# Patient Record
Sex: FEMALE | Race: WHITE | Employment: FULL TIME | URBAN - METROPOLITAN AREA
[De-identification: names, ages, dates, MRNs, and addresses within clinical notes are randomized per-mention and may not be internally consistent; named-entity substitution may affect disease eponyms.]

---

## 2018-01-26 ENCOUNTER — HOSPITAL ENCOUNTER (EMERGENCY)
Facility: HOSPITAL | Age: 48
Discharge: HOME | End: 2018-01-26
Payer: COMMERCIAL

## 2018-01-26 ENCOUNTER — APPOINTMENT (OUTPATIENT)
Dept: GENERAL RADIOLOGY | Facility: HOSPITAL | Age: 48
End: 2018-01-26
Payer: COMMERCIAL

## 2018-01-26 VITALS
SYSTOLIC BLOOD PRESSURE: 130 MMHG | HEIGHT: 65 IN | HEART RATE: 67 BPM | RESPIRATION RATE: 18 BRPM | DIASTOLIC BLOOD PRESSURE: 85 MMHG | BODY MASS INDEX: 34.99 KG/M2 | WEIGHT: 210 LBS | OXYGEN SATURATION: 97 % | TEMPERATURE: 98 F

## 2018-01-26 DIAGNOSIS — J06.9 UPPER RESPIRATORY TRACT INFECTION, UNSPECIFIED TYPE: Primary | ICD-10-CM

## 2018-01-26 DIAGNOSIS — R07.89 CHEST PAIN, ATYPICAL: ICD-10-CM

## 2018-01-26 LAB
ANION GAP SERPL CALC-SCNC: 10 MMOL/L (ref 0–18)
BASOPHILS # BLD: 0.1 K/UL (ref 0–0.2)
BASOPHILS NFR BLD: 1 %
BUN SERPL-MCNC: 11 MG/DL (ref 8–20)
BUN/CREAT SERPL: 13.4 (ref 10–20)
CALCIUM SERPL-MCNC: 9.1 MG/DL (ref 8.5–10.5)
CHLORIDE SERPL-SCNC: 102 MMOL/L (ref 95–110)
CO2 SERPL-SCNC: 23 MMOL/L (ref 22–32)
CREAT SERPL-MCNC: 0.82 MG/DL (ref 0.5–1.5)
EOSINOPHIL # BLD: 0.2 K/UL (ref 0–0.7)
EOSINOPHIL NFR BLD: 2 %
ERYTHROCYTE [DISTWIDTH] IN BLOOD BY AUTOMATED COUNT: 13 % (ref 11–15)
FLUAV + FLUBV RNA SPEC NAA+PROBE: NEGATIVE
GLUCOSE SERPL-MCNC: 102 MG/DL (ref 70–99)
HCT VFR BLD AUTO: 41.3 % (ref 35–48)
HGB BLD-MCNC: 13.8 G/DL (ref 12–16)
LYMPHOCYTES # BLD: 3.1 K/UL (ref 1–4)
LYMPHOCYTES NFR BLD: 33 %
MCH RBC QN AUTO: 28.7 PG (ref 27–32)
MCHC RBC AUTO-ENTMCNC: 33.3 G/DL (ref 32–37)
MCV RBC AUTO: 86.1 FL (ref 80–100)
MONOCYTES # BLD: 1.5 K/UL (ref 0–1)
MONOCYTES NFR BLD: 16 %
NEUTROPHILS # BLD AUTO: 4.5 K/UL (ref 1.8–7.7)
NEUTROPHILS NFR BLD: 48 %
OSMOLALITY UR CALC.SUM OF ELEC: 280 MOSM/KG (ref 275–295)
PLATELET # BLD AUTO: 297 K/UL (ref 140–400)
PMV BLD AUTO: 8 FL (ref 7.4–10.3)
POTASSIUM SERPL-SCNC: 3.8 MMOL/L (ref 3.3–5.1)
RBC # BLD AUTO: 4.79 M/UL (ref 3.7–5.4)
SODIUM SERPL-SCNC: 135 MMOL/L (ref 136–144)
TROPONIN I SERPL-MCNC: 0 NG/ML (ref ?–0.03)
WBC # BLD AUTO: 9.4 K/UL (ref 4–11)

## 2018-01-26 PROCEDURE — 85025 COMPLETE CBC W/AUTO DIFF WBC: CPT

## 2018-01-26 PROCEDURE — 99285 EMERGENCY DEPT VISIT HI MDM: CPT

## 2018-01-26 PROCEDURE — 71046 X-RAY EXAM CHEST 2 VIEWS: CPT

## 2018-01-26 PROCEDURE — 84484 ASSAY OF TROPONIN QUANT: CPT

## 2018-01-26 PROCEDURE — 87631 RESP VIRUS 3-5 TARGETS: CPT

## 2018-01-26 PROCEDURE — 93010 ELECTROCARDIOGRAM REPORT: CPT | Performed by: INTERNAL MEDICINE

## 2018-01-26 PROCEDURE — 80048 BASIC METABOLIC PNL TOTAL CA: CPT

## 2018-01-26 PROCEDURE — 36415 COLL VENOUS BLD VENIPUNCTURE: CPT

## 2018-01-26 PROCEDURE — 93005 ELECTROCARDIOGRAM TRACING: CPT

## 2018-01-26 PROCEDURE — 94640 AIRWAY INHALATION TREATMENT: CPT

## 2018-01-26 RX ORDER — ALBUTEROL SULFATE 90 UG/1
2 AEROSOL, METERED RESPIRATORY (INHALATION) EVERY 4 HOURS PRN
Qty: 1 INHALER | Refills: 0 | Status: SHIPPED | OUTPATIENT
Start: 2018-01-26 | End: 2018-02-25

## 2018-01-26 RX ORDER — AMOXICILLIN 500 MG/1
500 TABLET, FILM COATED ORAL 2 TIMES DAILY
COMMUNITY

## 2018-01-26 RX ORDER — IPRATROPIUM BROMIDE AND ALBUTEROL SULFATE 2.5; .5 MG/3ML; MG/3ML
3 SOLUTION RESPIRATORY (INHALATION) ONCE
Status: COMPLETED | OUTPATIENT
Start: 2018-01-26 | End: 2018-01-26

## 2018-01-27 NOTE — ED NOTES
Received pt from triage. Pt here with c/o SOB since yesterday and chills x1 week. Pt denies any fevers or pain. Lung sounds clear. Awaiting duoneb per resp.

## 2018-01-27 NOTE — ED PROVIDER NOTES
Patient Seen in: [facility] AND [facility] Emergency Department    History   Patient presents with:  Dyspnea TERRA SOB (respiratory)    Stated Complaint: Chest pressure and SOB    HPI    Patient presents with complaint of for the past week has not been feeling wel noted in HPI. Constitutional and vital signs reviewed. All other systems reviewed and negative except as noted above. PSFH elements reviewed from today and agreed except as otherwise stated in HPI.     Physical Exam   ED Triage Vitals  BP: 107/73 [ METABOLIC PANEL (8) - Abnormal; Notable for the following:        Result Value    Glucose 102 (*)     Sodium 135 (*)     All other components within normal limits   CBC W/ DIFFERENTIAL - Abnormal; Notable for the following:     Monocyte Absolute 1.5 (*) for Wheezing., Normal, Disp-1 Inhaler, R-0

## 2020-07-30 ENCOUNTER — HOSPITAL ENCOUNTER (OUTPATIENT)
Dept: MAMMOGRAPHY | Facility: HOSPITAL | Age: 50
Discharge: HOME | End: 2020-07-30
Attending: FAMILY MEDICINE
Payer: COMMERCIAL

## 2020-07-30 DIAGNOSIS — Z12.31 ENCOUNTER FOR SCREENING MAMMOGRAM FOR MALIGNANT NEOPLASM OF BREAST: ICD-10-CM

## 2020-08-22 ENCOUNTER — HOSPITAL ENCOUNTER (OUTPATIENT)
Dept: MAMMOGRAPHY | Facility: HOSPITAL | Age: 50
Discharge: HOME | End: 2020-08-22
Attending: FAMILY MEDICINE
Payer: COMMERCIAL

## 2020-08-22 PROCEDURE — 77063 BREAST TOMOSYNTHESIS BI: CPT | Performed by: FAMILY MEDICINE

## 2020-08-22 PROCEDURE — 77067 SCR MAMMO BI INCL CAD: CPT | Performed by: FAMILY MEDICINE

## 2022-05-07 ENCOUNTER — HOSPITAL ENCOUNTER (EMERGENCY)
Facility: HOSPITAL | Age: 52
Discharge: HOME | End: 2022-05-07
Attending: EMERGENCY MEDICINE
Payer: COMMERCIAL

## 2022-05-07 ENCOUNTER — APPOINTMENT (OUTPATIENT)
Dept: GENERAL RADIOLOGY | Facility: HOSPITAL | Age: 52
End: 2022-05-07
Attending: EMERGENCY MEDICINE
Payer: COMMERCIAL

## 2022-05-07 VITALS
HEART RATE: 93 BPM | TEMPERATURE: 97 F | SYSTOLIC BLOOD PRESSURE: 122 MMHG | WEIGHT: 175 LBS | DIASTOLIC BLOOD PRESSURE: 80 MMHG | OXYGEN SATURATION: 99 % | BODY MASS INDEX: 29.16 KG/M2 | RESPIRATION RATE: 18 BRPM | HEIGHT: 65 IN

## 2022-05-07 DIAGNOSIS — J02.0 STREP PHARYNGITIS: Primary | ICD-10-CM

## 2022-05-07 LAB
S PYO AG THROAT QL: POSITIVE
SARS-COV-2 RNA RESP QL NAA+PROBE: NOT DETECTED

## 2022-05-07 PROCEDURE — 99284 EMERGENCY DEPT VISIT MOD MDM: CPT

## 2022-05-07 PROCEDURE — 71045 X-RAY EXAM CHEST 1 VIEW: CPT | Performed by: EMERGENCY MEDICINE

## 2022-05-07 PROCEDURE — 87880 STREP A ASSAY W/OPTIC: CPT

## 2022-05-07 RX ORDER — PENICILLIN V POTASSIUM 500 MG/1
500 TABLET ORAL 3 TIMES DAILY
Qty: 30 TABLET | Refills: 0 | Status: SHIPPED | OUTPATIENT
Start: 2022-05-07 | End: 2022-05-17

## 2022-05-07 RX ORDER — IBUPROFEN 600 MG/1
600 TABLET ORAL ONCE
Status: COMPLETED | OUTPATIENT
Start: 2022-05-07 | End: 2022-05-07

## 2022-05-07 NOTE — ED INITIAL ASSESSMENT (HPI)
Pt to the ed for sore throat, cough, body aches, and fatigue since yesterday.   Also reports dizziness when standing

## 2022-05-31 ENCOUNTER — HOSPITAL ENCOUNTER (OUTPATIENT)
Dept: ULTRASOUND IMAGING | Age: 52
Discharge: HOME | End: 2022-05-31
Attending: FAMILY MEDICINE
Payer: COMMERCIAL

## 2022-05-31 ENCOUNTER — HOSPITAL ENCOUNTER (OUTPATIENT)
Dept: MAMMOGRAPHY | Facility: HOSPITAL | Age: 52
Discharge: HOME | End: 2022-05-31
Attending: FAMILY MEDICINE
Payer: COMMERCIAL

## 2022-05-31 DIAGNOSIS — Z12.31 ENCOUNTER FOR SCREENING MAMMOGRAM FOR MALIGNANT NEOPLASM OF BREAST: ICD-10-CM

## 2022-05-31 DIAGNOSIS — R10.2 PELVIC PAIN: ICD-10-CM

## 2022-05-31 DIAGNOSIS — R31.9 HEMATURIA: ICD-10-CM

## 2022-05-31 PROCEDURE — 76830 TRANSVAGINAL US NON-OB: CPT | Performed by: FAMILY MEDICINE

## 2022-05-31 PROCEDURE — 76856 US EXAM PELVIC COMPLETE: CPT | Performed by: FAMILY MEDICINE

## 2022-05-31 PROCEDURE — 76775 US EXAM ABDO BACK WALL LIM: CPT | Performed by: FAMILY MEDICINE

## 2022-06-13 PROBLEM — M76.822 POSTERIOR TIBIAL TENDON DYSFUNCTION, LEFT: Status: ACTIVE | Noted: 2022-06-13

## 2022-06-13 PROBLEM — M79.671 HEEL PAIN, BILATERAL: Status: ACTIVE | Noted: 2022-06-13

## 2022-06-13 PROBLEM — M79.672 HEEL PAIN, BILATERAL: Status: ACTIVE | Noted: 2022-06-13

## 2022-06-15 ENCOUNTER — HOSPITAL ENCOUNTER (OUTPATIENT)
Dept: ULTRASOUND IMAGING | Facility: HOSPITAL | Age: 52
Discharge: HOME | End: 2022-06-15
Attending: FAMILY MEDICINE
Payer: COMMERCIAL

## 2022-06-15 ENCOUNTER — HOSPITAL ENCOUNTER (OUTPATIENT)
Dept: MAMMOGRAPHY | Facility: HOSPITAL | Age: 52
Discharge: HOME | End: 2022-06-15
Attending: FAMILY MEDICINE
Payer: COMMERCIAL

## 2022-06-15 DIAGNOSIS — N64.4 MASTODYNIA: ICD-10-CM

## 2022-06-15 PROCEDURE — 76642 ULTRASOUND BREAST LIMITED: CPT | Performed by: FAMILY MEDICINE

## 2022-06-15 PROCEDURE — 77066 DX MAMMO INCL CAD BI: CPT | Performed by: FAMILY MEDICINE

## 2022-06-15 PROCEDURE — 77062 BREAST TOMOSYNTHESIS BI: CPT | Performed by: FAMILY MEDICINE

## 2023-02-23 ENCOUNTER — HOSPITAL ENCOUNTER (EMERGENCY)
Facility: HOSPITAL | Age: 53
Discharge: HOME | End: 2023-02-23
Attending: EMERGENCY MEDICINE

## 2023-02-23 ENCOUNTER — APPOINTMENT (OUTPATIENT)
Dept: CT IMAGING | Facility: HOSPITAL | Age: 53
End: 2023-02-23
Attending: EMERGENCY MEDICINE

## 2023-02-23 VITALS
BODY MASS INDEX: 32 KG/M2 | HEART RATE: 83 BPM | TEMPERATURE: 98 F | SYSTOLIC BLOOD PRESSURE: 108 MMHG | OXYGEN SATURATION: 94 % | RESPIRATION RATE: 16 BRPM | HEIGHT: 65 IN | DIASTOLIC BLOOD PRESSURE: 58 MMHG

## 2023-02-23 DIAGNOSIS — J02.0 STREP PHARYNGITIS: Primary | ICD-10-CM

## 2023-02-23 DIAGNOSIS — R10.9 ABDOMINAL PAIN, RIGHT LATERAL: ICD-10-CM

## 2023-02-23 LAB
ALBUMIN SERPL-MCNC: 3.5 G/DL (ref 3.4–5)
ALBUMIN/GLOB SERPL: 0.8 {RATIO} (ref 1–2)
ALP LIVER SERPL-CCNC: 69 U/L
ALT SERPL-CCNC: 62 U/L
ANION GAP SERPL CALC-SCNC: 6 MMOL/L (ref 0–18)
AST SERPL-CCNC: 38 U/L (ref 15–37)
BASOPHILS # BLD AUTO: 0.07 X10(3) UL (ref 0–0.2)
BASOPHILS NFR BLD AUTO: 0.5 %
BILIRUB SERPL-MCNC: 0.6 MG/DL (ref 0.1–2)
BILIRUB UR QL: NEGATIVE
BUN BLD-MCNC: 8 MG/DL (ref 7–18)
BUN/CREAT SERPL: 9.5 (ref 10–20)
CALCIUM BLD-MCNC: 9.1 MG/DL (ref 8.5–10.1)
CHLORIDE SERPL-SCNC: 107 MMOL/L (ref 98–112)
CLARITY UR: CLEAR
CO2 SERPL-SCNC: 23 MMOL/L (ref 21–32)
CREAT BLD-MCNC: 0.84 MG/DL
DEPRECATED RDW RBC AUTO: 39.8 FL (ref 35.1–46.3)
EOSINOPHIL # BLD AUTO: 0.06 X10(3) UL (ref 0–0.7)
EOSINOPHIL NFR BLD AUTO: 0.4 %
ERYTHROCYTE [DISTWIDTH] IN BLOOD BY AUTOMATED COUNT: 12.8 % (ref 11–15)
GFR SERPLBLD BASED ON 1.73 SQ M-ARVRAT: 84 ML/MIN/1.73M2 (ref 60–?)
GLOBULIN PLAS-MCNC: 4.5 G/DL (ref 2.8–4.4)
GLUCOSE BLD-MCNC: 133 MG/DL (ref 70–99)
GLUCOSE UR-MCNC: NORMAL MG/DL
HCT VFR BLD AUTO: 42.6 %
HGB BLD-MCNC: 14.1 G/DL
IMM GRANULOCYTES # BLD AUTO: 0.08 X10(3) UL (ref 0–1)
IMM GRANULOCYTES NFR BLD: 0.5 %
KETONES UR-MCNC: NEGATIVE MG/DL
LEUKOCYTE ESTERASE UR QL STRIP.AUTO: NEGATIVE
LIPASE SERPL-CCNC: 116 U/L (ref 73–393)
LIPASE SERPL-CCNC: 28 U/L (ref 13–75)
LYMPHOCYTES # BLD AUTO: 1.78 X10(3) UL (ref 1–4)
LYMPHOCYTES NFR BLD AUTO: 11.7 %
MCH RBC QN AUTO: 28.4 PG (ref 26–34)
MCHC RBC AUTO-ENTMCNC: 33.1 G/DL (ref 31–37)
MCV RBC AUTO: 85.7 FL
MONOCYTES # BLD AUTO: 1.47 X10(3) UL (ref 0.1–1)
MONOCYTES NFR BLD AUTO: 9.6 %
NEUTROPHILS # BLD AUTO: 11.81 X10 (3) UL (ref 1.5–7.7)
NEUTROPHILS # BLD AUTO: 11.81 X10(3) UL (ref 1.5–7.7)
NEUTROPHILS NFR BLD AUTO: 77.3 %
NITRITE UR QL STRIP.AUTO: NEGATIVE
OSMOLALITY SERPL CALC.SUM OF ELEC: 282 MOSM/KG (ref 275–295)
PH UR: 7.5 [PH] (ref 5–8)
PLATELET # BLD AUTO: 315 10(3)UL (ref 150–450)
POTASSIUM SERPL-SCNC: 3.8 MMOL/L (ref 3.5–5.1)
PROT SERPL-MCNC: 8 G/DL (ref 6.4–8.2)
RBC # BLD AUTO: 4.97 X10(6)UL
RBC #/AREA URNS AUTO: >10 /HPF
S PYO AG THROAT QL: POSITIVE
SODIUM SERPL-SCNC: 136 MMOL/L (ref 136–145)
SP GR UR STRIP: 1.02 (ref 1–1.03)
UROBILINOGEN UR STRIP-ACNC: NORMAL
WBC # BLD AUTO: 15.3 X10(3) UL (ref 4–11)

## 2023-02-23 PROCEDURE — 96361 HYDRATE IV INFUSION ADD-ON: CPT

## 2023-02-23 PROCEDURE — 96374 THER/PROPH/DIAG INJ IV PUSH: CPT

## 2023-02-23 PROCEDURE — 74176 CT ABD & PELVIS W/O CONTRAST: CPT | Performed by: EMERGENCY MEDICINE

## 2023-02-23 PROCEDURE — 81001 URINALYSIS AUTO W/SCOPE: CPT | Performed by: EMERGENCY MEDICINE

## 2023-02-23 PROCEDURE — 80053 COMPREHEN METABOLIC PANEL: CPT | Performed by: EMERGENCY MEDICINE

## 2023-02-23 PROCEDURE — 87880 STREP A ASSAY W/OPTIC: CPT

## 2023-02-23 PROCEDURE — 96375 TX/PRO/DX INJ NEW DRUG ADDON: CPT

## 2023-02-23 PROCEDURE — 99285 EMERGENCY DEPT VISIT HI MDM: CPT

## 2023-02-23 PROCEDURE — 85025 COMPLETE CBC W/AUTO DIFF WBC: CPT | Performed by: EMERGENCY MEDICINE

## 2023-02-23 PROCEDURE — 99284 EMERGENCY DEPT VISIT MOD MDM: CPT

## 2023-02-23 PROCEDURE — 83690 ASSAY OF LIPASE: CPT | Performed by: EMERGENCY MEDICINE

## 2023-02-23 RX ORDER — AMOXICILLIN 500 MG/1
1000 TABLET, FILM COATED ORAL DAILY
Qty: 20 TABLET | Refills: 0 | Status: SHIPPED | OUTPATIENT
Start: 2023-02-23 | End: 2023-03-05

## 2023-02-23 RX ORDER — ONDANSETRON 2 MG/ML
4 INJECTION INTRAMUSCULAR; INTRAVENOUS ONCE
Status: COMPLETED | OUTPATIENT
Start: 2023-02-23 | End: 2023-02-23

## 2023-02-23 RX ORDER — KETOROLAC TROMETHAMINE 15 MG/ML
15 INJECTION, SOLUTION INTRAMUSCULAR; INTRAVENOUS ONCE
Status: COMPLETED | OUTPATIENT
Start: 2023-02-23 | End: 2023-02-23

## 2023-02-23 RX ORDER — IBUPROFEN 600 MG/1
600 TABLET ORAL EVERY 8 HOURS PRN
Qty: 30 TABLET | Refills: 0 | Status: SHIPPED | OUTPATIENT
Start: 2023-02-23 | End: 2023-03-02

## 2023-02-23 NOTE — ED INITIAL ASSESSMENT (HPI)
Patient reports nausea/dizziness, right sided abdominal pain/RUQ pain and sore throat since last night. Patient reports feeling like she felt like she had a fever overnight.  Gallbladder removed 33 yrs ago

## 2023-07-21 ENCOUNTER — OFFICE VISIT (OUTPATIENT)
Dept: FAMILY MEDICINE CLINIC | Facility: CLINIC | Age: 53
End: 2023-07-21

## 2023-07-21 VITALS
HEART RATE: 73 BPM | HEIGHT: 65 IN | SYSTOLIC BLOOD PRESSURE: 124 MMHG | BODY MASS INDEX: 32.15 KG/M2 | DIASTOLIC BLOOD PRESSURE: 83 MMHG | WEIGHT: 193 LBS

## 2023-07-21 DIAGNOSIS — Z12.11 SCREENING FOR MALIGNANT NEOPLASM OF COLON: ICD-10-CM

## 2023-07-21 DIAGNOSIS — R35.0 URINARY FREQUENCY: ICD-10-CM

## 2023-07-21 DIAGNOSIS — R10.9 FLANK PAIN: ICD-10-CM

## 2023-07-21 DIAGNOSIS — N28.89 RIGHT KIDNEY MASS: Primary | ICD-10-CM

## 2023-07-21 LAB
APPEARANCE: CLEAR
BILIRUBIN: NEGATIVE
GLUCOSE (URINE DIPSTICK): NEGATIVE MG/DL
KETONES (URINE DIPSTICK): NEGATIVE MG/DL
LEUKOCYTES: NEGATIVE
MULTISTIX LOT#: ABNORMAL NUMERIC
NITRITE, URINE: NEGATIVE
PH, URINE: 6 (ref 4.5–8)
PROTEIN (URINE DIPSTICK): NEGATIVE MG/DL
SPECIFIC GRAVITY: 1.02 (ref 1–1.03)
URINE-COLOR: YELLOW
UROBILINOGEN,SEMI-QN: 0.2 MG/DL (ref 0–1.9)

## 2023-07-21 PROCEDURE — 81003 URINALYSIS AUTO W/O SCOPE: CPT | Performed by: NURSE PRACTITIONER

## 2023-07-21 PROCEDURE — 3079F DIAST BP 80-89 MM HG: CPT | Performed by: NURSE PRACTITIONER

## 2023-07-21 PROCEDURE — 3074F SYST BP LT 130 MM HG: CPT | Performed by: NURSE PRACTITIONER

## 2023-07-21 PROCEDURE — 3008F BODY MASS INDEX DOCD: CPT | Performed by: NURSE PRACTITIONER

## 2023-07-21 PROCEDURE — 99203 OFFICE O/P NEW LOW 30 MIN: CPT | Performed by: NURSE PRACTITIONER

## 2023-08-02 ENCOUNTER — OFFICE VISIT (OUTPATIENT)
Dept: FAMILY MEDICINE CLINIC | Facility: CLINIC | Age: 53
End: 2023-08-02

## 2023-08-02 VITALS
HEIGHT: 65 IN | SYSTOLIC BLOOD PRESSURE: 112 MMHG | HEART RATE: 73 BPM | DIASTOLIC BLOOD PRESSURE: 76 MMHG | BODY MASS INDEX: 32.65 KG/M2 | WEIGHT: 196 LBS

## 2023-08-02 DIAGNOSIS — R73.03 PREDIABETES: ICD-10-CM

## 2023-08-02 DIAGNOSIS — Z12.31 ENCOUNTER FOR SCREENING MAMMOGRAM FOR MALIGNANT NEOPLASM OF BREAST: ICD-10-CM

## 2023-08-02 DIAGNOSIS — N28.89 RIGHT KIDNEY MASS: Primary | ICD-10-CM

## 2023-08-02 DIAGNOSIS — E55.9 VITAMIN D DEFICIENCY: ICD-10-CM

## 2023-08-02 PROCEDURE — 3078F DIAST BP <80 MM HG: CPT | Performed by: NURSE PRACTITIONER

## 2023-08-02 PROCEDURE — 99214 OFFICE O/P EST MOD 30 MIN: CPT | Performed by: NURSE PRACTITIONER

## 2023-08-02 PROCEDURE — 3074F SYST BP LT 130 MM HG: CPT | Performed by: NURSE PRACTITIONER

## 2023-08-02 PROCEDURE — 3008F BODY MASS INDEX DOCD: CPT | Performed by: NURSE PRACTITIONER

## 2023-08-02 RX ORDER — TOPIRAMATE 25 MG/1
TABLET ORAL
Qty: 77 TABLET | Refills: 0 | Status: SHIPPED | OUTPATIENT
Start: 2023-08-02

## 2023-08-12 ENCOUNTER — APPOINTMENT (OUTPATIENT)
Dept: GENERAL RADIOLOGY | Age: 53
End: 2023-08-12
Attending: EMERGENCY MEDICINE

## 2023-08-12 ENCOUNTER — APPOINTMENT (OUTPATIENT)
Dept: CT IMAGING | Age: 53
End: 2023-08-12
Attending: EMERGENCY MEDICINE

## 2023-08-12 ENCOUNTER — HOSPITAL ENCOUNTER (OUTPATIENT)
Age: 53
Setting detail: OBSERVATION
Discharge: HOME | End: 2023-08-13
Attending: EMERGENCY MEDICINE | Admitting: INTERNAL MEDICINE

## 2023-08-12 DIAGNOSIS — G45.9 TIA (TRANSIENT ISCHEMIC ATTACK): Primary | ICD-10-CM

## 2023-08-12 LAB
ALBUMIN SERPL-MCNC: 3.6 G/DL (ref 3.6–5.1)
ALBUMIN/GLOB SERPL: 0.8 {RATIO} (ref 1–2.4)
ALP SERPL-CCNC: 74 UNITS/L (ref 45–117)
ALT SERPL-CCNC: 58 UNITS/L
ANION GAP SERPL CALC-SCNC: 11 MMOL/L (ref 7–19)
AST SERPL-CCNC: 36 UNITS/L
BASOPHILS # BLD: 0.1 K/MCL (ref 0–0.3)
BASOPHILS NFR BLD: 1 %
BILIRUB SERPL-MCNC: 0.4 MG/DL (ref 0.2–1)
BUN SERPL-MCNC: 14 MG/DL (ref 6–20)
BUN/CREAT SERPL: 17 (ref 7–25)
CALCIUM SERPL-MCNC: 8.9 MG/DL (ref 8.4–10.2)
CHLORIDE SERPL-SCNC: 109 MMOL/L (ref 97–110)
CO2 SERPL-SCNC: 24 MMOL/L (ref 21–32)
CREAT SERPL-MCNC: 0.82 MG/DL (ref 0.51–0.95)
DEPRECATED RDW RBC: 40.7 FL (ref 39–50)
EOSINOPHIL # BLD: 0.2 K/MCL (ref 0–0.5)
EOSINOPHIL NFR BLD: 2 %
ERYTHROCYTE [DISTWIDTH] IN BLOOD: 12.9 % (ref 11–15)
FASTING DURATION TIME PATIENT: ABNORMAL H
GFR SERPLBLD BASED ON 1.73 SQ M-ARVRAT: 86 ML/MIN
GLOBULIN SER-MCNC: 4.6 G/DL (ref 2–4)
GLUCOSE SERPL-MCNC: 130 MG/DL (ref 70–99)
HCT VFR BLD CALC: 43.5 % (ref 36–46.5)
HGB BLD-MCNC: 14.2 G/DL (ref 12–15.5)
IMM GRANULOCYTES # BLD AUTO: 0 K/MCL (ref 0–0.2)
IMM GRANULOCYTES # BLD: 0 %
LYMPHOCYTES # BLD: 3.6 K/MCL (ref 1–4)
LYMPHOCYTES NFR BLD: 35 %
MAGNESIUM SERPL-MCNC: 2.2 MG/DL (ref 1.7–2.4)
MCH RBC QN AUTO: 28.4 PG (ref 26–34)
MCHC RBC AUTO-ENTMCNC: 32.6 G/DL (ref 32–36.5)
MCV RBC AUTO: 87 FL (ref 78–100)
MONOCYTES # BLD: 1.5 K/MCL (ref 0.3–0.9)
MONOCYTES NFR BLD: 15 %
NEUTROPHILS # BLD: 4.8 K/MCL (ref 1.8–7.7)
NEUTROPHILS NFR BLD: 47 %
NRBC BLD MANUAL-RTO: 0 /100 WBC
PLATELET # BLD AUTO: 335 K/MCL (ref 140–450)
POTASSIUM SERPL-SCNC: 3.8 MMOL/L (ref 3.4–5.1)
PROT SERPL-MCNC: 8.2 G/DL (ref 6.4–8.2)
RBC # BLD: 5 MIL/MCL (ref 4–5.2)
SODIUM SERPL-SCNC: 140 MMOL/L (ref 135–145)
TROPONIN I SERPL DL<=0.01 NG/ML-MCNC: <4 NG/L
WBC # BLD: 10.1 K/MCL (ref 4.2–11)

## 2023-08-12 PROCEDURE — 80053 COMPREHEN METABOLIC PANEL: CPT | Performed by: EMERGENCY MEDICINE

## 2023-08-12 PROCEDURE — 71045 X-RAY EXAM CHEST 1 VIEW: CPT

## 2023-08-12 PROCEDURE — 84484 ASSAY OF TROPONIN QUANT: CPT | Performed by: EMERGENCY MEDICINE

## 2023-08-12 PROCEDURE — 83735 ASSAY OF MAGNESIUM: CPT | Performed by: EMERGENCY MEDICINE

## 2023-08-12 PROCEDURE — 70450 CT HEAD/BRAIN W/O DYE: CPT

## 2023-08-12 PROCEDURE — 85025 COMPLETE CBC W/AUTO DIFF WBC: CPT | Performed by: EMERGENCY MEDICINE

## 2023-08-12 PROCEDURE — 36415 COLL VENOUS BLD VENIPUNCTURE: CPT

## 2023-08-12 PROCEDURE — 99285 EMERGENCY DEPT VISIT HI MDM: CPT

## 2023-08-12 PROCEDURE — G0378 HOSPITAL OBSERVATION PER HR: HCPCS

## 2023-08-12 PROCEDURE — 93005 ELECTROCARDIOGRAM TRACING: CPT | Performed by: EMERGENCY MEDICINE

## 2023-08-12 PROCEDURE — 10004651 HB RX, NO CHARGE ITEM: Performed by: INTERNAL MEDICINE

## 2023-08-12 PROCEDURE — 10002803 HB RX 637: Performed by: INTERNAL MEDICINE

## 2023-08-12 PROCEDURE — 10004651 HB RX, NO CHARGE ITEM: Performed by: EMERGENCY MEDICINE

## 2023-08-12 PROCEDURE — G1004 CDSM NDSC: HCPCS

## 2023-08-12 RX ORDER — ACETAMINOPHEN 325 MG/1
650 TABLET ORAL EVERY 4 HOURS PRN
Status: DISCONTINUED | OUTPATIENT
Start: 2023-08-12 | End: 2023-08-13 | Stop reason: HOSPADM

## 2023-08-12 RX ORDER — 0.9 % SODIUM CHLORIDE 0.9 %
2 VIAL (ML) INJECTION EVERY 12 HOURS SCHEDULED
Status: DISCONTINUED | OUTPATIENT
Start: 2023-08-12 | End: 2023-08-13 | Stop reason: HOSPADM

## 2023-08-12 RX ORDER — TOPIRAMATE 25 MG/1
25 TABLET ORAL 2 TIMES DAILY
COMMUNITY
Start: 2023-08-02

## 2023-08-12 RX ORDER — TOPIRAMATE 25 MG/1
25 TABLET ORAL 2 TIMES DAILY
Status: DISCONTINUED | OUTPATIENT
Start: 2023-08-12 | End: 2023-08-13 | Stop reason: HOSPADM

## 2023-08-12 RX ORDER — ENOXAPARIN SODIUM 100 MG/ML
40 INJECTION SUBCUTANEOUS DAILY
Status: DISCONTINUED | OUTPATIENT
Start: 2023-08-13 | End: 2023-08-13 | Stop reason: HOSPADM

## 2023-08-12 RX ORDER — ASPIRIN 81 MG/1
324 TABLET, CHEWABLE ORAL ONCE
Status: COMPLETED | OUTPATIENT
Start: 2023-08-12 | End: 2023-08-12

## 2023-08-12 RX ORDER — POLYETHYLENE GLYCOL 3350 17 G/17G
17 POWDER, FOR SOLUTION ORAL DAILY PRN
Status: DISCONTINUED | OUTPATIENT
Start: 2023-08-12 | End: 2023-08-13 | Stop reason: HOSPADM

## 2023-08-12 RX ORDER — ONDANSETRON 2 MG/ML
4 INJECTION INTRAMUSCULAR; INTRAVENOUS 2 TIMES DAILY PRN
Status: DISCONTINUED | OUTPATIENT
Start: 2023-08-12 | End: 2023-08-13 | Stop reason: HOSPADM

## 2023-08-12 RX ORDER — MAGNESIUM HYDROXIDE/ALUMINUM HYDROXICE/SIMETHICONE 120; 1200; 1200 MG/30ML; MG/30ML; MG/30ML
30 SUSPENSION ORAL EVERY 4 HOURS PRN
Status: DISCONTINUED | OUTPATIENT
Start: 2023-08-12 | End: 2023-08-13 | Stop reason: HOSPADM

## 2023-08-12 RX ADMIN — SODIUM CHLORIDE, PRESERVATIVE FREE 2 ML: 5 INJECTION INTRAVENOUS at 22:22

## 2023-08-12 RX ADMIN — TOPIRAMATE 25 MG: 25 TABLET, FILM COATED ORAL at 22:22

## 2023-08-12 RX ADMIN — ASPIRIN 81 MG 324 MG: 81 TABLET ORAL at 21:11

## 2023-08-12 SDOH — SOCIAL STABILITY: SOCIAL NETWORK: SUPPORT SYSTEMS: SPOUSE

## 2023-08-12 SDOH — ECONOMIC STABILITY: GENERAL

## 2023-08-12 SDOH — ECONOMIC STABILITY: TRANSPORTATION INSECURITY
IN THE PAST 12 MONTHS, HAS THE LACK OF TRANSPORTATION KEPT YOU FROM MEDICAL APPOINTMENTS OR FROM GETTING MEDICATIONS?: NO

## 2023-08-12 SDOH — ECONOMIC STABILITY: HOUSING INSECURITY: WHAT IS YOUR LIVING SITUATION TODAY?: HOUSE

## 2023-08-12 SDOH — ECONOMIC STABILITY: TRANSPORTATION INSECURITY
IN THE PAST 12 MONTHS, HAS LACK OF TRANSPORTATION KEPT YOU FROM MEETINGS, WORK, OR FROM GETTING THINGS NEEDED FOR DAILY LIVING?: NO

## 2023-08-12 SDOH — ECONOMIC STABILITY: FOOD INSECURITY: HOW OFTEN IN THE PAST 12 MONTHS WERE YOU WORRIED OR STRESSED ABOUT HAVING ENOUGH MONEY TO BUY NUTRITIOUS MEALS?: NEVER

## 2023-08-12 SDOH — ECONOMIC STABILITY: HOUSING INSECURITY: ARE YOU WORRIED ABOUT LOSING YOUR HOUSING?: NO

## 2023-08-12 SDOH — ECONOMIC STABILITY: HOUSING INSECURITY: WHAT IS YOUR LIVING SITUATION TODAY?: SPOUSE

## 2023-08-12 SDOH — SOCIAL STABILITY: SOCIAL NETWORK
HOW OFTEN DO YOU SEE OR TALK TO PEOPLE THAT YOU CARE ABOUT AND FEEL CLOSE TO? (FOR EXAMPLE: TALKING TO FRIENDS ON THE PHONE, VISITING FRIENDS OR FAMILY, GOING TO CHURCH OR CLUB MEETINGS): 5 OR MORE TIMES A WEEK

## 2023-08-12 SDOH — HEALTH STABILITY: PHYSICAL HEALTH: DO YOU HAVE SERIOUS DIFFICULTY WALKING OR CLIMBING STAIRS?: NO

## 2023-08-12 SDOH — HEALTH STABILITY: GENERAL
BECAUSE OF A PHYSICAL, MENTAL, OR EMOTIONAL CONDITION, DO YOU HAVE SERIOUS DIFFICULTY CONCENTRATING, REMEMBERING OR MAKING DECISIONS?: NO

## 2023-08-12 SDOH — HEALTH STABILITY: GENERAL: BECAUSE OF A PHYSICAL, MENTAL, OR EMOTIONAL CONDITION, DO YOU HAVE DIFFICULTY DOING ERRANDS ALONE?: NO

## 2023-08-12 SDOH — HEALTH STABILITY: PHYSICAL HEALTH: DO YOU HAVE DIFFICULTY DRESSING OR BATHING?: NO

## 2023-08-12 ASSESSMENT — ACTIVITIES OF DAILY LIVING (ADL)
ADL_BEFORE_ADMISSION: INDEPENDENT
RECENT_DECLINE_ADL: NO
FEEDING: INDEPENDENT
ADL_SHORT_OF_BREATH: NO
ADL_SCORE: 24
DRESSING: INDEPENDENT
TOILETING: INDEPENDENT
BATHING: INDEPENDENT

## 2023-08-12 ASSESSMENT — PATIENT HEALTH QUESTIONNAIRE - PHQ9
2. FEELING DOWN, DEPRESSED OR HOPELESS: NOT AT ALL
SUM OF ALL RESPONSES TO PHQ9 QUESTIONS 1 AND 2: 0
SUM OF ALL RESPONSES TO PHQ9 QUESTIONS 1 AND 2: 0
1. LITTLE INTEREST OR PLEASURE IN DOING THINGS: NOT AT ALL
CLINICAL INTERPRETATION OF PHQ2 SCORE: NO FURTHER SCREENING NEEDED
IS PATIENT ABLE TO COMPLETE PHQ2 OR PHQ9: YES

## 2023-08-12 ASSESSMENT — PAIN SCALES - GENERAL
PAINLEVEL_OUTOF10: 0

## 2023-08-12 ASSESSMENT — COLUMBIA-SUICIDE SEVERITY RATING SCALE - C-SSRS
6. HAVE YOU EVER DONE ANYTHING, STARTED TO DO ANYTHING, OR PREPARED TO DO ANYTHING TO END YOUR LIFE?: NO
2. HAVE YOU ACTUALLY HAD ANY THOUGHTS OF KILLING YOURSELF?: NO
1. WITHIN THE PAST MONTH, HAVE YOU WISHED YOU WERE DEAD OR WISHED YOU COULD GO TO SLEEP AND NOT WAKE UP?: NO
IS THE PATIENT ABLE TO COMPLETE C-SSRS: YES

## 2023-08-12 ASSESSMENT — LIFESTYLE VARIABLES
ALCOHOL_USE_STATUS: NO OR LOW RISK WITH VALIDATED TOOL
HOW MANY STANDARD DRINKS CONTAINING ALCOHOL DO YOU HAVE ON A TYPICAL DAY: 0,1 OR 2
AUDIT-C TOTAL SCORE: 0
HOW OFTEN DO YOU HAVE A DRINK CONTAINING ALCOHOL: NEVER
HOW OFTEN DO YOU HAVE 6 OR MORE DRINKS ON ONE OCCASION: NEVER

## 2023-08-12 ASSESSMENT — MOVEMENT AND STRENGTH ASSESSMENTS
FACE_JAW: FACE SYMMETRICAL;FULL RANGE OF MOTION;TONGUE MIDLINE
ALL_EXTREMITIES: EQUAL STRENGTH/TONE/MOVEMENT
HEAD_NECK: FULL RANGE OF MOTION

## 2023-08-13 ENCOUNTER — APPOINTMENT (OUTPATIENT)
Dept: MRI IMAGING | Age: 53
End: 2023-08-13
Attending: PSYCHIATRY & NEUROLOGY

## 2023-08-13 VITALS
WEIGHT: 194.67 LBS | HEIGHT: 65 IN | DIASTOLIC BLOOD PRESSURE: 65 MMHG | RESPIRATION RATE: 14 BRPM | HEART RATE: 71 BPM | TEMPERATURE: 97.9 F | SYSTOLIC BLOOD PRESSURE: 100 MMHG | OXYGEN SATURATION: 95 % | BODY MASS INDEX: 32.43 KG/M2

## 2023-08-13 LAB
ALBUMIN SERPL-MCNC: 3.2 G/DL (ref 3.6–5.1)
ALBUMIN/GLOB SERPL: 0.7 {RATIO} (ref 1–2.4)
ALP SERPL-CCNC: 56 UNITS/L (ref 45–117)
ALT SERPL-CCNC: 52 UNITS/L
ANION GAP SERPL CALC-SCNC: 10 MMOL/L (ref 7–19)
AST SERPL-CCNC: 31 UNITS/L
ATRIAL RATE (BPM): 75
BASOPHILS # BLD: 0.1 K/MCL (ref 0–0.3)
BASOPHILS NFR BLD: 1 %
BILIRUB SERPL-MCNC: 0.8 MG/DL (ref 0.2–1)
BUN SERPL-MCNC: 13 MG/DL (ref 6–20)
BUN/CREAT SERPL: 18 (ref 7–25)
CALCIUM SERPL-MCNC: 8.8 MG/DL (ref 8.4–10.2)
CHLORIDE SERPL-SCNC: 111 MMOL/L (ref 97–110)
CO2 SERPL-SCNC: 22 MMOL/L (ref 21–32)
CREAT SERPL-MCNC: 0.74 MG/DL (ref 0.51–0.95)
DEPRECATED RDW RBC: 40.5 FL (ref 39–50)
EOSINOPHIL # BLD: 0.2 K/MCL (ref 0–0.5)
EOSINOPHIL NFR BLD: 3 %
ERYTHROCYTE [DISTWIDTH] IN BLOOD: 12.9 % (ref 11–15)
FASTING DURATION TIME PATIENT: ABNORMAL H
GFR SERPLBLD BASED ON 1.73 SQ M-ARVRAT: >90 ML/MIN
GLOBULIN SER-MCNC: 4.4 G/DL (ref 2–4)
GLUCOSE SERPL-MCNC: 127 MG/DL (ref 70–99)
HCT VFR BLD CALC: 41.6 % (ref 36–46.5)
HGB BLD-MCNC: 13.7 G/DL (ref 12–15.5)
IMM GRANULOCYTES # BLD AUTO: 0 K/MCL (ref 0–0.2)
IMM GRANULOCYTES # BLD: 1 %
LYMPHOCYTES # BLD: 3.1 K/MCL (ref 1–4)
LYMPHOCYTES NFR BLD: 38 %
MCH RBC QN AUTO: 28.5 PG (ref 26–34)
MCHC RBC AUTO-ENTMCNC: 32.9 G/DL (ref 32–36.5)
MCV RBC AUTO: 86.7 FL (ref 78–100)
MONOCYTES # BLD: 1.1 K/MCL (ref 0.3–0.9)
MONOCYTES NFR BLD: 14 %
NEUTROPHILS # BLD: 3.6 K/MCL (ref 1.8–7.7)
NEUTROPHILS NFR BLD: 43 %
NRBC BLD MANUAL-RTO: 0 /100 WBC
P AXIS (DEGREES): 53
PLATELET # BLD AUTO: 296 K/MCL (ref 140–450)
POTASSIUM SERPL-SCNC: 3.8 MMOL/L (ref 3.4–5.1)
PR-INTERVAL (MSEC): 208
PROT SERPL-MCNC: 7.6 G/DL (ref 6.4–8.2)
QRS-INTERVAL (MSEC): 84
QT-INTERVAL (MSEC): 404
QTC: 451
R AXIS (DEGREES): 46
RAINBOW EXTRA TUBES HOLD SPECIMEN: NORMAL
RBC # BLD: 4.8 MIL/MCL (ref 4–5.2)
REPORT TEXT: NORMAL
SODIUM SERPL-SCNC: 139 MMOL/L (ref 135–145)
T AXIS (DEGREES): 53
VENTRICULAR RATE EKG/MIN (BPM): 75
WBC # BLD: 8.1 K/MCL (ref 4.2–11)

## 2023-08-13 PROCEDURE — G0378 HOSPITAL OBSERVATION PER HR: HCPCS

## 2023-08-13 PROCEDURE — 10004651 HB RX, NO CHARGE ITEM: Performed by: INTERNAL MEDICINE

## 2023-08-13 PROCEDURE — A9585 GADOBUTROL INJECTION: HCPCS | Performed by: PSYCHIATRY & NEUROLOGY

## 2023-08-13 PROCEDURE — 10002805 HB CONTRAST AGENT: Performed by: PSYCHIATRY & NEUROLOGY

## 2023-08-13 PROCEDURE — 10002803 HB RX 637: Performed by: INTERNAL MEDICINE

## 2023-08-13 PROCEDURE — 96372 THER/PROPH/DIAG INJ SC/IM: CPT | Performed by: INTERNAL MEDICINE

## 2023-08-13 PROCEDURE — 10002803 HB RX 637: Performed by: PSYCHIATRY & NEUROLOGY

## 2023-08-13 PROCEDURE — 99223 1ST HOSP IP/OBS HIGH 75: CPT | Performed by: INTERNAL MEDICINE

## 2023-08-13 PROCEDURE — 70553 MRI BRAIN STEM W/O & W/DYE: CPT

## 2023-08-13 PROCEDURE — 80053 COMPREHEN METABOLIC PANEL: CPT | Performed by: INTERNAL MEDICINE

## 2023-08-13 PROCEDURE — 10002800 HB RX 250 W HCPCS: Performed by: INTERNAL MEDICINE

## 2023-08-13 PROCEDURE — G1004 CDSM NDSC: HCPCS

## 2023-08-13 PROCEDURE — 85025 COMPLETE CBC W/AUTO DIFF WBC: CPT | Performed by: INTERNAL MEDICINE

## 2023-08-13 PROCEDURE — 36415 COLL VENOUS BLD VENIPUNCTURE: CPT | Performed by: INTERNAL MEDICINE

## 2023-08-13 RX ORDER — GADOBUTROL 604.72 MG/ML
10 INJECTION INTRAVENOUS ONCE
Status: COMPLETED | OUTPATIENT
Start: 2023-08-13 | End: 2023-08-13

## 2023-08-13 RX ORDER — ALPRAZOLAM 0.25 MG/1
0.5 TABLET ORAL
Status: COMPLETED | OUTPATIENT
Start: 2023-08-13 | End: 2023-08-13

## 2023-08-13 RX ADMIN — SODIUM CHLORIDE, PRESERVATIVE FREE 2 ML: 5 INJECTION INTRAVENOUS at 08:44

## 2023-08-13 RX ADMIN — ACETAMINOPHEN 650 MG: 325 TABLET ORAL at 08:44

## 2023-08-13 RX ADMIN — ENOXAPARIN SODIUM 40 MG: 40 INJECTION SUBCUTANEOUS at 08:44

## 2023-08-13 RX ADMIN — GADOBUTROL 10 ML: 604.72 INJECTION INTRAVENOUS at 14:20

## 2023-08-13 RX ADMIN — ALPRAZOLAM 0.5 MG: 0.25 TABLET ORAL at 12:55

## 2023-08-13 RX ADMIN — TOPIRAMATE 25 MG: 25 TABLET, FILM COATED ORAL at 08:44

## 2023-08-13 ASSESSMENT — PAIN SCALES - GENERAL
PAINLEVEL_OUTOF10: 0
PAINLEVEL_OUTOF10: 3

## 2023-08-25 ENCOUNTER — OFFICE VISIT (OUTPATIENT)
Dept: FAMILY MEDICINE CLINIC | Facility: CLINIC | Age: 53
End: 2023-08-25

## 2023-08-25 VITALS
DIASTOLIC BLOOD PRESSURE: 78 MMHG | WEIGHT: 193 LBS | HEART RATE: 74 BPM | SYSTOLIC BLOOD PRESSURE: 110 MMHG | BODY MASS INDEX: 32.15 KG/M2 | HEIGHT: 65 IN

## 2023-08-25 DIAGNOSIS — G45.9 TIA (TRANSIENT ISCHEMIC ATTACK): ICD-10-CM

## 2023-08-25 DIAGNOSIS — Z09 HOSPITAL DISCHARGE FOLLOW-UP: Primary | ICD-10-CM

## 2023-08-25 PROCEDURE — 3074F SYST BP LT 130 MM HG: CPT | Performed by: NURSE PRACTITIONER

## 2023-08-25 PROCEDURE — 3008F BODY MASS INDEX DOCD: CPT | Performed by: NURSE PRACTITIONER

## 2023-08-25 PROCEDURE — 99213 OFFICE O/P EST LOW 20 MIN: CPT | Performed by: NURSE PRACTITIONER

## 2023-08-25 PROCEDURE — 3078F DIAST BP <80 MM HG: CPT | Performed by: NURSE PRACTITIONER

## 2023-09-13 ENCOUNTER — OFFICE VISIT (OUTPATIENT)
Dept: NEUROLOGY | Facility: CLINIC | Age: 53
End: 2023-09-13
Payer: COMMERCIAL

## 2023-09-13 VITALS
BODY MASS INDEX: 32.65 KG/M2 | DIASTOLIC BLOOD PRESSURE: 80 MMHG | HEIGHT: 65 IN | SYSTOLIC BLOOD PRESSURE: 110 MMHG | WEIGHT: 196 LBS

## 2023-09-13 DIAGNOSIS — G45.9 TIA (TRANSIENT ISCHEMIC ATTACK): Primary | ICD-10-CM

## 2023-09-13 DIAGNOSIS — R29.818 FUNCTIONAL NEUROLOGIC COMPLAINT: ICD-10-CM

## 2023-09-13 DIAGNOSIS — R41.89 BRAIN FOG: ICD-10-CM

## 2023-09-13 PROCEDURE — 99204 OFFICE O/P NEW MOD 45 MIN: CPT | Performed by: OTHER

## 2023-09-13 PROCEDURE — 3074F SYST BP LT 130 MM HG: CPT | Performed by: OTHER

## 2023-09-13 PROCEDURE — 3079F DIAST BP 80-89 MM HG: CPT | Performed by: OTHER

## 2023-09-13 PROCEDURE — 3008F BODY MASS INDEX DOCD: CPT | Performed by: OTHER

## 2023-09-13 RX ORDER — ASPIRIN 81 MG/1
81 TABLET ORAL DAILY
COMMUNITY

## 2023-09-20 ENCOUNTER — HOSPITAL ENCOUNTER (OUTPATIENT)
Dept: ULTRASOUND IMAGING | Age: 53
Discharge: HOME | End: 2023-09-20
Attending: Other
Payer: COMMERCIAL

## 2023-09-20 DIAGNOSIS — G45.9 TIA (TRANSIENT ISCHEMIC ATTACK): ICD-10-CM

## 2023-09-20 PROCEDURE — 93880 EXTRACRANIAL BILAT STUDY: CPT | Performed by: OTHER

## 2023-09-21 ENCOUNTER — TELEPHONE (OUTPATIENT)
Dept: NEUROLOGY | Facility: CLINIC | Age: 53
End: 2023-09-21

## 2023-09-21 NOTE — TELEPHONE ENCOUNTER
----- Message from Tanner Zazueta MD sent at 9/21/2023 10:32 AM CDT -----  Please let the patient know that doppler of carotids didn't show significant stenosis (narrowing).     Thank you

## 2023-09-29 ENCOUNTER — HOSPITAL ENCOUNTER (OUTPATIENT)
Dept: CV DIAGNOSTICS | Age: 53
Discharge: HOME | End: 2023-09-29
Attending: Other
Payer: COMMERCIAL

## 2023-09-29 DIAGNOSIS — G45.9 TIA (TRANSIENT ISCHEMIC ATTACK): ICD-10-CM

## 2023-09-29 PROCEDURE — 93225 XTRNL ECG REC<48 HRS REC: CPT | Performed by: OTHER

## 2023-09-29 PROCEDURE — 93227 XTRNL ECG REC<48 HR R&I: CPT | Performed by: OTHER

## 2023-09-29 PROCEDURE — 93226 XTRNL ECG REC<48 HR SCAN A/R: CPT | Performed by: OTHER

## 2023-10-09 ENCOUNTER — TELEPHONE (OUTPATIENT)
Dept: NEUROLOGY | Facility: CLINIC | Age: 53
End: 2023-10-09

## 2023-10-09 NOTE — TELEPHONE ENCOUNTER
----- Message from Devante Ruiz MD sent at 10/9/2023  8:09 AM CDT -----  Please let the patient know that cardiac monitor didn't show signs of the a. Fib that could have contributed to the TIA/stroke symptoms.
PAST SURGICAL HISTORY:  History of   +

## 2023-10-13 ENCOUNTER — OFFICE VISIT (OUTPATIENT)
Dept: FAMILY MEDICINE CLINIC | Facility: CLINIC | Age: 53
End: 2023-10-13

## 2023-10-13 ENCOUNTER — PATIENT MESSAGE (OUTPATIENT)
Dept: FAMILY MEDICINE CLINIC | Facility: CLINIC | Age: 53
End: 2023-10-13

## 2023-10-13 VITALS
SYSTOLIC BLOOD PRESSURE: 118 MMHG | HEART RATE: 76 BPM | WEIGHT: 201.38 LBS | DIASTOLIC BLOOD PRESSURE: 80 MMHG | BODY MASS INDEX: 34 KG/M2 | TEMPERATURE: 98 F

## 2023-10-13 DIAGNOSIS — E66.9 CLASS 1 OBESITY WITH BODY MASS INDEX (BMI) OF 33.0 TO 33.9 IN ADULT, UNSPECIFIED OBESITY TYPE, UNSPECIFIED WHETHER SERIOUS COMORBIDITY PRESENT: Primary | ICD-10-CM

## 2023-10-13 PROCEDURE — 3079F DIAST BP 80-89 MM HG: CPT | Performed by: NURSE PRACTITIONER

## 2023-10-13 PROCEDURE — 3074F SYST BP LT 130 MM HG: CPT | Performed by: NURSE PRACTITIONER

## 2023-10-13 PROCEDURE — 99213 OFFICE O/P EST LOW 20 MIN: CPT | Performed by: NURSE PRACTITIONER

## 2023-10-16 NOTE — TELEPHONE ENCOUNTER
semaglutide-weight management 0.25 MG/0.5ML Subcutaneous Solution Auto-injector 2 mL 0 10/13/2023 11/4/2023    Sig - Route: Inject 0.5 mL (0.25 mg total) into the skin once a week for 4 doses. - Subcutaneous    Sent to pharmacy as: Semaglutide-Weight Management 0.25 MG/0.5ML Subcutaneous Solution Auto-injector (UTCUVR)    E-Prescribing Status: Receipt confirmed by pharmacy (10/13/2023 11:57 AM CDT)    [id number] [place] [facility] [address] AT [address], [phone], [phone]     I called [facility] above, spoke with Sadiq/sanjeev, he states that they did receive the Rx, however the dosage is on back-order -- they have 1.7 and 2.4 ML in stock now. [employer] message sent to patient in response to [employer] message received--->see message.     PHILLIP Abraham to see [employer] message and advise/order

## 2023-10-17 ENCOUNTER — TELEPHONE (OUTPATIENT)
Dept: NEUROLOGY | Facility: CLINIC | Age: 53
End: 2023-10-17

## 2023-10-17 ENCOUNTER — HOSPITAL ENCOUNTER (OUTPATIENT)
Dept: CV DIAGNOSTICS | Facility: HOSPITAL | Age: 53
Discharge: HOME | End: 2023-10-17
Attending: Other
Payer: COMMERCIAL

## 2023-10-17 ENCOUNTER — HOSPITAL ENCOUNTER (OUTPATIENT)
Dept: MAMMOGRAPHY | Age: 53
Discharge: HOME | End: 2023-10-17
Attending: NURSE PRACTITIONER
Payer: COMMERCIAL

## 2023-10-17 DIAGNOSIS — Z12.31 ENCOUNTER FOR SCREENING MAMMOGRAM FOR MALIGNANT NEOPLASM OF BREAST: ICD-10-CM

## 2023-10-17 DIAGNOSIS — G45.9 TIA (TRANSIENT ISCHEMIC ATTACK): ICD-10-CM

## 2023-10-17 PROCEDURE — 77063 BREAST TOMOSYNTHESIS BI: CPT | Performed by: NURSE PRACTITIONER

## 2023-10-17 PROCEDURE — 93306 TTE W/DOPPLER COMPLETE: CPT | Performed by: OTHER

## 2023-10-17 PROCEDURE — 77067 SCR MAMMO BI INCL CAD: CPT | Performed by: NURSE PRACTITIONER

## 2023-10-17 RX ORDER — SEMAGLUTIDE 0.68 MG/ML
0.25 INJECTION, SOLUTION SUBCUTANEOUS WEEKLY
Qty: 1 EACH | Refills: 0 | Status: SHIPPED | OUTPATIENT
Start: 2023-10-17 | End: 2023-10-18

## 2023-10-17 NOTE — TELEPHONE ENCOUNTER
----- Message from Celina Robison MD sent at 10/17/2023  3:00 PM CDT -----  Please let the patient know that echocardiogram didn't show signs of the a. Fib or any holes that could have contributed to the TIA/stroke symptoms.

## 2023-10-18 ENCOUNTER — HOSPITAL ENCOUNTER (OUTPATIENT)
Dept: MAMMOGRAPHY | Facility: HOSPITAL | Age: 53
Discharge: HOME | End: 2023-10-18
Attending: NURSE PRACTITIONER
Payer: COMMERCIAL

## 2023-10-18 ENCOUNTER — HOSPITAL ENCOUNTER (OUTPATIENT)
Dept: ULTRASOUND IMAGING | Facility: HOSPITAL | Age: 53
Discharge: HOME | End: 2023-10-18
Attending: NURSE PRACTITIONER
Payer: COMMERCIAL

## 2023-10-18 ENCOUNTER — TELEPHONE (OUTPATIENT)
Dept: FAMILY MEDICINE CLINIC | Facility: CLINIC | Age: 53
End: 2023-10-18

## 2023-10-18 DIAGNOSIS — R92.8 ABNORMAL MAMMOGRAM: ICD-10-CM

## 2023-10-18 DIAGNOSIS — E66.9 CLASS 1 OBESITY WITH BODY MASS INDEX (BMI) OF 33.0 TO 33.9 IN ADULT, UNSPECIFIED OBESITY TYPE, UNSPECIFIED WHETHER SERIOUS COMORBIDITY PRESENT: Primary | ICD-10-CM

## 2023-10-18 DIAGNOSIS — R73.03 PREDIABETES: ICD-10-CM

## 2023-10-18 PROCEDURE — 77065 DX MAMMO INCL CAD UNI: CPT | Performed by: NURSE PRACTITIONER

## 2023-10-18 PROCEDURE — 77061 BREAST TOMOSYNTHESIS UNI: CPT | Performed by: NURSE PRACTITIONER

## 2023-10-18 PROCEDURE — 76642 ULTRASOUND BREAST LIMITED: CPT | Performed by: NURSE PRACTITIONER

## 2023-10-18 RX ORDER — SEMAGLUTIDE 0.68 MG/ML
0.25 INJECTION, SOLUTION SUBCUTANEOUS WEEKLY
Qty: 1 EACH | Refills: 0 | Status: SHIPPED | OUTPATIENT
Start: 2023-10-18 | End: 2023-10-19

## 2023-10-18 NOTE — TELEPHONE ENCOUNTER
Patient requesting Ozempic script be sent to [address] [place] [address].    Medication pended for review

## 2023-10-18 NOTE — TELEPHONE ENCOUNTER
On call note: Was called on 10/18/23 by Shanti Ayoub about ozempic prescription that dosage needs to be increased as past starting dose time period. Pharmacist state should be higher dose now. Will forward message to [facility] who prescribed this.

## 2023-10-19 ENCOUNTER — TELEPHONE (OUTPATIENT)
Dept: FAMILY MEDICINE CLINIC | Facility: CLINIC | Age: 53
End: 2023-10-19

## 2023-10-19 RX ORDER — SEMAGLUTIDE 0.68 MG/ML
INJECTION, SOLUTION SUBCUTANEOUS
Qty: 1 EACH | Refills: 0 | Status: SHIPPED | OUTPATIENT
Start: 2023-10-19

## 2023-10-19 RX ORDER — SEMAGLUTIDE 0.68 MG/ML
0.25 INJECTION, SOLUTION SUBCUTANEOUS WEEKLY
Qty: 1 EACH | Refills: 0 | Status: SHIPPED | OUTPATIENT
Start: 2023-10-19 | End: 2023-10-19

## 2023-10-19 NOTE — TELEPHONE ENCOUNTER
I have dicussed this with the patient and told her that she does not meet the criteria for it to be covered by insurance. She was adamant about trying to push it though regardless. Thanks so much for changing instructions for me. I will resend. Patient can then decide if she would like to pay out of pocket.

## 2023-10-19 NOTE — TELEPHONE ENCOUNTER
Pattie Howe received a call from [address] [place] [address] that they received the script you sent in today but the directions are still not correct. I have pended the script with the directions that it should have per pharmacist. Per pharmacist it should say \"Inject 0.25 mg for 4 weeks and then increase to 0.5 mg ongoing\" The pharmacist also want to let you know that pt informed her it was for weight lose use. Per pharmacist with pt insurance this medication will be costing $900. Pharmacist feels that pt thinks by changing the direction on the medication it will change the price but it will not. She just wanted to make you aware. semaglutide (OZEMPIC, 0.25 OR 0.5 MG/DOSE,) 2 MG/3ML Subcutaneous Solution Pen-injector 1 each 0 10/19/2023    Sig:   Inject 0.25 mg into the skin once a week. Route:   Subcutaneous     Note to Pharmacy: This is a new rx for this patient. She has never taken this medication before.      Order #:   [id number]

## 2023-11-13 RX ORDER — SEMAGLUTIDE 0.68 MG/ML
INJECTION, SOLUTION SUBCUTANEOUS
Qty: 3 ML | Refills: 3 | Status: SHIPPED | OUTPATIENT
Start: 2023-11-13

## 2023-11-13 NOTE — TELEPHONE ENCOUNTER
Pharmacy calling, confirmed patient's name and . Patient received a defective ozempic syringe therefore pharmacy needs a new script.

## 2023-11-21 ENCOUNTER — TELEPHONE (OUTPATIENT)
Dept: FAMILY MEDICINE CLINIC | Facility: CLINIC | Age: 53
End: 2023-11-21

## 2023-11-21 NOTE — TELEPHONE ENCOUNTER
Patient calling to request a refill of:    semaglutide (OZEMPIC, 0.25 OR 0.5 MG/DOSE,) 2 MG/3ML Subcutaneous Solution     Please send to:  [id number] - [address] [place]

## 2023-11-22 ENCOUNTER — PATIENT MESSAGE (OUTPATIENT)
Dept: FAMILY MEDICINE CLINIC | Facility: CLINIC | Age: 53
End: 2023-11-22

## 2023-11-22 NOTE — TELEPHONE ENCOUNTER
Please review; protocol failed. Please see patients [address]  P Em Triage SupportYesterday (2:33 PM)     RA  Refills have been requested for the following medications:         semaglutide (OZEMPIC, 0.25 OR 0.5 MG/DOSE,) 2 MG/3ML Subcutaneous Solution Pen-injector Nelly Buenrostro      Patient Comment: Hi Ivonne Kathy want to apologize for todays appointment, i had it in my calendar for tomorrow. I am wondering if i could have a refill foe the ozempic, [place] will be [place] of [place], as soon as i am back i would reschedule my appointment. thanks      Requested Prescriptions   Pending Prescriptions Disp Refills    semaglutide (OZEMPIC, 0.25 OR 0.5 MG/DOSE,) 2 MG/3ML Subcutaneous Solution Pen-injector 3 mL 3     Sig: Inject 0.25mg once weekly for 4 weeks and then increase to 0.5 mg once weekly ongoing.        Diabetes Medication Protocol Failed - 11/21/2023  2:33 PM        Failed - Last A1C < 7.5 and within past 6 months     No results found for: \"A1C\"          Passed - In person appointment or virtual visit in the past 6 mos or appointment in next 3 mos     Recent Outpatient Visits              1 month ago Class 1 obesity with body mass index (BMI) of 33.0 to 33.9 in adult, unspecified obesity type, unspecified whether serious comorbidity present    [address], [address], [address], APRN    Office Visit    2 months ago TIA (transient ischemic attack)    [address], [address], Bruce Sunshine MD    Office Visit    2 months ago Hospital discharge follow-up    [address], [address], [address], APRN    Office Visit    3 months ago Right kidney mass    [address], [address], [address], APRN    Office Visit    4 months ago Right kidney mass    [address], [address], [address] [facility] Francisca Gusman, [address] [place] [address] EGFRCR or GFRNAA > 50     GFR Evaluation  EGFRCR: 84 , resulted on 2/23/2023          Passed - GFR in the past 12 months           Recent Outpatient Visits              1 month ago Class 1 obesity with body mass index (BMI) of 33.0 to 33.9 in adult, unspecified obesity type, unspecified whether serious comorbidity present    [address], [address], [address], APRN    Office Visit    2 months ago TIA (transient ischemic attack)    [address], [address], Dalila Adame MD    Office Visit    2 months ago Hospital discharge follow-up    [address], [address], [address], APRN    Office Visit    3 months ago Right kidney mass    [address], [address], [address], APRN    Office Visit    4 months ago Right kidney mass    [address] [facility], [address] [place] [address] Francisca Gusman, APRN    Office Visit

## 2023-11-26 RX ORDER — SEMAGLUTIDE 0.68 MG/ML
INJECTION, SOLUTION SUBCUTANEOUS
Qty: 3 ML | Refills: 0 | Status: SHIPPED | OUTPATIENT
Start: 2023-11-26

## 2024-03-05 ENCOUNTER — OFFICE VISIT (OUTPATIENT)
Dept: FAMILY MEDICINE CLINIC | Facility: CLINIC | Age: 54
End: 2024-03-05
Payer: COMMERCIAL

## 2024-03-05 VITALS
HEIGHT: 65 IN | BODY MASS INDEX: 32.49 KG/M2 | HEART RATE: 75 BPM | DIASTOLIC BLOOD PRESSURE: 82 MMHG | WEIGHT: 195 LBS | SYSTOLIC BLOOD PRESSURE: 123 MMHG

## 2024-03-05 DIAGNOSIS — Z90.49 STATUS POST APPENDECTOMY: Primary | ICD-10-CM

## 2024-03-05 DIAGNOSIS — F41.9 ANXIETY: ICD-10-CM

## 2024-03-05 DIAGNOSIS — K59.00 CONSTIPATION, UNSPECIFIED CONSTIPATION TYPE: ICD-10-CM

## 2024-03-05 DIAGNOSIS — Z90.721 STATUS POST RIGHT OOPHORECTOMY: ICD-10-CM

## 2024-03-05 DIAGNOSIS — R30.0 DYSURIA: ICD-10-CM

## 2024-03-05 LAB
APPEARANCE: CLEAR
BILIRUBIN: NEGATIVE
GLUCOSE (URINE DIPSTICK): NEGATIVE MG/DL
KETONES (URINE DIPSTICK): NEGATIVE MG/DL
LEUKOCYTES: NEGATIVE
MULTISTIX LOT#: ABNORMAL NUMERIC
NITRITE, URINE: NEGATIVE
PH, URINE: 5.5 (ref 4.5–8)
PROTEIN (URINE DIPSTICK): NEGATIVE MG/DL
SPECIFIC GRAVITY: 1.01 (ref 1–1.03)
URINE-COLOR: YELLOW
UROBILINOGEN,SEMI-QN: 0.2 MG/DL (ref 0–1.9)

## 2024-03-05 PROCEDURE — 81002 URINALYSIS NONAUTO W/O SCOPE: CPT | Performed by: NURSE PRACTITIONER

## 2024-03-05 PROCEDURE — 99214 OFFICE O/P EST MOD 30 MIN: CPT | Performed by: NURSE PRACTITIONER

## 2024-03-05 RX ORDER — DOCUSATE SODIUM 100 MG/1
100 CAPSULE, LIQUID FILLED ORAL DAILY
Qty: 30 CAPSULE | Refills: 1 | Status: SHIPPED | OUTPATIENT
Start: 2024-03-05

## 2024-03-05 RX ORDER — HYDROXYZINE HYDROCHLORIDE 10 MG/1
TABLET, FILM COATED ORAL
Qty: 60 TABLET | Refills: 0 | Status: SHIPPED | OUTPATIENT
Start: 2024-03-05

## 2024-03-05 NOTE — PROGRESS NOTES
HPI    Patient presents for post op follow up.  Had appendicitis and right oophorectomy on 2/22.  Was not laparoscopic, was open.  Had completed in [place] emergently.  Still in some pain but manageable.  With some constipation.  Has been having dysuria since 2/24 and wakingup multiple times a night to urinate.  Also with bad anxiety since surgery.  Has been thinking about surgery a lot since she was awake for surgery.  Did not have general anesthesia; only had a spinal epidural.  Feels traumatized.  Thinks that one suture was left behind and not removed during suture removal prior to returning home.      Review of Systems   Gastrointestinal:  Positive for abdominal pain and constipation.   Genitourinary:  Positive for dysuria.   Psychiatric/Behavioral:  The patient is nervous/anxious.    All other systems reviewed and are negative.       Vitals:    03/05/24 1140   BP: 123/82   Pulse: 75   Weight: 195 lb (88.5 kg)   Height: 5' 5\" (1.651 m)     Body mass index is 32.45 kg/m².    Health Maintenance   Topic Date Due    Annual Physical  Never done    Colorectal Cancer Screening  Never done    Zoster Vaccines (1 of 2) Never done    Influenza Vaccine (1) 10/01/2023    Annual Depression Screening  Never done    DTaP,Tdap,and Td Vaccines (2 - Td or Tdap) 08/02/2024 (Originally 9/30/2021)    Mammogram  10/18/2024    Pneumococcal Vaccine: Birth to 64yrs  Aged Out       No LMP recorded. Patient has had a hysterectomy.    Past Medical History:   Diagnosis Date    Depression     Esophageal reflux        .  Past Surgical History:   Procedure Laterality Date    Appendectomy  02/22/2024    Cholecystectomy  01/01/1991    Hysterectomy  01/01/2013    partial hysterectomy, has ovaries    Removal of ovary(s) Right 02/22/2024    RT ovary    Tubal ligation  18 years ago       Family History   Problem Relation Age of Onset    Hypertension Mother     Diabetes Father     Prostate Cancer Father 65    Cancer Paternal Grandmother        Social  History     Socioeconomic History    Marital status:      Spouse name: Not on file    Number of children: Not on file    Years of education: Not on file    Highest education level: Not on file   Occupational History    Not on file   Tobacco Use    Smoking status: Former     Years: 4     Types: Cigarettes    Smokeless tobacco: Former     Quit date: 12/29/2013   Substance and Sexual Activity    Alcohol use: Yes     Alcohol/week: 0.0 - 1.0 standard drinks of alcohol    Drug use: No    Sexual activity: Not on file   Other Topics Concern    Not on file   Social History Narrative    Not on file     Social Determinants of Health     Financial Resource Strain: Not on file   Food Insecurity: Not on file   Transportation Needs: Not on file   Physical Activity: Not on file   Stress: Not on file   Social Connections: Not on file   Housing Stability: Not on file       Current Outpatient Medications   Medication Sig Dispense Refill    docusate sodium 100 MG Oral Cap Take 1 capsule (100 mg total) by mouth daily. 30 capsule 1    hydrOXYzine 10 MG Oral Tab Take one to two tablets by mouth every 6 hours as needed for anxiety 60 tablet 0    aspirin 81 MG Oral Tab EC Take 1 tablet (81 mg total) by mouth daily.         Allergies:  Allergies   Allergen Reactions    Lexapro [Escitalopram]        Physical Exam  Vitals and nursing note reviewed.   Constitutional:       General: She is not in acute distress.     Appearance: Normal appearance. She is not ill-appearing.   HENT:      Head: Normocephalic and atraumatic.   Cardiovascular:      Rate and Rhythm: Normal rate and regular rhythm.      Heart sounds: Normal heart sounds. No murmur heard.  Pulmonary:      Effort: Pulmonary effort is normal. No respiratory distress.      Breath sounds: Normal breath sounds. No stridor. No wheezing, rhonchi or rales.   Chest:      Chest wall: No tenderness.   Abdominal:      General: There is no distension.      Palpations: There is no mass.       Tenderness: There is abdominal tenderness. There is no guarding or rebound.      Hernia: No hernia is present.   Skin:     General: Skin is warm and dry.   Neurological:      Mental Status: She is alert and oriented to person, place, and time.   Psychiatric:         Mood and Affect: Mood normal.         Behavior: Behavior normal.         Thought Content: Thought content normal.         Judgment: Judgment normal.          Assessment and Plan:  Problem List Items Addressed This Visit          Genitourinary and Reproductive    Dysuria    Relevant Orders    POC Urinalysis, Manual Dip without microscopy [48207] (Completed)    Urine Culture, Routine     Other Visit Diagnoses       Status post appendectomy    -  Primary    Relevant Medications    docusate sodium 100 MG Oral Cap    Constipation, unspecified constipation type        Relevant Medications    docusate sodium 100 MG Oral Cap    Status post right oophorectomy        Relevant Medications    docusate sodium 100 MG Oral Cap    Anxiety        Relevant Medications    hydrOXYzine 10 MG Oral Tab           UA with mod blood, will send for culture.  Colace daily, miralax as needed.  One suture removed without difficulty.  May take hydroxyzine as needed for anxiety.  To consider therapy in the future.       Discussed plan of care with patient and patient is in agreement.  All questions answered. Patient to call with questions or concerns.    Encouraged to sign up for My Chart if not already registered.

## 2024-03-07 ENCOUNTER — PATIENT MESSAGE (OUTPATIENT)
Dept: FAMILY MEDICINE CLINIC | Facility: CLINIC | Age: 54
End: 2024-03-07

## 2024-03-07 NOTE — TELEPHONE ENCOUNTER
From: Lissette Block  To: Vee Dowell  Sent: 3/7/2024 7:28 AM CST  Subject: Rash     Good morning Vee,    I am concern because I am breaking out in a rash near the incision area? I forgot to tell you the day of my visit that I had a rash under my right breast like right after my surgery, now I see it in my stomach area, I am not sure if this is normal? Please let me know if I should schedule an appointment to see you.  Thank you

## 2024-04-08 ENCOUNTER — LAB ENCOUNTER (OUTPATIENT)
Dept: LAB | Facility: HOSPITAL | Age: 54
End: 2024-04-08
Attending: INTERNAL MEDICINE
Payer: COMMERCIAL

## 2024-04-08 ENCOUNTER — OFFICE VISIT (OUTPATIENT)
Dept: ENDOCRINOLOGY CLINIC | Facility: CLINIC | Age: 54
End: 2024-04-08
Payer: COMMERCIAL

## 2024-04-08 VITALS
WEIGHT: 205 LBS | BODY MASS INDEX: 34.16 KG/M2 | SYSTOLIC BLOOD PRESSURE: 115 MMHG | HEIGHT: 65 IN | HEART RATE: 92 BPM | DIASTOLIC BLOOD PRESSURE: 69 MMHG

## 2024-04-08 DIAGNOSIS — R23.2 HOT FLASHES: ICD-10-CM

## 2024-04-08 DIAGNOSIS — N95.1 MENOPAUSAL HOT FLUSHES: Primary | ICD-10-CM

## 2024-04-08 DIAGNOSIS — R63.5 WEIGHT GAIN: ICD-10-CM

## 2024-04-08 DIAGNOSIS — R45.86 MOOD SWING: ICD-10-CM

## 2024-04-08 LAB
ESTRADIOL SERPL-MCNC: 75.6 PG/ML
FOLATE SERPL-MCNC: 24 NG/ML (ref 5.4–?)
FSH SERPL-ACNC: 25.1 MIU/ML
LH SERPL-ACNC: 19.1 MIU/ML
T PALLIDUM AB SER QL IA: NONREACTIVE
TSI SER-ACNC: 2.76 MIU/ML (ref 0.55–4.78)
VIT B12 SERPL-MCNC: 816 PG/ML (ref 211–911)

## 2024-04-08 PROCEDURE — 82607 VITAMIN B-12: CPT | Performed by: OTHER

## 2024-04-08 PROCEDURE — 82746 ASSAY OF FOLIC ACID SERUM: CPT | Performed by: OTHER

## 2024-04-08 PROCEDURE — 83001 ASSAY OF GONADOTROPIN (FSH): CPT | Performed by: OTHER

## 2024-04-08 PROCEDURE — 36415 COLL VENOUS BLD VENIPUNCTURE: CPT | Performed by: INTERNAL MEDICINE

## 2024-04-08 PROCEDURE — 82670 ASSAY OF TOTAL ESTRADIOL: CPT | Performed by: OTHER

## 2024-04-08 PROCEDURE — 83002 ASSAY OF GONADOTROPIN (LH): CPT | Performed by: OTHER

## 2024-04-08 PROCEDURE — 99205 OFFICE O/P NEW HI 60 MIN: CPT | Performed by: INTERNAL MEDICINE

## 2024-04-08 PROCEDURE — 86780 TREPONEMA PALLIDUM: CPT | Performed by: OTHER

## 2024-04-08 PROCEDURE — 84443 ASSAY THYROID STIM HORMONE: CPT | Performed by: INTERNAL MEDICINE

## 2024-04-08 RX ORDER — GABAPENTIN 100 MG/1
100 CAPSULE ORAL NIGHTLY
Qty: 90 CAPSULE | Refills: 0 | Status: SHIPPED | OUTPATIENT
Start: 2024-04-08 | End: 2024-07-07

## 2024-04-08 NOTE — PATIENT INSTRUCTIONS
Labs today   Start gabapentin 100 mg at bedtime   If well tolerated, can increase slowly to 300 mg /night   It can cause sleepiness     F/u with neurology   Given recent TIA, we discussed risk/benefit from hormonal treatment /estrogen replacement in her case     RTC in 2 mo

## 2024-04-08 NOTE — PROGRESS NOTES
New Patient Evaluation - History and Physical    CONSULT - Reason for Visit:  hormone levels .    Requesting Physician: Vee Dowell   ..PHYSICIAN NONSTAFF    CHIEF COMPLAINT:    Chief Complaint   Patient presents with    Consult     Hormones         HISTORY OF PRESENT ILLNESS:   Lissette Block is a 53 year old female who presents with symptoms concerning for MP. Has hx of TIA 9/2023, HYS ~ 15 yrs  ago and rt oophorectomy 3/2024  In 9/2023 TIA c/o numbness and twitching  for ~ 5 days  Seeing Geovanny Coy MD . I reviewed notes and w/u.     Has Hot flashes , mood swings, gaining wt,  fatigue, and lack of motivation.  Dryness in vagina and skin   No hair changes     She had Rt ovary removed in 3/2024 in [place]. She reports pathology is benign. She cannot recall periMP sx prior. She had HYS ~ 15 yrs ago for excessive bleeding.    Cannot tell the age of MP in family/ Sisters young   UTD on mammo   No smoking  No results found for: \"A1C\"   ASSESSMENT AND PLAN:    Lissette Block is a 53 year old female who presents with symptoms concerning for MP. Has hx of TIA 9/2023, HYS ~ 15 yrs  ago and rt oophorectomy 3/2024  I d/w pt in length she had a TIA at younger age and recently so will wait till the w/u is done. She understands risk benefit and indication for more w/u.   She will be a candidate for non hormonal tx.   She is allergic to Escitalopram so will avoid other SSRI - will use gabapentin. We discussed  complications and risk/benefit    - update labs showed E2 75  with FSH 25  Plan  Labs today   Start gabapentin 100 mg at bedtime   If well tolerated, can increase slowly to 300 mg /night   It can cause sleepiness     F/u with neurology   Given recent TIA, we discussed risk/benefit from hormonal treatment /estrogen replacement in her case     RTC in 2 mo         PAST MEDICAL HISTORY:   Past Medical History:   Diagnosis Date    Depression     Esophageal reflux    TIA     PAST SURGICAL HISTORY:    Past Surgical History:   Procedure Laterality Date    APPENDECTOMY  02/22/2024    CHOLECYSTECTOMY  01/01/1991    HYSTERECTOMY  01/01/2013    partial hysterectomy, has ovaries    REMOVAL OF OVARY(S) Right 02/22/2024    RT ovary    TUBAL LIGATION  18 years ago       CURRENT MEDICATIONS:     gabapentin 100 MG Oral Cap Take 1 capsule (100 mg total) by mouth nightly. 90 capsule 0    docusate sodium 100 MG Oral Cap Take 1 capsule (100 mg total) by mouth daily. 30 capsule 1    hydrOXYzine 10 MG Oral Tab Take one to two tablets by mouth every 6 hours as needed for anxiety 60 tablet 0    aspirin 81 MG Oral Tab EC Take 1 tablet (81 mg total) by mouth daily.         ALLERGIES:  Allergies   Allergen Reactions    Lexapro [Escitalopram]        SOCIAL HISTORY:    Social History     Socioeconomic History    Marital status:    Tobacco Use    Smoking status: Former     Years: 4     Types: Cigarettes    Smokeless tobacco: Former     Quit date: 12/29/2013   Substance and Sexual Activity    Alcohol use: Yes     Alcohol/week: 0.0 - 1.0 standard drinks of alcohol    Drug use: No   Does not smoke   No drugs       FAMILY HISTORY:   Family History   Problem Relation Age of Onset    Hypertension Mother     Diabetes Father     Prostate Cancer Father 65    Cancer Paternal Grandmother      F prostate ca  Lung cancer and brain tumor in grandparents   Skin cancer   GF CAD and death when was 52  REVIEW OF SYSTEMS:  All negative other than HPI    PHYSICAL EXAM:   Height: 5' 5\" (165.1 cm) (04/08 1008)  Weight: 205 lb (93 kg) (04/08 1008)  BSA (Calculated - sq m): 2 sq meters (04/08 1008)  Pulse: 92 (04/08 1008)  BP: 115/69 (04/08 1008)  Temp: --  Do Not Use - Resp Rate: --  SpO2: --    Body mass index is 34.11 kg/m².  Obese   No goiter   No tremors   No acne   No acanthosis   CONSTITUTIONAL:  Awake and alert. Age appropriate, good hygiene not in acute distress. Well-nourished and well developed. no acute distress   PSYCH:   Orientated to  time, place, person & situation, Normal mood and affect, memory intact, normal insight and judgment, cooperative  Neuro: speech is clear. Awake, alert, no aphasia, no facial asymmetry, no nuchal rigidity  EYES:  No proptosis, no ptosis, conjunctiva normal  ENT:  Normocephalic, atraumatic  Eye: EOMI, normal lids, no discharge, no conjunctival erythema. No exophthalmos/proptosis, Ptosis negative   No rhinorrhea, moist oral mucosa  Neck: full range of motion  Neck/Thyroid: neck inspection: normal, No scar, No goiter   LUNGS:  No acute respiratory distress, non-labored respiration. Speaking full sentences  CARDIOVASCULAR:  regular rate   ABDOMEN:  No abdominal pain.   SKIN:  no bruising or bleeding, no rashes and no lesions, Skin is dry, no obvious rashes or lesions  EXTREMITIES: no gross abnormality   MSK: Moves extremities spontaneously. full range of motion in all major joints      DATA:     Pertinent data reviewed  US carotid 9/2023  1. Normal bilateral carotid duplex sonogram.  No significant plaque or stenosis.   2. Antegrade flow within both vertebral arteries.    Latest Reference Range & Units 02/23/23 09:33   EGFR >=60 mL/min/1.73m2 84   ANION GAP 0 - 18 mmol/L 6   CALCULATED OSMOLALITY 275 - 295 mOsm/kg 282   ALKALINE PHOSPHATASE 41 - 108 U/L 69   AST (SGOT) 15 - 37 U/L 38 (H)   ALT (SGPT) 13 - 56 U/L 62 (H)       CT 2015  ADRENALS:           No mass or enlargement   CT 2023 LIVER: The liver is again mildly enlarged and demonstrates stable moderate steatosis.  A few coarse calcifications are again seen along the inferior tip of the right hepatic lobe chronic sequela of a healed granulomatous process.  Assessment of the liver    is otherwise limited without IV contrast.    PANCREAS: Negative unenhanced appearance for fluid collection, ductal dilatation, or atrophy.     ADRENALS:   No defined mass or abnormal enlargement      Recent Labs     04/08/24  1116   TSH 2.759     No results found.  FSH        Component  Value Flag Ref Range Units Status    FSH 25.1      No established range for female sex mIU/mL Final                  LH (Luteinizing Hormone)        Component Value Flag Ref Range Units Status    LH 19.1      No established range for female sex mIU/mL Final    Comment:    Follicular phase:  1.9 - 12.5 mIU/ml  Midcycle:          8.7 - 76.3 mIU/ml  Luteal Phase:      0.5 - 16.9 mIU/ml  Postmenopausal:    5.0 - 55.2 mIU/ml  Pregnant:          < 0.1 - 1.5 mIU/ml                    Estradiol        Component Value Flag Ref Range Units Status    Estradiol 75.6      No established range for female sex pg/mL Final    Comment:    Follicular phase (-12 to -4 days):   19.5 - 144.2 pg/ml  Midcycle (-3 to +2 days):            63.9 - 356.7 pg/ml  Luteal Phase (+4 to +12 days):       55.8 - 214.2 pg/ml  Postmenopausal (untreated):          <= 32.2 pg/ml                    TSH W Reflex To Free T4        Component Value Flag Ref Range Units Status    TSH 2.759      0.550 - 4.780 mIU/mL Final                  Orders Placed This Encounter   Procedures    FSH    LH (Luteinizing Hormone)    Estradiol    TSH W Reflex To Free T4     Orders Placed This Encounter    FSH     Order Specific Question:   Release to patient     Answer:   Immediate    LH (Luteinizing Hormone)     Order Specific Question:   Release to patient     Answer:   Immediate    Estradiol     Order Specific Question:   Release to patient     Answer:   Immediate    TSH W Reflex To Free T4     Order Specific Question:   Release to patient     Answer:   Immediate    gabapentin 100 MG Oral Cap     Sig: Take 1 capsule (100 mg total) by mouth nightly.     Dispense:  90 capsule     Refill:  0          This is a specialized patient consultation in endocrinology and required comprehensive review of prior records, as well as current evaluation, with time required for consideration of complex endocrine issues and consultation. For this visit, I personally interviewed the patient, and  family member if accompanied, performed the pertinent parts of the history and physical examination. ROS included screening for appropriate endocrine conditions.   Today's diagnosis and plan were reviewed in detail with the patient who states understanding and agrees with plan. I discussed with the patient possible diagnosis, differential diagnosis, need for work up, treatment options, alternatives and side effects.     Please see note for details about time spent which includes:   · pre-visit preparation  · reviewing records  · face to face time with the patient   · timely documentation of the encounter  · ordering medications/tests  · communication with care team  · care coordination    I appreciate the opportunity to be part of your patient's medical care and will keep you, as the referring and primary physicians, informed about the care of your patient. Please feel free to contact me should you have any questions.      Jennifer David MD

## 2024-06-10 ENCOUNTER — HOSPITAL ENCOUNTER (EMERGENCY)
Facility: HOSPITAL | Age: 54
Discharge: HOME | End: 2024-06-10
Attending: EMERGENCY MEDICINE

## 2024-06-10 ENCOUNTER — APPOINTMENT (OUTPATIENT)
Dept: GENERAL RADIOLOGY | Facility: HOSPITAL | Age: 54
End: 2024-06-10

## 2024-06-10 ENCOUNTER — APPOINTMENT (OUTPATIENT)
Dept: CT IMAGING | Facility: HOSPITAL | Age: 54
End: 2024-06-10
Attending: EMERGENCY MEDICINE

## 2024-06-10 VITALS
SYSTOLIC BLOOD PRESSURE: 138 MMHG | RESPIRATION RATE: 15 BRPM | DIASTOLIC BLOOD PRESSURE: 85 MMHG | OXYGEN SATURATION: 99 % | HEART RATE: 73 BPM | TEMPERATURE: 98 F

## 2024-06-10 DIAGNOSIS — R79.89 ELEVATED LFTS: ICD-10-CM

## 2024-06-10 DIAGNOSIS — R07.9 CHEST PAIN OF UNCERTAIN ETIOLOGY: Primary | ICD-10-CM

## 2024-06-10 LAB
ALBUMIN SERPL-MCNC: 4.3 G/DL (ref 3.2–4.8)
ALBUMIN/GLOB SERPL: 1.2 {RATIO} (ref 1–2)
ALP LIVER SERPL-CCNC: 83 U/L
ALT SERPL-CCNC: 82 U/L
ANION GAP SERPL CALC-SCNC: 8 MMOL/L (ref 0–18)
AST SERPL-CCNC: 63 U/L (ref ?–34)
ATRIAL RATE: 79 BPM
BASOPHILS # BLD AUTO: 0.08 X10(3) UL (ref 0–0.2)
BASOPHILS NFR BLD AUTO: 0.8 %
BILIRUB SERPL-MCNC: 0.4 MG/DL (ref 0.3–1.2)
BILIRUB UR QL: NEGATIVE
BUN BLD-MCNC: 9 MG/DL (ref 9–23)
BUN/CREAT SERPL: 11.1 (ref 10–20)
CALCIUM BLD-MCNC: 9.2 MG/DL (ref 8.7–10.4)
CHLORIDE SERPL-SCNC: 108 MMOL/L (ref 98–112)
CLARITY UR: CLEAR
CO2 SERPL-SCNC: 23 MMOL/L (ref 21–32)
CREAT BLD-MCNC: 0.81 MG/DL
D DIMER PPP FEU-MCNC: 0.61 UG/ML FEU (ref ?–0.52)
DEPRECATED RDW RBC AUTO: 40.3 FL (ref 35.1–46.3)
EGFRCR SERPLBLD CKD-EPI 2021: 87 ML/MIN/1.73M2 (ref 60–?)
EOSINOPHIL # BLD AUTO: 0.33 X10(3) UL (ref 0–0.7)
EOSINOPHIL NFR BLD AUTO: 3.2 %
ERYTHROCYTE [DISTWIDTH] IN BLOOD BY AUTOMATED COUNT: 12.8 % (ref 11–15)
GLOBULIN PLAS-MCNC: 3.5 G/DL (ref 2–3.5)
GLUCOSE BLD-MCNC: 146 MG/DL (ref 70–99)
GLUCOSE UR-MCNC: NORMAL MG/DL
HCT VFR BLD AUTO: 43.6 %
HGB BLD-MCNC: 14.4 G/DL
IMM GRANULOCYTES # BLD AUTO: 0.05 X10(3) UL (ref 0–1)
IMM GRANULOCYTES NFR BLD: 0.5 %
KETONES UR-MCNC: NEGATIVE MG/DL
LEUKOCYTE ESTERASE UR QL STRIP.AUTO: 25
LIPASE SERPL-CCNC: 54 U/L (ref 13–75)
LYMPHOCYTES # BLD AUTO: 4.02 X10(3) UL (ref 1–4)
LYMPHOCYTES NFR BLD AUTO: 38.8 %
MCH RBC QN AUTO: 28.3 PG (ref 26–34)
MCHC RBC AUTO-ENTMCNC: 33 G/DL (ref 31–37)
MCV RBC AUTO: 85.8 FL
MONOCYTES # BLD AUTO: 1.4 X10(3) UL (ref 0.1–1)
MONOCYTES NFR BLD AUTO: 13.5 %
NEUTROPHILS # BLD AUTO: 4.47 X10 (3) UL (ref 1.5–7.7)
NEUTROPHILS # BLD AUTO: 4.47 X10(3) UL (ref 1.5–7.7)
NEUTROPHILS NFR BLD AUTO: 43.2 %
NITRITE UR QL STRIP.AUTO: NEGATIVE
OSMOLALITY SERPL CALC.SUM OF ELEC: 289 MOSM/KG (ref 275–295)
P AXIS: 44 DEGREES
P-R INTERVAL: 202 MS
PH UR: 6.5 [PH] (ref 5–8)
PLATELET # BLD AUTO: 344 10(3)UL (ref 150–450)
POTASSIUM SERPL-SCNC: 4.2 MMOL/L (ref 3.5–5.1)
PROT SERPL-MCNC: 7.8 G/DL (ref 5.7–8.2)
PROT UR-MCNC: NEGATIVE MG/DL
Q-T INTERVAL: 402 MS
QRS DURATION: 84 MS
QTC CALCULATION (BEZET): 460 MS
R AXIS: 34 DEGREES
RBC # BLD AUTO: 5.08 X10(6)UL
SODIUM SERPL-SCNC: 139 MMOL/L (ref 136–145)
SP GR UR STRIP: 1.02 (ref 1–1.03)
T AXIS: 44 DEGREES
TROPONIN I SERPL HS-MCNC: <3 NG/L
UROBILINOGEN UR STRIP-ACNC: NORMAL
VENTRICULAR RATE: 79 BPM
WBC # BLD AUTO: 10.4 X10(3) UL (ref 4–11)

## 2024-06-10 PROCEDURE — 85025 COMPLETE CBC W/AUTO DIFF WBC: CPT | Performed by: EMERGENCY MEDICINE

## 2024-06-10 PROCEDURE — 83690 ASSAY OF LIPASE: CPT | Performed by: EMERGENCY MEDICINE

## 2024-06-10 PROCEDURE — 71260 CT THORAX DX C+: CPT | Performed by: EMERGENCY MEDICINE

## 2024-06-10 PROCEDURE — 84484 ASSAY OF TROPONIN QUANT: CPT | Performed by: EMERGENCY MEDICINE

## 2024-06-10 PROCEDURE — 93010 ELECTROCARDIOGRAM REPORT: CPT

## 2024-06-10 PROCEDURE — 80053 COMPREHEN METABOLIC PANEL: CPT | Performed by: EMERGENCY MEDICINE

## 2024-06-10 PROCEDURE — 71045 X-RAY EXAM CHEST 1 VIEW: CPT | Performed by: EMERGENCY MEDICINE

## 2024-06-10 PROCEDURE — 85379 FIBRIN DEGRADATION QUANT: CPT | Performed by: EMERGENCY MEDICINE

## 2024-06-10 PROCEDURE — 96374 THER/PROPH/DIAG INJ IV PUSH: CPT

## 2024-06-10 PROCEDURE — 99285 EMERGENCY DEPT VISIT HI MDM: CPT

## 2024-06-10 PROCEDURE — 87086 URINE CULTURE/COLONY COUNT: CPT | Performed by: EMERGENCY MEDICINE

## 2024-06-10 PROCEDURE — 81001 URINALYSIS AUTO W/SCOPE: CPT | Performed by: EMERGENCY MEDICINE

## 2024-06-10 PROCEDURE — 99284 EMERGENCY DEPT VISIT MOD MDM: CPT

## 2024-06-10 PROCEDURE — 93005 ELECTROCARDIOGRAM TRACING: CPT

## 2024-06-10 RX ORDER — KETOROLAC TROMETHAMINE 15 MG/ML
15 INJECTION, SOLUTION INTRAMUSCULAR; INTRAVENOUS ONCE
Status: COMPLETED | OUTPATIENT
Start: 2024-06-10 | End: 2024-06-10

## 2024-06-10 NOTE — ED PROVIDER NOTES
Patient Seen in: [facility] Emergency Department      History     Chief Complaint   Patient presents with    Chest Pain     Stated Complaint: chest pain/sob    Subjective:   The history is provided by the patient and medical records.       53 year old female with h/o depression, gerd, TIA who presents with chest pressure x 2 days pointing to her mid-chest just above epigastric area. She took antacid with no improvement, did not feel that it was heartburn. Pt feeling sob, dizzy, HA. No n/v/d. Denies pain with deep breathing.  Pt also reports that she had recent emergency surgery in April of this year while she was in [place], she had appendectomy and R oophorectomy. She recovered, but still feels like she cannot hold her urine after that surgery, has urinary frequency. She recently traveled to [place] last week. No h/o blood clot.     Objective:   Past Medical History:    Depression    Esophageal reflux              Past Surgical History:   Procedure Laterality Date    Appendectomy  02/22/2024    Cholecystectomy  01/01/1991    Hysterectomy  01/01/2013    partial hysterectomy, has ovaries    Removal of ovary(s) Right 02/22/2024    RT ovary    Tubal ligation  18 years ago                Social History     Socioeconomic History    Marital status:    Tobacco Use    Smoking status: Former     Types: Cigarettes    Smokeless tobacco: Former     Quit date: 12/29/2013   Substance and Sexual Activity    Alcohol use: Yes     Alcohol/week: 0.0 - 1.0 standard drinks of alcohol    Drug use: No     Social Determinants of Health     Financial Resource Strain: Low Risk  (8/12/2023)    Received from [facility], [facility]    Financial Resource Strain     In the past year, have you or any family members you live with been unable to get any of the following when it was really needed? Check all that apply.: None   Food Insecurity: Not At Risk (8/12/2023)    Received from [facility],  [facility]    Food Insecurity     RETIRE How often in the past 12 months would you say you are worried or stressed about having enough money to buy nutritious meals? : Never   Transportation Needs: No Transportation Needs (8/12/2023)    [facility]    PRAPARE - Transportation     In the past 12 months, has lack of transportation kept you from medical appointments or from getting medications?: No     In the past 12 months, has lack of transportation kept you from meetings, work, or from getting things needed for daily living?: No   Social Connections: Low Risk  (8/12/2023)    [facility], [facility]    Social Connections     How often do you see or talk to people that you care about and feel close to? (For example: talking to friends on the phone, visiting friends or family, going to Spiritism or club meetings): 5 or more times a week              Review of Systems    Positive for stated complaint: chest pain/sob  Other systems are as noted in HPI.  Constitutional and vital signs reviewed.      All other systems reviewed and negative except as noted above.    Physical Exam     ED Triage Vitals [06/10/24 0044]   /81   Pulse 77   Resp 20   Temp 97.9 °F (36.6 °C)   Temp src    SpO2 97 %   O2 Device None (Room air)       Current Vitals:   Vital Signs  BP: 138/85  Pulse: 73  Resp: 15  Temp: 97.9 °F (36.6 °C)  MAP (mmHg): (!) 101    Oxygen Therapy  SpO2: 99 %  O2 Device: None (Room air)            Physical Exam  Vitals and nursing note reviewed.   Constitutional:       General: She is not in acute distress.     Appearance: Normal appearance. She is well-developed. She is not ill-appearing, toxic-appearing or diaphoretic.   HENT:      Head: Normocephalic and atraumatic.   Eyes:      Conjunctiva/sclera: Conjunctivae normal.      Pupils: Pupils are equal, round, and reactive to  light.   Cardiovascular:      Rate and Rhythm: Normal rate and regular rhythm.      Pulses: Normal pulses.      Heart sounds: Normal heart sounds. No murmur heard.  Pulmonary:      Effort: Pulmonary effort is normal. No respiratory distress.      Breath sounds: Normal breath sounds. No wheezing.   Chest:      Chest wall: Tenderness (R bro-sternal ttp) present.       Abdominal:      General: There is no distension.      Palpations: Abdomen is soft.      Tenderness: There is no abdominal tenderness. There is no guarding.   Musculoskeletal:         General: No tenderness. Normal range of motion.      Cervical back: Full passive range of motion without pain, normal range of motion and neck supple. No rigidity. Normal range of motion.      Right lower leg: No edema.      Left lower leg: No edema.   Skin:     General: Skin is warm and dry.      Findings: No rash.   Neurological:      Mental Status: She is alert and oriented to person, place, and time.      GCS: GCS eye subscore is 4. GCS verbal subscore is 5. GCS motor subscore is 6.      Sensory: Sensation is intact. No sensory deficit.      Motor: Motor function is intact. No weakness.   Psychiatric:         Attention and Perception: Attention normal.         Mood and Affect: Mood normal.         Behavior: Behavior normal. Behavior is cooperative.         ED Course     Labs Reviewed   COMP METABOLIC PANEL (14) - Abnormal; Notable for the following components:       Result Value    Glucose 146 (*)     ALT 82 (*)     AST 63 (*)     All other components within normal limits   URINALYSIS WITH CULTURE REFLEX - Abnormal; Notable for the following components:    Blood Urine 2+ (*)     Leukocyte Esterase Urine 25 (*)     RBC Urine 6-10 (*)     Squamous Epi. Cells Few (*)     All other components within normal limits   D-DIMER - Abnormal; Notable for the following components:    D-Dimer 0.61 (*)     All other components within normal limits   CBC W/ DIFFERENTIAL - Abnormal;  Notable for the following components:    Lymphocyte Absolute 4.02 (*)     Monocyte Absolute 1.40 (*)     All other components within normal limits   TROPONIN I HIGH SENSITIVITY - Normal   LIPASE - Normal   CBC WITH DIFFERENTIAL WITH PLATELET    Narrative:     The following orders were created for panel order CBC With Differential With Platelet.  Procedure                               Abnormality         Status                     ---------                               -----------         ------                     CBC W/ DIFFERENTIAL[[id number]]          Abnormal            Final result                 Please view results for these tests on the individual orders.   URINE CULTURE, ROUTINE     EKG    Rate, intervals and axes as noted on EKG Report.  Rate: 79  Rhythm: Sinus Rhythm  Reading: NSR                  MDM      Pulse Ox: 95%, Normal, RA    Cardiac Monitor:   Pulse Readings from Last 1 Encounters:   06/10/24 73   , sinus, normal for rate and rhythm         Radiology findings:     ED Course as of 06/10/24 0716  ------------------------------------------------------------  Time: 06/10 0508  Value: CT CHEST PE AORTA (IV ONLY) (CPT=71260)  Comment: CTA CHEST (with IV contrast)    IMPRESSION:   Diagnostic evaluation of pulmonary arteries.    No identified pulmonary embolism.    No thoracic aortic aneurysm or dissection.     No pulmonary airspace consolidation.  Minimal dependent groundglass densities are likely atelectatic.  No pleural effusion or pneumothorax.    ------------------------------------------------------------  Time: 06/10 0524  Value: EKG 12 Lead  Comment: EKG    Rate, intervals and axes as noted on EKG Report.  Rate: 79  Rhythm: Sinus Rhythm  Reading: NSR                 Medications   ketorolac (Toradol) 15 MG/ML injection 15 mg (15 mg Intravenous Given 6/10/24 0200)   iopamidol 76% (ISOVUE-370) injection for power injector (66 mL Intravenous Given 6/10/24 0422)     Pt with R side CP, reproducible  on exam, recent travel. D-dimer elevated, subsequent CT Chest with no PE or other acute concerning findings. Normal trop, low risk HEART score. Pt feeling better after toradol, will f/u with PCP, return precautions also discussed.       Disposition and Plan     Clinical Impression:  1. Chest pain of uncertain etiology    2. Elevated LFTs         Disposition:  Discharge  6/10/2024  5:18 am    Follow-up:  Liv Traore MD  [address] [place] [id number]  [phone]    Follow up            Medications Prescribed:  Current Discharge Medication List

## 2024-06-10 NOTE — ED INITIAL ASSESSMENT (HPI)
S: chest pain, sob since yesterday with chills, +dizziness. Chest pain is more epigastric.    patient

## 2024-07-19 ENCOUNTER — OFFICE VISIT (OUTPATIENT)
Dept: FAMILY MEDICINE CLINIC | Facility: CLINIC | Age: 54
End: 2024-07-19
Payer: MEDICAID

## 2024-07-19 VITALS
DIASTOLIC BLOOD PRESSURE: 74 MMHG | HEART RATE: 71 BPM | WEIGHT: 206 LBS | BODY MASS INDEX: 34.32 KG/M2 | HEIGHT: 65 IN | SYSTOLIC BLOOD PRESSURE: 113 MMHG

## 2024-07-19 DIAGNOSIS — G43.919 INTRACTABLE MIGRAINE WITHOUT STATUS MIGRAINOSUS, UNSPECIFIED MIGRAINE TYPE: ICD-10-CM

## 2024-07-19 DIAGNOSIS — G45.9 TIA (TRANSIENT ISCHEMIC ATTACK): ICD-10-CM

## 2024-07-19 DIAGNOSIS — M79.631 RIGHT FOREARM PAIN: ICD-10-CM

## 2024-07-19 DIAGNOSIS — M25.531 RIGHT WRIST PAIN: ICD-10-CM

## 2024-07-19 DIAGNOSIS — M79.641 RIGHT HAND PAIN: Primary | ICD-10-CM

## 2024-07-19 PROCEDURE — 99214 OFFICE O/P EST MOD 30 MIN: CPT | Performed by: NURSE PRACTITIONER

## 2024-07-19 RX ORDER — SUMATRIPTAN 50 MG/1
50 TABLET, FILM COATED ORAL EVERY 2 HOUR PRN
Qty: 9 TABLET | Refills: 1 | Status: SHIPPED | OUTPATIENT
Start: 2024-07-19

## 2024-07-19 RX ORDER — NAPROXEN 500 MG/1
500 TABLET ORAL 2 TIMES DAILY PRN
Qty: 60 TABLET | Refills: 0 | Status: SHIPPED | OUTPATIENT
Start: 2024-07-19

## 2024-07-19 NOTE — PROGRESS NOTES
HPI    Patient presents for right hand pain that has been present for the past 2 years, worsening for the past month.  Radiates and ends in the elbow.  With loss of strength.  Painful all the time.  No alleviating factors.  Takes tylenol for pain which does not help.  Dropping things frequently.  Has worn otc braces without relief.  Also with concerns of headaches.  Unilateral on the left side.  With some left side eyelid and facial twitching for the past few days.  With associated light sensitivity with headaches.  States family has told her over the past week she has been not seeming to be making sense of things in conversations or comprehending things .  No known history of migraines.  Was seeing neuro for TIAs previously but has not followed up in some time.      Review of Systems   Musculoskeletal:         Right hand pain.     Neurological:  Positive for headaches.        Vitals:    07/19/24 0736   BP: 113/74   Pulse: 71   Weight: 206 lb (93.4 kg)   Height: 5' 5\" (1.651 m)     Body mass index is 34.28 kg/m².    Health Maintenance   Topic Date Due    Annual Physical  Never done    Colorectal Cancer Screening  Never done    Zoster Vaccines (1 of 2) Never done    Mammogram  10/18/2024    DTaP,Tdap,and Td Vaccines (2 - Td or Tdap) 08/02/2024 (Originally 9/30/2021)    Influenza Vaccine (1) 10/01/2024    Annual Depression Screening  Completed    Pneumococcal Vaccine: Birth to 64yrs  Aged Out       No LMP recorded. Patient has had a hysterectomy.    Past Medical History:    Depression    Esophageal reflux       .  Past Surgical History:   Procedure Laterality Date    Appendectomy  02/22/2024    Cholecystectomy  01/01/1991    Hysterectomy  01/01/2013    partial hysterectomy, has ovaries    Removal of ovary(s) Right 02/22/2024    RT ovary    Tubal ligation  18 years ago       Family History   Problem Relation Age of Onset    Hypertension Mother     Diabetes Father     Prostate Cancer Father 65    Cancer Paternal  Grandmother        Social History     Socioeconomic History    Marital status:      Spouse name: Not on file    Number of children: Not on file    Years of education: Not on file    Highest education level: Not on file   Occupational History    Not on file   Tobacco Use    Smoking status: Former     Types: Cigarettes    Smokeless tobacco: Former     Quit date: 12/29/2013   Substance and Sexual Activity    Alcohol use: Yes     Alcohol/week: 0.0 - 1.0 standard drinks of alcohol    Drug use: No    Sexual activity: Not on file   Other Topics Concern    Not on file   Social History Narrative    Not on file     Social Determinants of Health     Financial Resource Strain: Low Risk  (8/12/2023)    Received from [facility], [facility]    Financial Resource Strain     In the past year, have you or any family members you live with been unable to get any of the following when it was really needed? Check all that apply.: None   Food Insecurity: Not At Risk (8/12/2023)    Received from [facility], [facility]    Food Insecurity     RETIRE How often in the past 12 months would you say you are worried or stressed about having enough money to buy nutritious meals? : Never   Transportation Needs: No Transportation Needs (8/12/2023)    Received from [facility], [facility], [facility], [facility]    PRAPARE - Transportation     In the past 12 months, has lack of transportation kept you from medical appointments or from getting medications?: No     In the past 12 months, has lack of transportation kept you from meetings, work, or from getting things needed for daily living?: No   Physical Activity: Not on file   Stress: Not on file   Social Connections: Low Risk  (8/12/2023)    Received from [facility], [facility]    Social Connections     How often do you see or talk to people that you care about and feel close  to? (For example: talking to friends on the phone, visiting friends or family, going to [employer] or club meetings): 5 or more times a week   Housing Stability: Not on file       Current Outpatient Medications   Medication Sig Dispense Refill    naproxen 500 MG Oral Tab Take 1 tablet (500 mg total) by mouth 2 (two) times daily as needed (take with food). 60 tablet 0    SUMAtriptan (IMITREX) 50 MG Oral Tab Take 1 tablet (50 mg total) by mouth every 2 (two) hours as needed for Migraine. Use at onset; repeat once after 2 HRS-ONLY 2 IN 24 HR MAX 9 tablet 1    aspirin 81 MG Oral Tab EC Take 1 tablet (81 mg total) by mouth daily.         Allergies:  Allergies   Allergen Reactions    Lexapro [Escitalopram]        Physical Exam  Vitals and nursing note reviewed.   Constitutional:       General: She is not in acute distress.     Appearance: Normal appearance.   Cardiovascular:      Rate and Rhythm: Normal rate and regular rhythm.      Heart sounds: Normal heart sounds. No murmur heard.  Pulmonary:      Effort: Pulmonary effort is normal. No respiratory distress.      Breath sounds: Normal breath sounds. No stridor. No wheezing, rhonchi or rales.   Chest:      Chest wall: No tenderness.   Musculoskeletal:      Right forearm: Tenderness present.      Right wrist: Tenderness present. Decreased range of motion.      Right hand: Swelling and tenderness present. Decreased range of motion.   Neurological:      Mental Status: She is alert and oriented to person, place, and time.          Assessment and Plan:  Problem List Items Addressed This Visit    None  Visit Diagnoses       Right hand pain    -  Primary    Relevant Medications    naproxen 500 MG Oral Tab    Other Relevant Orders    EMG (At [facility])    ORTHOPEDIC - INTERNAL    Right forearm pain        Relevant Medications    naproxen 500 MG Oral Tab    Other Relevant Orders    EMG (At [facility])    ORTHOPEDIC - INTERNAL    Right wrist  pain        Relevant Medications    naproxen 500 MG Oral Tab    Other Relevant Orders    EMG (At [facility])    ORTHOPEDIC - INTERNAL    TIA (transient ischemic attack)        Relevant Orders    Neuro Referral - In Network    Intractable migraine without status migrainosus, unspecified migraine type        Relevant Medications    naproxen 500 MG Oral Tab    SUMAtriptan (IMITREX) 50 MG Oral Tab    Other Relevant Orders    Neuro Referral - In Network           Patient to follow up for EMG.  Referral to ortho for assessment after completion of EMG.  Naproxen bid prn for migraines.  Imitrex as needed if no improvement with naproxen.  Referral to follow up with neuro for assessment of migraines and follow up of TIAs.       Discussed plan of care with patient and patient is in agreement.  All questions answered. Patient to call with questions or concerns.    Encouraged to sign up for My Chart if not already registered.

## 2024-08-26 ENCOUNTER — PROCEDURE VISIT (OUTPATIENT)
Dept: PHYSICAL MEDICINE AND REHAB | Facility: CLINIC | Age: 54
End: 2024-08-26
Payer: MEDICAID

## 2024-08-26 DIAGNOSIS — M79.641 RIGHT HAND PAIN: ICD-10-CM

## 2024-08-26 DIAGNOSIS — M25.531 RIGHT WRIST PAIN: ICD-10-CM

## 2024-08-26 DIAGNOSIS — M79.631 RIGHT FOREARM PAIN: ICD-10-CM

## 2024-08-26 NOTE — PROCEDURES
[address]  [address] [place] [id number]  Phone: [phone]  Fax: [phone]    ELECTRODIAGNOSTIC REPORT          Patient: Lissette Swanson Hand Dominance:  Right    Patient ID: [id number] Referring Dr:  PHILLIP Plummer   Sex: Female Test Dr:  Edwards D.O   YOB: 1970           Visit Date: 8/26/2024 Examining MD:     Age: 54 Years Referred by:     Height: 5 feet 5 inch     Referred for:      Hand paresthesias           Summary    The motor conduction test was normal in all 2 of the tested nerves: R Median - APB, R Ulnar - ADM.    The sensory conduction test was normal in all 3 of the tested nerves: R Median - Digit II (Antidromic), R Ulnar - Digit V (Antidromic), R Radial - Anatomical snuff box (Forearm).          Conclusion:       This is a normal study.    1.  There is no evidence to support a right-sided median ulnar mononeuropathy.  2.  There is no evidence to support a right-sided cervical (C5-T1) radiculopathy.        Desmond Sotelo DO  Interventional Spine and Sports Medicine Specialist   Physical Medicine and Rehabilitation  [address] [address]    [address] [place] [address]      ________________________________               Motor NCS      Nerve / Sites Muscle Latency Amplitude Amp % Duration Segments Distance Lat Diff Velocity     ms mV % ms  cm ms m/s   R Median - APB      Wrist APB 2.63 16.2 100 4.60 Wrist - APB 8        Ref.  ?4.40 ?4.0 ?100  Ref.         Elbow APB 6.40 15.6 96.4 4.73 Elbow - Wrist 21 3.77 56      Ref.    ?80  Ref.   ?49   R Ulnar - ADM      Wrist ADM 3.25 8.6 100 6.02 Wrist - ADM 8        Ref.  ?3.90 ?5.0 ?100  Ref.         B.Elbow ADM 5.65 8.7 101 5.92 B.Elbow - Wrist 13 2.40 54      Ref.    ?80  Ref.   ?50      A.Elbow ADM 7.52 8.7 101 6.15 A.Elbow - B.Elbow 10.5 1.88 56      Ref.      Ref.   ?50        Sensory NCS      Nerve / Sites Rec. Site Onset Lat Peak Lat NP Amp PP Amp Segments Distance Velocity     ms ms µV µV  cm m/s   R Median - Digit II (Antidromic)      Wrist Dig II 2.21 2.79 33.4 52.4 Wrist - Dig II 13 59      Ref.   ?3.40 ?15.0 ?20.0 Ref.     R Ulnar - Digit V (Antidromic)      Wrist Dig V 2.33 2.92 20.8 37.7 Wrist - Dig V 14 60      Ref.   ?3.70 ?15.0 ?15.0 Ref.     R Radial - Anatomical snuff box (Forearm)      Forearm Wrist 1.73 2.23 18.3 20.2 Forearm - Wrist 10 58      Ref.   ?2.70 ?5.0 ?5.0 Ref.

## 2024-09-04 ENCOUNTER — OFFICE VISIT (OUTPATIENT)
Dept: ORTHOPEDICS CLINIC | Facility: CLINIC | Age: 54
End: 2024-09-04
Payer: MEDICAID

## 2024-09-04 VITALS — HEIGHT: 65 IN | WEIGHT: 206 LBS | BODY MASS INDEX: 34.32 KG/M2

## 2024-09-04 DIAGNOSIS — G56.01 CARPAL TUNNEL SYNDROME OF RIGHT WRIST: Primary | ICD-10-CM

## 2024-09-04 PROCEDURE — 99204 OFFICE O/P NEW MOD 45 MIN: CPT | Performed by: ORTHOPAEDIC SURGERY

## 2024-09-04 PROCEDURE — 20526 THER INJECTION CARP TUNNEL: CPT | Performed by: ORTHOPAEDIC SURGERY

## 2024-09-04 RX ORDER — BETAMETHASONE SODIUM PHOSPHATE AND BETAMETHASONE ACETATE 3; 3 MG/ML; MG/ML
6 INJECTION, SUSPENSION INTRA-ARTICULAR; INTRALESIONAL; INTRAMUSCULAR; SOFT TISSUE ONCE
Status: COMPLETED | OUTPATIENT
Start: 2024-09-04 | End: 2024-09-04

## 2024-09-04 RX ADMIN — BETAMETHASONE SODIUM PHOSPHATE AND BETAMETHASONE ACETATE 6 MG: 3; 3 INJECTION, SUSPENSION INTRA-ARTICULAR; INTRALESIONAL; INTRAMUSCULAR; SOFT TISSUE at 11:38:00

## 2024-09-04 NOTE — H&P
Clinic Note      Assessment/Plan:  54 year old female    Right carpal tunnel syndrome -EMG/NCV negative-based on symptomatology and clinical exam findings patient may have EMG/NCV negative carpal tunnel syndrome or median neuritis.  We will proceed with a therapeutic/diagnostic corticosteroid injection to the carpal tunnel today.  If her symptoms recur a carpal tunnel release may be beneficial.  Right thumb CMC arthritis-discussed nonsurgical treatment options which include turmeric/curcumin, CBD oil, Voltaren gel 1%, bracing, NSAIDs, and CMC bracing.    Follow Up: As needed    Injection:    Written consent was obtained.  The skin was prepped with alcohol.  Ethyl chloride spray was used anesthetize the superficial skin.  A 25-gauge needle was used to inject 1.5 mL mixture of 1 mL of 6 mg of betamethasone and 1 mL of 1% lidocaine into right carpal tunnel.  Hemostasis achieved.  Band-Aid was applied.  Patient tolerated procedure without complication.    Diagnostic Studies:     EMG/NCV: Negative for peripheral neuropathy     Physical Exam:    Ht 5' 5\" (1.651 m)   Wt 206 lb (93.4 kg)   BMI 34.28 kg/m²     Constitutional: NAD. AOx3. Well-developed and Well-nourished.   Psychiatric: Normal mood/ affect/ behavior. Judgment and thought content normal.     Right Upper Extremity:   Inspection: Skin Intact. No skin lesions. No gross deformity.   Palpation: Tenderness palpation of the CMC joint, and volar distal forearm   Motion: Elbow: normal bilateral symmetric ext/flex  Wrist: normal bilateral symmetric ext/flex/sup/pro  Finger: full composite fist   Vascular 2+ radial pulse       Median Nerve Exam Right    Phdurkan +   Sensation normal   PCBr Sensation normal   APB Weakness No   Thenar Atrophy No     Ulnar Nerve Exam Right    Sensation normal   1st ELI Weakness No   Hypothenar Atrophy No     Radial Nerve Exam  Right    Radial Sensory normal       CC: Right hand pain    HPI: This 54 year old RHD female presents with  pain in the right distal forearm.  Patient reports moderate to severe pain.  She characterizes the pain as throbbing aching sharp shooting and burning in quality.  Does go into the median nerve distribution.  When she lifts heavy things she feels like she loses strength.  She also reports pain at the base of the thumb joint.  She has tried bracing.  Her symptoms do wake her up at nighttime.      Past Medical History:    Depression    Esophageal reflux     Past Surgical History:   Procedure Laterality Date    Appendectomy  02/22/2024    Cholecystectomy  01/01/1991    Hysterectomy  01/01/2013    partial hysterectomy, has ovaries    Removal of ovary(s) Right 02/22/2024    RT ovary    Tubal ligation  18 years ago     Current Outpatient Medications   Medication Sig Dispense Refill    naproxen 500 MG Oral Tab Take 1 tablet (500 mg total) by mouth 2 (two) times daily as needed (take with food). 60 tablet 0    SUMAtriptan (IMITREX) 50 MG Oral Tab Take 1 tablet (50 mg total) by mouth every 2 (two) hours as needed for Migraine. Use at onset; repeat once after 2 HRS-ONLY 2 IN 24 HR MAX 9 tablet 1    aspirin 81 MG Oral Tab EC Take 1 tablet (81 mg total) by mouth daily.       Allergies   Allergen Reactions    Lexapro [Escitalopram]      Family History   Problem Relation Age of Onset    Hypertension Mother     Diabetes Father     Prostate Cancer Father 65    Cancer Paternal Grandmother      Social History     Occupational History    Not on file   Tobacco Use    Smoking status: Former     Types: Cigarettes    Smokeless tobacco: Former     Quit date: 12/29/2013   Substance and Sexual Activity    Alcohol use: Yes     Alcohol/week: 0.0 - 1.0 standard drinks of alcohol    Drug use: No    Sexual activity: Not on file        Review of Systems (negative unless bolded):  General: fevers, chills, fatigue  CV:  chest pain, palpitations, leg swelling  Msk: bodyaches, neck pain, neck stiffness  Skin: rashes, open wounds, nonhealing  ulcers  Hem: bleeds easily, bruise easily, immunocompromised  Neuro: dizziness, light headedness, headaches  Psych: anxious, depressed, anger issues    Jose Campo MD   Hand, Wrist, & Elbow Surgery  [email or web address]  t: [phone]  f: [phone]

## 2024-09-05 ENCOUNTER — OFFICE VISIT (OUTPATIENT)
Dept: NEUROLOGY | Facility: CLINIC | Age: 54
End: 2024-09-05
Payer: MEDICAID

## 2024-09-05 DIAGNOSIS — R41.89 BRAIN FOG: ICD-10-CM

## 2024-09-05 DIAGNOSIS — R29.818 FUNCTIONAL NEUROLOGIC COMPLAINT: ICD-10-CM

## 2024-09-05 DIAGNOSIS — G43.009 MIGRAINE WITHOUT AURA AND WITHOUT STATUS MIGRAINOSUS, NOT INTRACTABLE: ICD-10-CM

## 2024-09-05 DIAGNOSIS — G45.9 TIA (TRANSIENT ISCHEMIC ATTACK): Primary | ICD-10-CM

## 2024-09-05 DIAGNOSIS — G31.84 MCI (MILD COGNITIVE IMPAIRMENT) WITH MEMORY LOSS: ICD-10-CM

## 2024-09-05 DIAGNOSIS — G47.10 HYPERSOMNIA: ICD-10-CM

## 2024-09-05 PROCEDURE — 99214 OFFICE O/P EST MOD 30 MIN: CPT | Performed by: OTHER

## 2024-09-05 RX ORDER — DIAZEPAM 10 MG
TABLET ORAL
Qty: 2 TABLET | Refills: 0 | Status: SHIPPED | OUTPATIENT
Start: 2024-09-05

## 2024-09-05 NOTE — PROGRESS NOTES
Neurology follow-up visit     Referred By: Dr. Bundy ref. provider found    Chief Complaint:   Chief Complaint   Patient presents with    Neurologic Problem     LOV 9/2023 For TIA. The patient presents here today follow up for TIA. Patient c/o migraines/headaches 6-8 per month with dizziness, nausea. Current medications ASA 81MG, SUMAtriptan (IMITREX) 50 MG .       HPI:     Lissette Block is a 54 year old female, who presents for multiple symptoms.  Apparently in August 2023 patient developed a sensation of numbness in the left side of the face, fatigue, brain fog, difficulty concentrating, balance problems, light sensitivity.  Patient was seen before that started on topiramate and she took it for about 1 or 2 weeks before the symptoms started occurring at the higher dose.  She stopped topiramate, she went to the emergency room, she did have MRI of the brain and CT of the head done and those were not revealing.  Patient was placed on aspirin.  It seems that she was told it was a possible TIA but no other work-up was done at that time.  Patient came to see me first time in September 2023 with continued symptoms of fogginess, balance problems, constriction difficulties, light sensitivity.  Patient did have history of migraines some years ago but not recently.    Dopplers of carotid arteries was normal.  Echocardiogram and Holter monitor were not revealing either.  Patient was continued with aspirin.  Lipid panel was not done.  B12, TSH and RPR were also checked to look for any other causes of cognitive changes.  Those were not revealing.  Cognitive/speech therapy was ordered as well as physical therapy was ordered.    Patient came back for follow-up in September 2024.  Continued with headaches, twice to 3 times a week, usually next left-sided, severe, pounding, associated with noise sensitivity, nausea, dizziness.  It would last for the whole day, she is not able to function when she has such severe  headaches.  Also waking up frequently at night, snoring a lot.  Feeling tired and not able to concentrate and not able to work.      Past Medical History:    Depression    Esophageal reflux       Past Surgical History:   Procedure Laterality Date    Appendectomy  02/22/2024    Cholecystectomy  01/01/1991    Hysterectomy  01/01/2013    partial hysterectomy, has ovaries    Removal of ovary(s) Right 02/22/2024    RT ovary    Tubal ligation  18 years ago       Social history:  History   Smoking Status    Former    Types: Cigarettes   Smokeless Tobacco    Former    Quit date: 12/29/2013     History   Alcohol Use    0.0 - 1.0 standard drinks of alcohol/week     History   Drug Use No       Family History   Problem Relation Age of Onset    Hypertension Mother     Diabetes Father     Prostate Cancer Father 65    Cancer Paternal Grandmother          Current Outpatient Medications:     aspirin 81 MG Oral Tab EC, Take 1 tablet (81 mg total) by mouth daily., Disp: , Rfl:     naproxen 500 MG Oral Tab, Take 1 tablet (500 mg total) by mouth 2 (two) times daily as needed (take with food). (Patient not taking: Reported on 9/5/2024), Disp: 60 tablet, Rfl: 0    SUMAtriptan (IMITREX) 50 MG Oral Tab, Take 1 tablet (50 mg total) by mouth every 2 (two) hours as needed for Migraine. Use at onset; repeat once after 2 HRS-ONLY 2 IN 24 HR MAX (Patient not taking: Reported on 9/5/2024), Disp: 9 tablet, Rfl: 1    Allergies   Allergen Reactions    Lexapro [Escitalopram]        ROS:   As in HPI, the rest of the 14 system review was done and was negative      Physical Exam:  There were no vitals filed for this visit.      General: No apparent distress, well nourished, well groomed.  Head- Normocephalic, atraumatic  Eyes- No redness or swelling  ENT- Hearing intake, normal glutition  Neck- No masses or adenopathy  Cv: pulses were palpable and normal, no cyanosis or edema     Neurological:     Mental Status- Alert and oriented x3.  Normal attention  span and concentration  Thought process intact  Memory intact- recent and remote  Mood intact  Fund of knowledge appropriate for education and age    Language intact including: comprehension, naming, repetition, vocabulary    Cranial Nerves:  II.- Visual fields full to confrontation    III, IV, VI- EOM intact, STEPHEN  V. Facial sensation intact  VII. Face symmetric, no facial weakness  VIII. Hearing intact to whisper.  IX. Pallet elevates symmetrically.  XI. Shoulder shrug is intact  XII. Tongue is midline    Motor Exam:  Muscle tone normal  No atrophy or fasciculations  Strength- upper extremities 5/5 proximally and distally                  - lower  extremities 5/5 proximally and distally    Sensory Exam:  Light touch sensation- intact in all 4 extremities    Deep Tendon Reflexes:  Biceps 2+ bilateral symmetric  Triceps 2+ bilateral symmetric  Brachioradialis 2 + bilateral symmetric  Patellar 2+ bilateral symmetric  Ankle jerk 2+ bilateral symmetric    No clonus  No Babinski sign    Coordination:  Finger to nose intact  Rapid alternating movements intact    Gait:  Normal posture  Normal physiologic, even tandem gait was normal.    Labs:    Lab Results   Component Value Date    TSH 2.759 04/08/2024     No results found for: \"CHOL\", \"HDL\", \"LDL\", \"TRIG\"  Lab Results   Component Value Date    HGB 14.4 06/10/2024    HCT 43.6 06/10/2024    MCV 85.8 06/10/2024    WBC 10.4 06/10/2024    .0 06/10/2024      Lab Results   Component Value Date    BUN 9 06/10/2024    CA 9.2 06/10/2024    ALT 82 (H) 06/10/2024    AST 63 (H) 06/10/2024    ALB 4.3 06/10/2024     06/10/2024    K 4.2 06/10/2024     06/10/2024    CO2 23.0 06/10/2024      I have reviewed labs.  I have reviewed the report of the MRI and CT as above it was essentially normal.    Assessment   1. TIA (transient ischemic attack)  Differential diagnosis includes today but not necessarily the first consideration.  Nonetheless we will continue with  aspirin, the rest of the TIA work-up  Was done and that was not revealing    2. Functional neurologic complaint  Possibility of some of the symptoms being result of the side effects of topiramate, it is known to cause some paresthesias and brain fog.  Other possibility includes sleep apnea or chronic migraines playing some role as well.  Sleep study will be done.  Meantime amitriptyline will be started, slowly tapering up the dose to try to prevent headaches and migraines and possibly improve some quality sleep.  MRI will be repeated to rule out any other etiology    3. Brain fog    Neuropsychological testing will be done.         Education and counseling provided to patient. Instructed patient to call my office or seek medical attention immediately if symptoms worsen.  Patient verbalized understanding of information given. All questions were answered. All side effects of drugs were discussed.       Return to clinic in: No follow-ups on file.    Geovanny Cuevas MD

## 2024-09-10 ENCOUNTER — TELEPHONE (OUTPATIENT)
Age: 54
End: 2024-09-10

## 2024-09-10 NOTE — TELEPHONE ENCOUNTER
Hello - I am reaching out from the [facility] Navigation department, following up on an order from your provider's office to assist in connecting you with resources for care. If you would like to discuss this further, please give us a call at [phone], or for more immediate assistance you can contact our 24-hour help line at [phone]. We look forward to hearing from you soon.

## 2024-09-19 ENCOUNTER — OFFICE VISIT (OUTPATIENT)
Dept: SLEEP CENTER | Age: 54
End: 2024-09-19
Attending: Other
Payer: MEDICAID

## 2024-09-19 DIAGNOSIS — G47.10 HYPERSOMNIA: ICD-10-CM

## 2024-09-19 PROCEDURE — 95810 POLYSOM 6/> YRS 4/> PARAM: CPT

## 2024-09-24 ENCOUNTER — TELEPHONE (OUTPATIENT)
Age: 54
End: 2024-09-24

## 2024-09-24 NOTE — TELEPHONE ENCOUNTER
The [facility] Navigation team has attempted to reach you in order to follow up on an order that was placed by Dr. Cuevas's office. Please give us a call back at [phone] to discuss care coordination and resources.

## 2024-10-28 ENCOUNTER — HOSPITAL ENCOUNTER (EMERGENCY)
Facility: HOSPITAL | Age: 54
Discharge: HOME | End: 2024-10-28
Attending: EMERGENCY MEDICINE
Payer: MEDICAID

## 2024-10-28 ENCOUNTER — APPOINTMENT (OUTPATIENT)
Dept: GENERAL RADIOLOGY | Facility: HOSPITAL | Age: 54
End: 2024-10-28
Attending: EMERGENCY MEDICINE
Payer: MEDICAID

## 2024-10-28 VITALS
RESPIRATION RATE: 18 BRPM | TEMPERATURE: 100 F | OXYGEN SATURATION: 95 % | HEART RATE: 84 BPM | DIASTOLIC BLOOD PRESSURE: 67 MMHG | SYSTOLIC BLOOD PRESSURE: 129 MMHG

## 2024-10-28 DIAGNOSIS — R05.2 SUBACUTE COUGH: Primary | ICD-10-CM

## 2024-10-28 DIAGNOSIS — R74.01 TRANSAMINITIS: ICD-10-CM

## 2024-10-28 DIAGNOSIS — M79.10 MYALGIA: ICD-10-CM

## 2024-10-28 LAB
ALBUMIN SERPL-MCNC: 4.1 G/DL (ref 3.2–4.8)
ALP LIVER SERPL-CCNC: 83 U/L
ALT SERPL-CCNC: 71 U/L
ANION GAP SERPL CALC-SCNC: 9 MMOL/L (ref 0–18)
AST SERPL-CCNC: 62 U/L (ref ?–34)
BASOPHILS # BLD AUTO: 0.09 X10(3) UL (ref 0–0.2)
BASOPHILS NFR BLD AUTO: 0.9 %
BILIRUB DIRECT SERPL-MCNC: 0.1 MG/DL (ref ?–0.3)
BILIRUB SERPL-MCNC: 0.4 MG/DL (ref 0.3–1.2)
BUN BLD-MCNC: 13 MG/DL (ref 9–23)
BUN/CREAT SERPL: 14.8 (ref 10–20)
CALCIUM BLD-MCNC: 9.3 MG/DL (ref 8.7–10.4)
CHLORIDE SERPL-SCNC: 108 MMOL/L (ref 98–112)
CO2 SERPL-SCNC: 24 MMOL/L (ref 21–32)
CREAT BLD-MCNC: 0.88 MG/DL
DEPRECATED RDW RBC AUTO: 36.6 FL (ref 35.1–46.3)
EGFRCR SERPLBLD CKD-EPI 2021: 79 ML/MIN/1.73M2 (ref 60–?)
EOSINOPHIL # BLD AUTO: 0.47 X10(3) UL (ref 0–0.7)
EOSINOPHIL NFR BLD AUTO: 4.8 %
ERYTHROCYTE [DISTWIDTH] IN BLOOD BY AUTOMATED COUNT: 12 % (ref 11–15)
GLUCOSE BLD-MCNC: 211 MG/DL (ref 70–99)
HCT VFR BLD AUTO: 39.9 %
HGB BLD-MCNC: 14 G/DL
IMM GRANULOCYTES # BLD AUTO: 0.07 X10(3) UL (ref 0–1)
IMM GRANULOCYTES NFR BLD: 0.7 %
LYMPHOCYTES # BLD AUTO: 3.26 X10(3) UL (ref 1–4)
LYMPHOCYTES NFR BLD AUTO: 33 %
MCH RBC QN AUTO: 29.6 PG (ref 26–34)
MCHC RBC AUTO-ENTMCNC: 35.1 G/DL (ref 31–37)
MCV RBC AUTO: 84.4 FL
MONOCYTES # BLD AUTO: 1.45 X10(3) UL (ref 0.1–1)
MONOCYTES NFR BLD AUTO: 14.7 %
NEUTROPHILS # BLD AUTO: 4.55 X10 (3) UL (ref 1.5–7.7)
NEUTROPHILS # BLD AUTO: 4.55 X10(3) UL (ref 1.5–7.7)
NEUTROPHILS NFR BLD AUTO: 45.9 %
OSMOLALITY SERPL CALC.SUM OF ELEC: 298 MOSM/KG (ref 275–295)
PLATELET # BLD AUTO: 277 10(3)UL (ref 150–450)
POTASSIUM SERPL-SCNC: 3.9 MMOL/L (ref 3.5–5.1)
PROT SERPL-MCNC: 7.8 G/DL (ref 5.7–8.2)
RBC # BLD AUTO: 4.73 X10(6)UL
SODIUM SERPL-SCNC: 141 MMOL/L (ref 136–145)
TROPONIN I SERPL HS-MCNC: <3 NG/L
WBC # BLD AUTO: 9.9 X10(3) UL (ref 4–11)

## 2024-10-28 PROCEDURE — 99285 EMERGENCY DEPT VISIT HI MDM: CPT

## 2024-10-28 PROCEDURE — 84484 ASSAY OF TROPONIN QUANT: CPT | Performed by: EMERGENCY MEDICINE

## 2024-10-28 PROCEDURE — 85025 COMPLETE CBC W/AUTO DIFF WBC: CPT | Performed by: EMERGENCY MEDICINE

## 2024-10-28 PROCEDURE — 71045 X-RAY EXAM CHEST 1 VIEW: CPT | Performed by: EMERGENCY MEDICINE

## 2024-10-28 PROCEDURE — 93010 ELECTROCARDIOGRAM REPORT: CPT

## 2024-10-28 PROCEDURE — 96374 THER/PROPH/DIAG INJ IV PUSH: CPT

## 2024-10-28 PROCEDURE — 80048 BASIC METABOLIC PNL TOTAL CA: CPT | Performed by: EMERGENCY MEDICINE

## 2024-10-28 PROCEDURE — 94640 AIRWAY INHALATION TREATMENT: CPT

## 2024-10-28 PROCEDURE — 80076 HEPATIC FUNCTION PANEL: CPT | Performed by: EMERGENCY MEDICINE

## 2024-10-28 PROCEDURE — 93005 ELECTROCARDIOGRAM TRACING: CPT

## 2024-10-28 RX ORDER — CODEINE PHOSPHATE AND GUAIFENESIN 10; 100 MG/5ML; MG/5ML
5 SOLUTION ORAL EVERY 6 HOURS PRN
Qty: 80 ML | Refills: 0 | Status: SHIPPED | OUTPATIENT
Start: 2024-10-28 | End: 2024-11-01

## 2024-10-28 RX ORDER — KETOROLAC TROMETHAMINE 15 MG/ML
15 INJECTION, SOLUTION INTRAMUSCULAR; INTRAVENOUS ONCE
Status: COMPLETED | OUTPATIENT
Start: 2024-10-28 | End: 2024-10-28

## 2024-10-28 RX ORDER — BENZONATATE 100 MG/1
100 CAPSULE ORAL 3 TIMES DAILY PRN
Qty: 30 CAPSULE | Refills: 0 | Status: SHIPPED | OUTPATIENT
Start: 2024-10-28 | End: 2024-11-27

## 2024-10-28 RX ORDER — ALBUTEROL SULFATE 90 UG/1
2 INHALANT RESPIRATORY (INHALATION) EVERY 4 HOURS PRN
Qty: 1 EACH | Refills: 0 | Status: SHIPPED | OUTPATIENT
Start: 2024-10-28 | End: 2024-11-27

## 2024-10-28 RX ORDER — AZITHROMYCIN 250 MG/1
TABLET, FILM COATED ORAL
Qty: 6 TABLET | Refills: 0 | Status: SHIPPED | OUTPATIENT
Start: 2024-10-28 | End: 2024-11-02

## 2024-10-28 RX ORDER — IPRATROPIUM BROMIDE AND ALBUTEROL SULFATE 2.5; .5 MG/3ML; MG/3ML
3 SOLUTION RESPIRATORY (INHALATION) ONCE
Status: COMPLETED | OUTPATIENT
Start: 2024-10-28 | End: 2024-10-28

## 2024-10-28 NOTE — ED INITIAL ASSESSMENT (HPI)
Patient presents to ED with cough and congestion x2 weeks. Patient reports her symptoms have worsened and she now has a sore throat and L ear pain. Patient states she is also having CP

## 2024-10-28 NOTE — ED PROVIDER NOTES
Patient Seen in: [facility] Emergency Department      History     Chief Complaint   Patient presents with    Cough/URI     Stated Complaint: cough, congesrtion x2wks    Subjective:   Patient who states she has no real medical problems he takes an aspirin a day he has been sick for 2 weeks with cough, congestion, headaches, myalgias and sore throats.  She has not had a fever for over a week.  Denies loss of taste or smell.  Few days back she did have an emesis episode but no recent emesis or diarrhea.  No urinary symptoms.  No shortness of breath but she does feel like there is a pressure on her chest for 6 days straight and when she coughs it causes the chest to hurt more.  No leg swelling.  No focal weakness or numbness.  No rash.  No travel or trauma or exposure.  Has not really taken a COVID test.  Also has some left ear pressure              Objective:     Past Medical History:    Depression    Esophageal reflux              Past Surgical History:   Procedure Laterality Date    Appendectomy  02/22/2024    Cholecystectomy  01/01/1991    Hysterectomy  01/01/2013    partial hysterectomy, has ovaries    Removal of ovary(s) Right 02/22/2024    RT ovary    Tubal ligation  18 years ago                Social History     Socioeconomic History    Marital status:    Tobacco Use    Smoking status: Former     Types: Cigarettes    Smokeless tobacco: Former     Quit date: 12/29/2013   Substance and Sexual Activity    Alcohol use: Yes     Alcohol/week: 0.0 - 1.0 standard drinks of alcohol    Drug use: No     Social Drivers of Health     Financial Resource Strain: Low Risk  (8/12/2023)    Received from [facility], [facility]    Financial Resource Strain     In the past year, have you or any family members you live with been unable to get any of the following when it was really needed? Check all that apply.: None   Food Insecurity: Not At Risk (8/12/2023)    Received from [facility], [facility]    Food Insecurity     RETIRE How often in the past 12 months would you say you are worried or stressed about having enough money to buy nutritious meals? : Never   Transportation Needs: No Transportation Needs (8/12/2023)    Received from [facility], [facility], [facility], [facility]    PRAPARE - Transportation     In the past 12 months, has lack of transportation kept you from medical appointments or from getting medications?: No     In the past 12 months, has lack of transportation kept you from meetings, work, or from getting things needed for daily living?: No   Social Connections: Low Risk  (8/12/2023)    Received from [facility], [facility]    Social Connections     How often do you see or talk to people that you care about and feel close to? (For example: talking to friends on the phone, visiting friends or family, going to [employer] or club meetings): 5 or more times a week                  Physical Exam     ED Triage Vitals [10/28/24 1713]   BP (!) 143/91   Pulse 76   Resp 18   Temp 98.3 °F (36.8 °C)   Temp src Temporal   SpO2 96 %   O2 Device None (Room air)       Current Vitals:   Vital Signs  BP: 129/67  Pulse: 84  Resp: 18  Temp: 99.6 °F (37.6 °C)  Temp src: Temporal    Oxygen Therapy  SpO2: 95 %  O2 Device: None (Room air)        Physical Exam  HENT:      Head: Normocephalic.      Nose: Rhinorrhea present.      Mouth/Throat:      Mouth: Mucous membranes are moist.      Pharynx: Oropharynx is clear.   Eyes:      Extraocular Movements: Extraocular movements intact.      Pupils: Pupils are equal, round, and reactive to light.   Cardiovascular:      Rate and Rhythm: Normal rate and regular rhythm.      Heart sounds: Normal heart sounds.   Pulmonary:      Effort: Pulmonary effort is normal.      Comments: A few rhonchi are heard  Abdominal:      Palpations: Abdomen is soft.      Tenderness: There is no  abdominal tenderness.   Musculoskeletal:         General: No swelling or tenderness. Normal range of motion.      Cervical back: Normal range of motion.   Lymphadenopathy:      Cervical: No cervical adenopathy.   Skin:     General: Skin is warm.      Capillary Refill: Capillary refill takes less than 2 seconds.   Neurological:      General: No focal deficit present.      Mental Status: She is alert.             ED Course     Labs Reviewed   CBC WITH DIFFERENTIAL WITH PLATELET - Abnormal; Notable for the following components:       Result Value    Monocyte Absolute 1.45 (*)     All other components within normal limits   HEPATIC FUNCTION PANEL (7) - Abnormal; Notable for the following components:    AST 62 (*)     ALT 71 (*)     All other components within normal limits   BASIC METABOLIC PANEL (8) - Abnormal; Notable for the following components:    Glucose 211 (*)     Calculated Osmolality 298 (*)     All other components within normal limits   TROPONIN I HIGH SENSITIVITY - Normal     EKG    Rate, intervals and axes as noted on EKG Report.  Rate: 80  Rhythm: Sinus Rhythm  Reading: Normal sinus rhythm and rate.  Normal axis and intervals.  Normal ST segments.  This EKG was interpreted by me.  Normal           ED Course as of 10/28/24 2215  ------------------------------------------------------------  Time: 10/28 1956  Comment: Labs independently interpreted by me.  CBC normal, BMP showed a mild hyperglycemia, troponin normal, hepatic profile showed a mild transaminitis.  Chest x-ray was clear this was independently interpreted by me.              MDM      XR CHEST AP PORTABLE  (CPT=71045)    Result Date: 10/28/2024  CONCLUSION:   No focal opacity, pleural effusion, or pneumothorax.    Dictated by (CST): Lazaro Loza MD on 10/28/2024 at 6:54 PM     Finalized by (CST): Lazaro Loza MD on 10/28/2024 at 6:55 PM                 Medical Decision Making  Patient with cough, congestion, myalgias, sore throat, headache  all going on for 2 weeks.  No fever in the week.  Could be pneumonia, mycoplasma, postviral cough, prolonged viral syndrome, ACS and PE less likely.  Does not seem to be consistent with dissection.  EKG was normal.  Pulse ox normal 96.  Afebrile here.    Amount and/or Complexity of Data Reviewed  External Data Reviewed: notes.  Labs: ordered. Decision-making details documented in ED Course.  Radiology: ordered and independent interpretation performed. Decision-making details documented in ED Course.  ECG/medicine tests: ordered and independent interpretation performed. Decision-making details documented in ED Course.  Discussion of management or test interpretation with external provider(s): See ed course, felt better after neb, still coughing,  cxr clear, will prescribe zpack for possible atypical infection.     Risk  Prescription drug management.        Disposition and Plan     Clinical Impression:  1. Subacute cough    2. Myalgia    3. Transaminitis         Disposition:  Discharge  10/28/2024  9:42 pm    Follow-up:  Nonstaff, Physician          Marc Ma MD  [address] [place] [id number]  [phone]    Follow up            Medications Prescribed:  Discharge Medication List as of 10/28/2024  9:48 PM        START taking these medications    Details   azithromycin (ZITHROMAX Z-ES) 250 MG Oral Tab 500 mg once followed by 250 mg daily x 4 days, Normal, Disp-6 tablet, R-0Give spacer      albuterol 108 (90 Base) MCG/ACT Inhalation Aero Soln Inhale 2 puffs into the lungs every 4 (four) hours as needed for Wheezing., Normal, Disp-1 each, R-0      benzonatate 100 MG Oral Cap Take 1 capsule (100 mg total) by mouth 3 (three) times daily as needed for cough., Normal, Disp-30 capsule, R-0      guaiFENesin-codeine 100-10 MG/5ML Oral Solution Take 5 mL by mouth every 6 (six) hours as needed for cough., Normal, Disp-80 mL, R-0                 Supplementary Documentation:

## 2024-10-29 LAB
ATRIAL RATE: 80 BPM
P AXIS: 53 DEGREES
P-R INTERVAL: 176 MS
Q-T INTERVAL: 378 MS
QRS DURATION: 82 MS
QTC CALCULATION (BEZET): 435 MS
R AXIS: 45 DEGREES
T AXIS: 55 DEGREES
VENTRICULAR RATE: 80 BPM

## 2024-10-29 NOTE — DISCHARGE INSTRUCTIONS
Take azithromycin as directed.  Albuterol 2 puffs every 4 hours as needed for cough.  Use the Tessalon Perles for cough.  Use the codeine syrup at night for cough.  Follow-up with your primary care doctor as discussed as well as the gastroenterologist to discuss your liver enzyme elevation.  Return to the ER for changes worsening and read all instructions.

## 2024-10-30 ENCOUNTER — OFFICE VISIT (OUTPATIENT)
Dept: ORTHOPEDICS CLINIC | Facility: CLINIC | Age: 54
End: 2024-10-30
Payer: MEDICAID

## 2024-10-30 VITALS — WEIGHT: 206 LBS | BODY MASS INDEX: 34.32 KG/M2 | HEIGHT: 65 IN

## 2024-10-30 DIAGNOSIS — G56.01 CARPAL TUNNEL SYNDROME OF RIGHT WRIST: Primary | ICD-10-CM

## 2024-10-30 PROCEDURE — 99214 OFFICE O/P EST MOD 30 MIN: CPT | Performed by: ORTHOPAEDIC SURGERY

## 2024-10-30 NOTE — PROGRESS NOTES
Clinic Note      Assessment/Plan:  54 year old female    Right carpal tunnel syndrome -EMG/NCV negative-reports 75% improvement in symptoms for a few days but symptoms recurred thereafter.  I do think that carpal tunnel release will likely provide pain relief that she is looking for however I cannot 100% guarantee it.  She would like to think about surgery and will reach back out if she would like to schedule surgery at some point in the future.  Right thumb CMC arthritis-discussed nonsurgical treatment options which include turmeric/curcumin, CBD oil, Voltaren gel 1%, bracing, NSAIDs, and CMC bracing.    Follow Up: As needed      Diagnostic Studies:     EMG/NCV: Negative for peripheral neuropathy     Physical Exam:    Ht 5' 5\" (1.651 m)   Wt 206 lb (93.4 kg)   BMI 34.28 kg/m²     Constitutional: NAD. AOx3. Well-developed and Well-nourished.   Psychiatric: Normal mood/ affect/ behavior. Judgment and thought content normal.     Right Upper Extremity:   Inspection: Skin Intact. No skin lesions. No gross deformity.   Palpation: Tenderness palpation of the CMC joint, and volar distal forearm   Motion: Elbow: normal bilateral symmetric ext/flex  Wrist: normal bilateral symmetric ext/flex/sup/pro  Finger: full composite fist   Vascular 2+ radial pulse       Median Nerve Exam Right    Phdurkan +   Sensation normal   PCBr Sensation normal   APB Weakness No   Thenar Atrophy No     Ulnar Nerve Exam Right    Sensation normal   1st ELI Weakness No   Hypothenar Atrophy No     Radial Nerve Exam  Right    Radial Sensory normal       CC: Right hand pain    HPI: This 54 year old RHD female presents with pain in the right distal forearm.  Patient reports moderate to severe pain.  She characterizes the pain as throbbing aching sharp shooting and burning in quality.  Does go into the median nerve distribution.  When she lifts heavy things she feels like she loses strength.  She also reports pain at the base of the thumb joint.   She has tried bracing.  Her symptoms do wake her up at nighttime.    Insert interval history: Patient reports 75% improvement after the corticosteroid injection however only lasted a few days.      Past Medical History:    Depression    Esophageal reflux     Past Surgical History:   Procedure Laterality Date    Appendectomy  02/22/2024    Cholecystectomy  01/01/1991    Hysterectomy  01/01/2013    partial hysterectomy, has ovaries    Removal of ovary(s) Right 02/22/2024    RT ovary    Tubal ligation  18 years ago     Current Outpatient Medications   Medication Sig Dispense Refill    azithromycin (ZITHROMAX Z-ES) 250 MG Oral Tab 500 mg once followed by 250 mg daily x 4 days 6 tablet 0    albuterol 108 (90 Base) MCG/ACT Inhalation Aero Soln Inhale 2 puffs into the lungs every 4 (four) hours as needed for Wheezing. 1 each 0    benzonatate 100 MG Oral Cap Take 1 capsule (100 mg total) by mouth 3 (three) times daily as needed for cough. 30 capsule 0    guaiFENesin-codeine 100-10 MG/5ML Oral Solution Take 5 mL by mouth every 6 (six) hours as needed for cough. 80 mL 0    amitriptyline 25 MG Oral Tab Start with 1 pill QHS, for 1 week, then 2 pills qhs foor another week, then 3 pills qhs 270 tablet 1    diazePAM 10 MG Oral Tab 1-2 pills 15 min before MRI 2 tablet 0    naproxen 500 MG Oral Tab Take 1 tablet (500 mg total) by mouth 2 (two) times daily as needed (take with food). (Patient not taking: Reported on 10/30/2024) 60 tablet 0    SUMAtriptan (IMITREX) 50 MG Oral Tab Take 1 tablet (50 mg total) by mouth every 2 (two) hours as needed for Migraine. Use at onset; repeat once after 2 HRS-ONLY 2 IN 24 HR MAX (Patient not taking: Reported on 10/30/2024) 9 tablet 1    aspirin 81 MG Oral Tab EC Take 1 tablet (81 mg total) by mouth daily.       Allergies   Allergen Reactions    Lexapro [Escitalopram]      Family History   Problem Relation Age of Onset    Hypertension Mother     Diabetes Father     Prostate Cancer Father 65     Cancer Paternal Grandmother      Social History     Occupational History    Not on file   Tobacco Use    Smoking status: Former     Types: Cigarettes    Smokeless tobacco: Former     Quit date: 12/29/2013   Substance and Sexual Activity    Alcohol use: Yes     Alcohol/week: 0.0 - 1.0 standard drinks of alcohol    Drug use: No    Sexual activity: Not on file        Review of Systems (negative unless bolded):  General: fevers, chills, fatigue  CV:  chest pain, palpitations, leg swelling  Msk: bodyaches, neck pain, neck stiffness  Skin: rashes, open wounds, nonhealing ulcers  Hem: bleeds easily, bruise easily, immunocompromised  Neuro: dizziness, light headedness, headaches  Psych: anxious, depressed, anger issues    Jose Campo MD   Hand, Wrist, & Elbow Surgery  [email or web address]  t: [phone]  f: [phone]

## 2024-11-19 ENCOUNTER — LAB ENCOUNTER (OUTPATIENT)
Dept: LAB | Age: 54
End: 2024-11-19
Attending: NURSE PRACTITIONER
Payer: MEDICAID

## 2024-11-19 ENCOUNTER — OFFICE VISIT (OUTPATIENT)
Dept: FAMILY MEDICINE CLINIC | Facility: CLINIC | Age: 54
End: 2024-11-19
Payer: MEDICAID

## 2024-11-19 VITALS
DIASTOLIC BLOOD PRESSURE: 81 MMHG | BODY MASS INDEX: 33.92 KG/M2 | SYSTOLIC BLOOD PRESSURE: 138 MMHG | HEART RATE: 72 BPM | HEIGHT: 65 IN | WEIGHT: 203.63 LBS

## 2024-11-19 DIAGNOSIS — Z00.00 WELL ADULT EXAM: Primary | ICD-10-CM

## 2024-11-19 DIAGNOSIS — R73.03 PREDIABETES: ICD-10-CM

## 2024-11-19 DIAGNOSIS — Z12.31 ENCOUNTER FOR SCREENING MAMMOGRAM FOR MALIGNANT NEOPLASM OF BREAST: ICD-10-CM

## 2024-11-19 DIAGNOSIS — K21.9 GASTROESOPHAGEAL REFLUX DISEASE WITHOUT ESOPHAGITIS: ICD-10-CM

## 2024-11-19 DIAGNOSIS — Z23 ENCOUNTER FOR ADMINISTRATION OF VACCINE: ICD-10-CM

## 2024-11-19 LAB
ALBUMIN SERPL-MCNC: 4.7 G/DL (ref 3.2–4.8)
ALBUMIN/GLOB SERPL: 1.2 {RATIO} (ref 1–2)
ALP LIVER SERPL-CCNC: 72 U/L
ALT SERPL-CCNC: 93 U/L
ANION GAP SERPL CALC-SCNC: 8 MMOL/L (ref 0–18)
AST SERPL-CCNC: 93 U/L (ref ?–34)
BASOPHILS # BLD AUTO: 0.08 X10(3) UL (ref 0–0.2)
BASOPHILS NFR BLD AUTO: 1 %
BILIRUB SERPL-MCNC: 0.7 MG/DL (ref 0.3–1.2)
BUN BLD-MCNC: 11 MG/DL (ref 9–23)
BUN/CREAT SERPL: 12.9 (ref 10–20)
CALCIUM BLD-MCNC: 10.2 MG/DL (ref 8.7–10.4)
CHLORIDE SERPL-SCNC: 106 MMOL/L (ref 98–112)
CHOLEST SERPL-MCNC: 163 MG/DL (ref ?–200)
CO2 SERPL-SCNC: 25 MMOL/L (ref 21–32)
CREAT BLD-MCNC: 0.85 MG/DL
DEPRECATED RDW RBC AUTO: 38.3 FL (ref 35.1–46.3)
EGFRCR SERPLBLD CKD-EPI 2021: 82 ML/MIN/1.73M2 (ref 60–?)
EOSINOPHIL # BLD AUTO: 0.28 X10(3) UL (ref 0–0.7)
EOSINOPHIL NFR BLD AUTO: 3.5 %
ERYTHROCYTE [DISTWIDTH] IN BLOOD BY AUTOMATED COUNT: 12.1 % (ref 11–15)
EST. AVERAGE GLUCOSE BLD GHB EST-MCNC: 197 MG/DL (ref 68–126)
FASTING PATIENT LIPID ANSWER: YES
FASTING STATUS PATIENT QL REPORTED: YES
GLOBULIN PLAS-MCNC: 3.8 G/DL (ref 2–3.5)
GLUCOSE BLD-MCNC: 140 MG/DL (ref 70–99)
HBA1C MFR BLD: 8.5 % (ref ?–5.7)
HCT VFR BLD AUTO: 43.6 %
HDLC SERPL-MCNC: 29 MG/DL (ref 40–59)
HGB BLD-MCNC: 14.9 G/DL
IMM GRANULOCYTES # BLD AUTO: 0.02 X10(3) UL (ref 0–1)
IMM GRANULOCYTES NFR BLD: 0.2 %
LDLC SERPL CALC-MCNC: 109 MG/DL (ref ?–100)
LYMPHOCYTES # BLD AUTO: 2.99 X10(3) UL (ref 1–4)
LYMPHOCYTES NFR BLD AUTO: 37.3 %
MCH RBC QN AUTO: 29.5 PG (ref 26–34)
MCHC RBC AUTO-ENTMCNC: 34.2 G/DL (ref 31–37)
MCV RBC AUTO: 86.3 FL
MONOCYTES # BLD AUTO: 0.98 X10(3) UL (ref 0.1–1)
MONOCYTES NFR BLD AUTO: 12.2 %
NEUTROPHILS # BLD AUTO: 3.66 X10 (3) UL (ref 1.5–7.7)
NEUTROPHILS # BLD AUTO: 3.66 X10(3) UL (ref 1.5–7.7)
NEUTROPHILS NFR BLD AUTO: 45.8 %
NONHDLC SERPL-MCNC: 134 MG/DL (ref ?–130)
OSMOLALITY SERPL CALC.SUM OF ELEC: 290 MOSM/KG (ref 275–295)
PLATELET # BLD AUTO: 301 10(3)UL (ref 150–450)
POTASSIUM SERPL-SCNC: 4.4 MMOL/L (ref 3.5–5.1)
PROT SERPL-MCNC: 8.5 G/DL (ref 5.7–8.2)
RBC # BLD AUTO: 5.05 X10(6)UL
SODIUM SERPL-SCNC: 139 MMOL/L (ref 136–145)
TRIGL SERPL-MCNC: 140 MG/DL (ref 30–149)
TSI SER-ACNC: 3.01 UIU/ML (ref 0.55–4.78)
VLDLC SERPL CALC-MCNC: 24 MG/DL (ref 0–30)
WBC # BLD AUTO: 8 X10(3) UL (ref 4–11)

## 2024-11-19 PROCEDURE — 83036 HEMOGLOBIN GLYCOSYLATED A1C: CPT | Performed by: NURSE PRACTITIONER

## 2024-11-19 PROCEDURE — 85025 COMPLETE CBC W/AUTO DIFF WBC: CPT | Performed by: NURSE PRACTITIONER

## 2024-11-19 PROCEDURE — 99396 PREV VISIT EST AGE 40-64: CPT | Performed by: NURSE PRACTITIONER

## 2024-11-19 PROCEDURE — 80053 COMPREHEN METABOLIC PANEL: CPT | Performed by: NURSE PRACTITIONER

## 2024-11-19 PROCEDURE — 84443 ASSAY THYROID STIM HORMONE: CPT | Performed by: NURSE PRACTITIONER

## 2024-11-19 PROCEDURE — 36415 COLL VENOUS BLD VENIPUNCTURE: CPT | Performed by: NURSE PRACTITIONER

## 2024-11-19 PROCEDURE — 80061 LIPID PANEL: CPT | Performed by: NURSE PRACTITIONER

## 2024-11-19 RX ORDER — PANTOPRAZOLE SODIUM 40 MG/1
40 TABLET, DELAYED RELEASE ORAL
Qty: 90 TABLET | Refills: 1 | Status: SHIPPED | OUTPATIENT
Start: 2024-11-19 | End: 2024-11-19

## 2024-11-19 RX ORDER — LANSOPRAZOLE 30 MG/1
30 CAPSULE, DELAYED RELEASE ORAL
Qty: 90 CAPSULE | Refills: 1 | Status: SHIPPED | OUTPATIENT
Start: 2024-11-19

## 2024-11-19 NOTE — PROGRESS NOTES
HPI    Patient presents for annual physical.  With a history of prediabetes years ago.  Has been checking blood sugar with dad's glucometer and having elevated fasting readings in the 200s.  Has been having some abdominal pain for the past few weeks.  Also with concerns of dry mouth and urinary frequency.  Also with concerns of increased heartburn.  Takes pantoprazole which she gets from [place] that helps her.      Mammogram - 10/18/2023, normal.    Colonoscopy -   Diet - diet has been healthy.    Exercise - has been very active at work.      Review of Systems   Gastrointestinal:  Positive for abdominal pain (spasm).   Endocrine: Positive for polydipsia.   All other systems reviewed and are negative.       Vitals:    11/19/24 1036   BP: 138/81   Pulse: 72   Weight: 203 lb 9.6 oz (92.4 kg)   Height: 5' 5\" (1.651 m)     Body mass index is 33.88 kg/m².    Health Maintenance   Topic Date Due    Annual Physical  Never done    Colorectal Cancer Screening  Never done    Zoster Vaccines (1 of 2) Never done    DTaP,Tdap,and Td Vaccines (2 - Td or Tdap) 09/30/2021    Influenza Vaccine (1) 10/01/2024    Mammogram  10/18/2024    Annual Depression Screening  Completed    Pneumococcal Vaccine: Birth to 64yrs  Aged Out       No LMP recorded. Patient has had a hysterectomy.    Past Medical History:    Depression    Esophageal reflux       .  Past Surgical History:   Procedure Laterality Date    Appendectomy  02/22/2024    Cholecystectomy  01/01/1991    Hysterectomy  01/01/2013    partial hysterectomy, has ovaries    Removal of ovary(s) Right 02/22/2024    RT ovary    Tubal ligation  18 years ago       Family History   Problem Relation Age of Onset    Hypertension Mother     Diabetes Father     Prostate Cancer Father 65    Cancer Paternal Grandmother        Social History     Socioeconomic History    Marital status:      Spouse name: Not on file    Number of children: Not on file    Years of education: Not on file    Highest  education level: Not on file   Occupational History    Not on file   Tobacco Use    Smoking status: Former     Types: Cigarettes    Smokeless tobacco: Former     Quit date: 12/29/2013   Substance and Sexual Activity    Alcohol use: Yes     Alcohol/week: 0.0 - 1.0 standard drinks of alcohol    Drug use: No    Sexual activity: Not on file   Other Topics Concern    Not on file   Social History Narrative    Not on file     Social Drivers of Health     Financial Resource Strain: Low Risk  (8/12/2023)    Received from [facility]    Financial Resource Strain     In the past year, have you or any family members you live with been unable to get any of the following when it was really needed? Check all that apply.: None   Food Insecurity: Not At Risk (8/12/2023)    Received from [facility]    Food Insecurity     RETIRE How often in the past 12 months would you say you are worried or stressed about having enough money to buy nutritious meals? : Never   Transportation Needs: No Transportation Needs (8/12/2023)    Received from [facility]    PRAPARE - Transportation     In the past 12 months, has lack of transportation kept you from medical appointments or from getting medications?: No     In the past 12 months, has lack of transportation kept you from meetings, work, or from getting things needed for daily living?: No   Physical Activity: Not on file   Stress: Not on file   Social Connections: Low Risk  (8/12/2023)    Received from [facility]    Social Connections     How often do you see or talk to people that you care about and feel close to? (For example: talking to friends on the phone, visiting friends or family, going to [place] or club meetings): 5 or more times a week   Housing Stability: Not on file       Current Outpatient  Medications   Medication Sig Dispense Refill    pantoprazole 40 MG Oral Tab EC Take 1 tablet (40 mg total) by mouth every morning before breakfast. 90 tablet 1    albuterol 108 (90 Base) MCG/ACT Inhalation Aero Soln Inhale 2 puffs into the lungs every 4 (four) hours as needed for Wheezing. 1 each 0    benzonatate 100 MG Oral Cap Take 1 capsule (100 mg total) by mouth 3 (three) times daily as needed for cough. 30 capsule 0    diazePAM 10 MG Oral Tab 1-2 pills 15 min before MRI 2 tablet 0    aspirin 81 MG Oral Tab EC Take 1 tablet (81 mg total) by mouth daily.         Allergies:  Allergies[1]    Physical Exam  Vitals and nursing note reviewed.   Constitutional:       General: She is not in acute distress.     Appearance: Normal appearance. She is well-developed. She is not ill-appearing, toxic-appearing or diaphoretic.   HENT:      Head: Normocephalic and atraumatic.      Right Ear: Hearing, tympanic membrane, ear canal and external ear normal. There is no impacted cerumen.      Left Ear: Hearing, tympanic membrane, ear canal and external ear normal. There is no impacted cerumen.      Nose: Nose normal. No congestion.      Mouth/Throat:      Mouth: Mucous membranes are moist.      Pharynx: Oropharynx is clear. No oropharyngeal exudate or posterior oropharyngeal erythema.   Eyes:      General:         Right eye: No discharge.         Left eye: No discharge.      Conjunctiva/sclera: Conjunctivae normal.   Neck:      Thyroid: No thyromegaly.   Cardiovascular:      Rate and Rhythm: Normal rate and regular rhythm.      Pulses: Normal pulses.      Heart sounds: Normal heart sounds. No murmur heard.  Pulmonary:      Effort: Pulmonary effort is normal. No respiratory distress.      Breath sounds: Normal breath sounds. No stridor. No wheezing, rhonchi or rales.   Chest:      Chest wall: No tenderness.   Abdominal:      General: Abdomen is flat. Bowel sounds are normal. There is no distension.      Palpations: Abdomen is soft.  There is no mass.      Tenderness: There is abdominal tenderness in the right upper quadrant and epigastric area. There is no guarding or rebound.      Hernia: No hernia is present.   Musculoskeletal:         General: Normal range of motion.      Cervical back: Normal range of motion and neck supple.   Lymphadenopathy:      Cervical: No cervical adenopathy.   Skin:     General: Skin is warm and dry.   Neurological:      Mental Status: She is alert and oriented to person, place, and time.   Psychiatric:         Mood and Affect: Mood normal.         Behavior: Behavior normal.         Thought Content: Thought content normal.         Judgment: Judgment normal.          Assessment and Plan:  Problem List Items Addressed This Visit    None  Visit Diagnoses       Well adult exam    -  Primary    Relevant Orders    Lipid Panel    Comp Metabolic Panel (14)    CBC With Differential With Platelet    TSH W Reflex To Free T4    Hemoglobin A1C    [facility] ISREAL 2D+3D SCREENING BILAT (CPT=77067/89779)    Prediabetes        Relevant Orders    Hemoglobin A1C    Encounter for screening mammogram for malignant neoplasm of breast        Relevant Orders    [facility] ISREAL 2D+3D SCREENING BILAT (CPT=77067/35788)    Encounter for administration of vaccine        Gastroesophageal reflux disease without esophagitis        Relevant Medications    pantoprazole 40 MG Oral Tab EC           Fasting labs today.  Order placed for mammogram.  Refill of pantoprazole to pharmacy on file.     Discussed plan of care with patient and patient is in agreement.  All questions answered. Patient to call with questions or concerns.    Encouraged to sign up for My Chart if not already registered.        [1]   Allergies  Allergen Reactions    Lexapro [Escitalopram]

## 2024-11-19 NOTE — TELEPHONE ENCOUNTER
Per pharmacy, plan does not cover medication      pantoprazole 40 MG Oral Tab EC, Take 1 tablet (40 mg total) by mouth every morning before breakfast., Disp: 90 tablet, Rfl: 1    ALTERNATIVE; LANSOPRAZOLE

## 2024-11-20 ENCOUNTER — TELEPHONE (OUTPATIENT)
Dept: ORTHOPEDICS CLINIC | Facility: CLINIC | Age: 54
End: 2024-11-20

## 2024-11-20 ENCOUNTER — TELEPHONE (OUTPATIENT)
Dept: FAMILY MEDICINE CLINIC | Facility: CLINIC | Age: 54
End: 2024-11-20

## 2024-11-20 ENCOUNTER — OFFICE VISIT (OUTPATIENT)
Dept: FAMILY MEDICINE CLINIC | Facility: CLINIC | Age: 54
End: 2024-11-20
Payer: MEDICAID

## 2024-11-20 VITALS
HEIGHT: 65 IN | SYSTOLIC BLOOD PRESSURE: 121 MMHG | WEIGHT: 203.63 LBS | DIASTOLIC BLOOD PRESSURE: 73 MMHG | BODY MASS INDEX: 33.92 KG/M2 | HEART RATE: 66 BPM

## 2024-11-20 DIAGNOSIS — G56.01 CARPAL TUNNEL SYNDROME OF RIGHT WRIST: Primary | ICD-10-CM

## 2024-11-20 DIAGNOSIS — E11.9 TYPE 2 DIABETES MELLITUS WITHOUT COMPLICATION, WITHOUT LONG-TERM CURRENT USE OF INSULIN (HCC): Primary | ICD-10-CM

## 2024-11-20 PROCEDURE — 99214 OFFICE O/P EST MOD 30 MIN: CPT | Performed by: NURSE PRACTITIONER

## 2024-11-20 RX ORDER — LISINOPRIL 5 MG/1
5 TABLET ORAL DAILY
Qty: 90 TABLET | Refills: 3 | Status: SHIPPED | OUTPATIENT
Start: 2024-11-20

## 2024-11-20 RX ORDER — ATORVASTATIN CALCIUM 10 MG/1
10 TABLET, FILM COATED ORAL NIGHTLY
Qty: 90 TABLET | Refills: 3 | Status: SHIPPED | OUTPATIENT
Start: 2024-11-20

## 2024-11-20 NOTE — TELEPHONE ENCOUNTER
Date of Surgery: 2024    Post Op Appt: 2025 0800AM    Case ID: [id number]    Notes:     SURGICAL BOOKING SHEET   Name: Lissette Block  MRN: [id number]   : 1970     Surgical Date:    25   Surgical Consent:    Right endoscopic carpal tunnel release, possible open   Diagnosis:         ICD-10-CM   1. Carpal tunnel syndrome of right wrist  G56.01       Workers Comp: No   Procedure Codes:    Endoscopic carpal tunnel release (CPT 60470)   Disposition:    Outpatient   Operative Time:    15 mins   Antibiotics:    Ancef 2g   Anesthesia Type:    Monitored Anesthesia Care (MAC)   Clearance:     None   Equipment:    ECTR: Davie Blade, Microaire Endoscopic System, Local Pre-Mix (1% lidocaine w/ epi, 0.5% marcaine, and 8.4% NaBicarb) , 5-0 moncryl, Exofin, Telfa+Tegaderm   Standby: Lead Hand, 4-0 Nylon PS-2   Assistant:    Alber Assistant: Yes, Ayse Chapman PA-C   Follow Up:    7-14 days post op with Ayse   Pain Medication:    Tramadol 50 mg   Therapy:    None

## 2024-11-20 NOTE — PROGRESS NOTES
HPI    Patient presents to discuss test results.  With hemoglobin A1c of 8.5.     Review of Systems   All other systems reviewed and are negative.       Vitals:    11/20/24 1056   BP: 121/73   Pulse: 66   Weight: 203 lb 9.6 oz (92.4 kg)   Height: 5' 5\" (1.651 m)     Body mass index is 33.88 kg/m².    Health Maintenance   Topic Date Due    Colorectal Cancer Screening  Never done    Zoster Vaccines (1 of 2) Never done    DTaP,Tdap,and Td Vaccines (2 - Td or Tdap) 09/30/2021    Influenza Vaccine (1) 10/01/2024    Mammogram  10/18/2024    Annual Physical  11/19/2025    Annual Depression Screening  Completed    Pneumococcal Vaccine: Birth to 64yrs  Aged Out       No LMP recorded. Patient has had a hysterectomy.    Past Medical History:    Depression    Esophageal reflux       .  Past Surgical History:   Procedure Laterality Date    Appendectomy  02/22/2024    Cholecystectomy  01/01/1991    Hysterectomy  01/01/2013    partial hysterectomy, has ovaries    Removal of ovary(s) Right 02/22/2024    RT ovary    Tubal ligation  18 years ago       Family History   Problem Relation Age of Onset    Hypertension Mother     Diabetes Father     Prostate Cancer Father 65    Cancer Paternal Grandmother        Social History     Socioeconomic History    Marital status:      Spouse name: Not on file    Number of children: Not on file    Years of education: Not on file    Highest education level: Not on file   Occupational History    Not on file   Tobacco Use    Smoking status: Former     Types: Cigarettes    Smokeless tobacco: Former     Quit date: 12/29/2013   Vaping Use    Vaping status: Never Used   Substance and Sexual Activity    Alcohol use: Yes     Alcohol/week: 0.0 - 1.0 standard drinks of alcohol    Drug use: No    Sexual activity: Not on file   Other Topics Concern    Not on file   Social History Narrative    Not on file     Social Drivers of Health     Financial Resource Strain: Low Risk  (8/12/2023)    Received from  [facility]    Financial Resource Strain     In the past year, have you or any family members you live with been unable to get any of the following when it was really needed? Check all that apply.: None   Food Insecurity: Not At Risk (8/12/2023)    Received from [facility], [facility]    Food Insecurity     RETIRE How often in the past 12 months would you say you are worried or stressed about having enough money to buy nutritious meals? : Never   Transportation Needs: No Transportation Needs (8/12/2023)    [facility] from [facility], [facility], [facility]    PRAPARE - Transportation     In the past 12 months, has lack of transportation kept you from medical appointments or from getting medications?: No     In the past 12 months, has lack of transportation kept you from meetings, work, or from getting things needed for daily living?: No   Physical Activity: Not on file   Stress: Not on file   Social Connections: Low Risk  (8/12/2023)    Received from [facility], [facility]    Social Connections     How often do you see or talk to people that you care about and feel close to? (For example: talking to friends on the phone, visiting friends or family, going to [employer] or club meetings): 5 or more times a week   Housing Stability: Not on file       Current Outpatient Medications   Medication Sig Dispense Refill    metFORMIN HCl 1000 MG Oral Tab Take 1 tablet (1,000 mg total) by mouth 2 (two) times daily with meals. 180 tablet 3    atorvastatin 10 MG Oral Tab Take 1 tablet (10 mg total) by mouth nightly. 90 tablet 3    lisinopril 5 MG Oral Tab Take 1 tablet (5 mg total) by mouth daily. 90 tablet 3    empagliflozin 10 MG Oral Tab Take 1 tablet (10 mg total) by mouth daily. 90 tablet 3    lansoprazole 30 MG Oral Capsule Delayed Release Take 1 capsule (30 mg total) by mouth every  morning before breakfast. 90 capsule 1    albuterol 108 (90 Base) MCG/ACT Inhalation Aero Soln Inhale 2 puffs into the lungs every 4 (four) hours as needed for Wheezing. 1 each 0    benzonatate 100 MG Oral Cap Take 1 capsule (100 mg total) by mouth 3 (three) times daily as needed for cough. 30 capsule 0    diazePAM 10 MG Oral Tab 1-2 pills 15 min before MRI 2 tablet 0    aspirin 81 MG Oral Tab EC Take 1 tablet (81 mg total) by mouth daily.         Allergies:  Allergies[1]    Physical Exam  Vitals and nursing note reviewed.   Constitutional:       General: She is not in acute distress.     Appearance: Normal appearance.   Pulmonary:      Effort: No respiratory distress.   Neurological:      Mental Status: She is alert and oriented to person, place, and time.          Assessment and Plan:  Problem List Items Addressed This Visit    None  Visit Diagnoses       Type 2 diabetes mellitus without complication, without long-term current use of insulin (HCC)    -  Primary    Relevant Medications    metFORMIN HCl 1000 MG Oral Tab    atorvastatin 10 MG Oral Tab    lisinopril 5 MG Oral Tab    empagliflozin 10 MG Oral Tab    Other Relevant Orders    Diabetic Education - [facility]    Ophthalmology Referral - In Network    Diabetes Navigator           Patient to follow-up for diabetes education class.  To start metformin twice daily, Jardiance once daily, atorvastatin once daily, lisinopril once daily.  Referral to ophthalmology for diabetic eye exam.  Patient to follow-up in 3 months.     Discussed plan of care with patient and patient is in agreement.  All questions answered. Patient to call with questions or concerns.    Encouraged to sign up for My Chart if not already registered.        [1]   Allergies  Allergen Reactions    Lexapro [Escitalopram]

## 2024-11-20 NOTE — TELEPHONE ENCOUNTER
Prior Authorization Needed:      Current Outpatient Medications   Medication Sig Dispense Refill    lansoprazole 30 MG Oral Capsule Delayed Release Take 1 capsule (30 mg total) by mouth every morning before breakfast. 90 capsule 1     Login to go.[employer]/login and click \"Enter a Key\"    Key: [id number]  Last Name: Hitesh  : 1970    Please advise

## 2024-11-21 NOTE — TELEPHONE ENCOUNTER
Denied    Note from payer: Your PA request has been denied.  Additional information will be provided in the denial communication. (Message 114)  Payer: [facility] Case ID: [id number]    [phone]  Electronic appeal: Not supported  Appeal instructions: Your PA request has been denied.  Additional information will be provided in the denial communication. (Message 114)  View History

## 2024-11-21 NOTE — TELEPHONE ENCOUNTER
Prior authorization for lansoprazole was done through [employer] scripts. It can take 1-5 business days for a decision to come back

## 2024-11-21 NOTE — TELEPHONE ENCOUNTER
Please inform patient that prior authorization was denied.  She may use [facility] if she would like to see if she can get it covered at a lower rate at pay out of pocket.  Thank you.

## 2024-11-22 NOTE — TELEPHONE ENCOUNTER
Verified name and , patient understood medication was denied and will try to use [facility] to see if she can get it covered at a lower rate.

## 2024-11-25 ENCOUNTER — PATIENT MESSAGE (OUTPATIENT)
Dept: FAMILY MEDICINE CLINIC | Facility: CLINIC | Age: 54
End: 2024-11-25

## 2024-11-26 NOTE — TELEPHONE ENCOUNTER
See mychart messages. Awaiting patients' response on which medication made her sick before forwarding to PHILLIP Brooks.      Please reply to pool: EM RN TRIAGE

## 2024-12-02 ENCOUNTER — HOSPITAL ENCOUNTER (OUTPATIENT)
Dept: ENDOCRINOLOGY | Facility: HOSPITAL | Age: 54
End: 2024-12-02
Attending: NURSE PRACTITIONER
Payer: MEDICAID

## 2024-12-02 DIAGNOSIS — E11.9 NEWLY DIAGNOSED DIABETES (HCC): Primary | ICD-10-CM

## 2024-12-02 NOTE — PROGRESS NOTES
Lissette Block : 1970  attended individual initial assessment for Diabetes Education:    Date: 2024         Start time: 9A End time: 930A    HgbA1C (%)   Date Value   2024 8.5 (H)       Wt Readings from Last 1 Encounters:   24 203 lb 9.6 oz       Assessment:   Mother in Law was on dialysis  Dad with DM - 5 years    Diet Recall:   Before diabetes:   (AM) coffee and bread - sweet (creamer, no sugar in coffee)   Snacks on doritos, cookies  (6P) 4-5 tortillas, tries   (Before bed) milk and sweet bread (10p)  (Fluids) prev soda - regular soda -- pepsi, sprite, juices      Now after diagnosis  (AM) coffee with milk, boiled egg OR toast with honey   Mid morning: something light - soup, veggies, boiled chicken - chili  (Dinner) cajun boil - potato, 3 shrimp, corn on the cob  (Fluids) water  ([place]) cookies- mexican gaettas golden - ([employer] store), apples, baby carrots    Education provided on the below topics:     Diabetes Overview:  Pathophysiology, A1C results and treatment options for diabetes self-management reviewed.   Described basic process and treatment options for diabetes self-management.  Introduced long term impact of uncontrolled blood glucose on health.    Healthy Eating:  Obtained usual diet history.  Instructed on Basic Diet Guidelines which includes eating 3 meals/day. Eat moderate amounts at consistent times from day to day. Limit/avoid sweets. Instructed on Balanced Plate Method of meal planning. Instructed to limit grains or starchy vegetables to ¼ of plate, protein to ¼ of plate, non-starchy vegetables to ½ plate and modest portions of fruit, yogurt, milk as desired.    Being Active:   Encouraged Physical Activity and briefly reviewed ADA recommended guidelines for activity with type 2 diabetes.    Taking Medications:   Reviewed current diabetes medications (if applicable).    Monitoring:  Instructed/reinforced blood glucose monitoring, testing schedules and  target goals:   Fasting / Pre-meal  mg/dL 2 Hour Post-prandial <180 mg/dL  Demonstrated ability to perform blood glucose testing.     Problem Solving:   Prevention, detection and treatment of acute complications: taught symptoms of hypoglycemia, hyperglycemia, how to treat low blood sugar (Rule of 15) and actions for lowering high blood glucose levels.     Behavior Change:  Initiated individualized goal setting process and will continue to refine throughout class series.    Recommendations:      Monitor blood glucose as directed.  Follow Balance Plate method of meal planning.   Attend all Group Class in series  - due to wait time for class would like additional visit for more information/accountability    Written materials provided for all areas covered.  Patient verbalized understanding and all questions answered.    Minoo Draper RD

## 2024-12-04 ENCOUNTER — TELEPHONE (OUTPATIENT)
Dept: ENDOCRINOLOGY | Facility: HOSPITAL | Age: 54
End: 2024-12-04

## 2024-12-04 DIAGNOSIS — E11.9 NEWLY DIAGNOSED DIABETES (HCC): Primary | ICD-10-CM

## 2024-12-04 DIAGNOSIS — E11.9 TYPE 2 DIABETES MELLITUS WITHOUT COMPLICATION, WITHOUT LONG-TERM CURRENT USE OF INSULIN (HCC): ICD-10-CM

## 2024-12-04 NOTE — TELEPHONE ENCOUNTER
Can we please get an order for MNT (Medical Nutrition Therapy) at [facility] for patient's upcoming appointment? Thanks!

## 2024-12-04 NOTE — TELEPHONE ENCOUNTER
Georgina Krause,  at [facility], requesting new order to meet insurance guidelines for upcoming appt at [facility].     Can you please sign attached pended order? Thank you!

## 2024-12-14 ENCOUNTER — OFFICE VISIT (OUTPATIENT)
Dept: FAMILY MEDICINE CLINIC | Facility: CLINIC | Age: 54
End: 2024-12-14

## 2024-12-14 VITALS
HEART RATE: 72 BPM | DIASTOLIC BLOOD PRESSURE: 62 MMHG | SYSTOLIC BLOOD PRESSURE: 104 MMHG | WEIGHT: 199 LBS | HEIGHT: 65 IN | BODY MASS INDEX: 33.15 KG/M2

## 2024-12-14 DIAGNOSIS — N89.8 VAGINAL DISCHARGE: Primary | ICD-10-CM

## 2024-12-14 DIAGNOSIS — R35.0 URINARY FREQUENCY: ICD-10-CM

## 2024-12-14 DIAGNOSIS — R81 GLYCOSURIA: ICD-10-CM

## 2024-12-14 LAB
APPEARANCE: CLEAR
BILIRUBIN: NEGATIVE
GLUCOSE (URINE DIPSTICK): 500 MG/DL
KETONES (URINE DIPSTICK): NEGATIVE MG/DL
LEUKOCYTES: NEGATIVE
MULTISTIX LOT#: ABNORMAL NUMERIC
NITRITE, URINE: NEGATIVE
PH, URINE: 5.5 (ref 4.5–8)
PROTEIN (URINE DIPSTICK): 30 MG/DL
SPECIFIC GRAVITY: 1.02 (ref 1–1.03)
URINE-COLOR: YELLOW
UROBILINOGEN,SEMI-QN: 0.2 MG/DL (ref 0–1.9)

## 2024-12-14 PROCEDURE — 81002 URINALYSIS NONAUTO W/O SCOPE: CPT | Performed by: NURSE PRACTITIONER

## 2024-12-14 PROCEDURE — 99215 OFFICE O/P EST HI 40 MIN: CPT | Performed by: NURSE PRACTITIONER

## 2024-12-14 RX ORDER — FLUCONAZOLE 200 MG/1
TABLET ORAL
Qty: 2 TABLET | Refills: 0 | Status: SHIPPED | OUTPATIENT
Start: 2024-12-14

## 2024-12-14 NOTE — PROGRESS NOTES
HPI    Patient presents for concerns of vaginal irritation, vaginal discharge, urinary frequency for the past week.  Recently started Jardiance for diabetes.    Review of Systems   Genitourinary:  Positive for frequency and vaginal discharge.        Vaginal irritation.        Vitals:    12/14/24 0857   BP: 104/62   Pulse: 72   Weight: 199 lb (90.3 kg)   Height: 5' 5\" (1.651 m)     Body mass index is 33.12 kg/m².    Health Maintenance   Topic Date Due    Diabetes Care Foot Exam  Never done    Diabetes Care Dilated Eye Exam  Never done    Colorectal Cancer Screening  Never done    Diabetes Care: Microalb/Creat Ratio  Never done    Pneumococcal Vaccine: Birth to 64yrs (1 of 2 - PCV) Never done    Zoster Vaccines (1 of 2) Never done    DTaP,Tdap,and Td Vaccines (2 - Td or Tdap) 09/30/2021    Influenza Vaccine (1) 10/01/2024    Mammogram  10/18/2024    Diabetes Care A1C  02/19/2025    Annual Physical  11/19/2025    Diabetes Care: GFR  11/19/2025    Annual Depression Screening  Completed       No LMP recorded. Patient has had a hysterectomy.    Past Medical History:    Depression    Esophageal reflux       .  Past Surgical History:   Procedure Laterality Date    Appendectomy  02/22/2024    Cholecystectomy  01/01/1991    Hysterectomy  01/01/2013    partial hysterectomy, has ovaries    Removal of ovary(s) Right 02/22/2024    RT ovary    Tubal ligation  18 years ago       Family History   Problem Relation Age of Onset    Hypertension Mother     Diabetes Father     Prostate Cancer Father 65    Cancer Paternal Grandmother        Social History     Socioeconomic History    Marital status:      Spouse name: Not on file    Number of children: Not on file    Years of education: Not on file    Highest education level: Not on file   Occupational History    Not on file   Tobacco Use    Smoking status: Former     Types: Cigarettes    Smokeless tobacco: Former     Quit date: 12/29/2013   Vaping Use    Vaping status: Never Used    Substance and Sexual Activity    Alcohol use: Yes     Alcohol/week: 0.0 - 1.0 standard drinks of alcohol    Drug use: No    Sexual activity: Not on file   Other Topics Concern    Not on file   Social History Narrative    Not on file     Social Drivers of Health     Financial Resource Strain: Low Risk  (8/12/2023)    Received from [facility] [employer], [facility]    Financial Resource Strain     In the past year, have you or any family members you live with been unable to get any of the following when it was really needed? Check all that apply.: None   Food Insecurity: Not At Risk (8/12/2023)    Received from [facility] [employer], [facility]    Food Insecurity     RETIRE How often in the past 12 months would you say you are worried or stressed about having enough money to buy nutritious meals? : Never   Transportation Needs: No Transportation Needs (8/12/2023)    Received from [facility] [employer], [facility] [employer], [facility] [employer], [facility]    PRAPARE - Transportation     In the past 12 months, has lack of transportation kept you from medical appointments or from getting medications?: No     In the past 12 months, has lack of transportation kept you from meetings, work, or from getting things needed for daily living?: No   Physical Activity: Not on file   Stress: Not on file   Social Connections: Low Risk  (8/12/2023)    Received from [facility] [employer], [facility]    Social Connections     How often do you see or talk to people that you care about and feel close to? (For example: talking to friends on the phone, visiting friends or family, going to Taoism or club meetings): 5 or more times a week   Housing Stability: Not on file       Current Outpatient Medications   Medication Sig Dispense Refill    fluconazole 200 MG Oral Tab Take one tablet today, may repeat in 3 days if symptoms remain 2 tablet 0    metFORMIN HCl 1000 MG Oral Tab Take 1  tablet (1,000 mg total) by mouth 2 (two) times daily with meals. 180 tablet 3    lisinopril 5 MG Oral Tab Take 1 tablet (5 mg total) by mouth daily. 90 tablet 3    empagliflozin 10 MG Oral Tab Take 1 tablet (10 mg total) by mouth daily. 90 tablet 3    diazePAM 10 MG Oral Tab 1-2 pills 15 min before MRI 2 tablet 0    aspirin 81 MG Oral Tab EC Take 1 tablet (81 mg total) by mouth daily.      lansoprazole 30 MG Oral Capsule Delayed Release Take 1 capsule (30 mg total) by mouth every morning before breakfast. 90 capsule 1       Allergies:  Allergies[1]    Physical Exam  Vitals and nursing note reviewed.   Constitutional:       General: She is not in acute distress.     Appearance: Normal appearance.   Pulmonary:      Effort: No respiratory distress.   Genitourinary:     Labia:         Right: Rash and tenderness present.         Left: Rash and tenderness present.    Neurological:      Mental Status: She is alert and oriented to person, place, and time.          Assessment and Plan:  Problem List Items Addressed This Visit    None  Visit Diagnoses       Vaginal discharge    -  Primary    Relevant Orders    Vaginitis Vaginosis PCR Panel    Urinary frequency        Relevant Orders    POC Urinalysis, Manual Dip without microscopy [[id number]]    Glycosuria        Relevant Medications    fluconazole 200 MG Oral Tab           Urinalysis in office today with glycosuria.  Fluconazole to pharmacy on file for symptom relief.  May trial over-the-counter Monistat cream as directed, as well.  Supportive care discussed.  If no relief of symptoms in the near future, may consider discontinuing Jardiance and switching to another medication for diabetes management.     Discussed plan of care with patient and patient is in agreement.  All questions answered. Patient to call with questions or concerns.    Encouraged to sign up for My Chart if not already registered.        [1]   Allergies  Allergen Reactions    Lexapro [Escitalopram]

## 2024-12-15 DIAGNOSIS — B96.89 BV (BACTERIAL VAGINOSIS): Primary | ICD-10-CM

## 2024-12-15 DIAGNOSIS — N76.0 BV (BACTERIAL VAGINOSIS): Primary | ICD-10-CM

## 2024-12-15 LAB
BV BACTERIA DNA VAG QL NAA+PROBE: POSITIVE
C GLABRATA DNA VAG QL NAA+PROBE: NEGATIVE
C KRUSEI DNA VAG QL NAA+PROBE: NEGATIVE
CANDIDA DNA VAG QL NAA+PROBE: POSITIVE
T VAGINALIS DNA VAG QL NAA+PROBE: NEGATIVE

## 2024-12-15 RX ORDER — METRONIDAZOLE 500 MG/1
500 TABLET ORAL 2 TIMES DAILY
Qty: 14 TABLET | Refills: 0 | Status: SHIPPED | OUTPATIENT
Start: 2024-12-15 | End: 2024-12-22

## 2024-12-18 ENCOUNTER — APPOINTMENT (OUTPATIENT)
Dept: ENDOCRINOLOGY | Facility: HOSPITAL | Age: 54
End: 2024-12-18
Attending: NURSE PRACTITIONER
Payer: MEDICAID

## 2025-01-10 ENCOUNTER — TELEPHONE (OUTPATIENT)
Dept: ENDOCRINOLOGY | Facility: HOSPITAL | Age: 55
End: 2025-01-10

## 2025-01-10 NOTE — TELEPHONE ENCOUNTER
Patient was scheduled to start Group Education Classes at [facility] on 1/9. Patient no showed the first class. Since all classes must be taken in order, remaining appts were canceled. Called and LVM for patient informing her of the cancellation and let her know if she'd like to reschedule she can call the office. Provided office phone number.

## 2025-01-13 ENCOUNTER — HOSPITAL ENCOUNTER (OUTPATIENT)
Dept: GENERAL RADIOLOGY | Age: 55
Discharge: HOME | End: 2025-01-13
Attending: STUDENT IN AN ORGANIZED HEALTH CARE EDUCATION/TRAINING PROGRAM
Payer: MEDICAID

## 2025-01-13 ENCOUNTER — OFFICE VISIT (OUTPATIENT)
Dept: PODIATRY CLINIC | Facility: CLINIC | Age: 55
End: 2025-01-13
Payer: MEDICAID

## 2025-01-13 DIAGNOSIS — M72.2 PLANTAR FASCIITIS: ICD-10-CM

## 2025-01-13 DIAGNOSIS — M79.671 PAIN OF RIGHT HEEL: ICD-10-CM

## 2025-01-13 DIAGNOSIS — M79.671 PAIN OF RIGHT HEEL: Primary | ICD-10-CM

## 2025-01-13 PROBLEM — G45.9 TIA (TRANSIENT ISCHEMIC ATTACK): Status: ACTIVE | Noted: 2023-08-12

## 2025-01-13 PROCEDURE — 73630 X-RAY EXAM OF FOOT: CPT | Performed by: STUDENT IN AN ORGANIZED HEALTH CARE EDUCATION/TRAINING PROGRAM

## 2025-01-13 PROCEDURE — 99204 OFFICE O/P NEW MOD 45 MIN: CPT | Performed by: STUDENT IN AN ORGANIZED HEALTH CARE EDUCATION/TRAINING PROGRAM

## 2025-01-13 NOTE — PROGRESS NOTES
[facility] Podiatry  Progress Note      Lissette Block is a 54 year old female.   Chief Complaint   Patient presents with    Foot Pain     New pt- R foot- back of the heel- onset- 2-3 wks ago- no recent injury- also - rates pain 8/10 most of the time-  last A1c= 8.5 on 11/19/2024- per pt she is diabetic    Toe Pain     New pt- bilateral hallux pain - onset- 3 mos ago after pedicure- rates pain 6-10/10 all the time             HPI:     Patient is a pleasant 54-year-old diabetic female presents to clinic for bilateral foot evaluation.  Patient admits to bilateral hallux toenail pain after getting a pedicure 3 months ago.  No signs of infection noted.  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  Denies injuries or trauma.  Today she rates her pain an 8 out of 10.    Allergies: Lexapro [escitalopram]    Current Outpatient Medications   Medication Sig Dispense Refill    omeprazole 20 MG Oral Capsule Delayed Release Take 1 capsule (20 mg total) by mouth every morning before breakfast.      Omega-3 Fatty Acids (OMEGA-3 FISH OIL OR) Take by mouth.      VITAMIN E OR       B COMPLEX-C ER OR Take by mouth.      metFORMIN HCl 1000 MG Oral Tab Take 1 tablet (1,000 mg total) by mouth 2 (two) times daily with meals. 180 tablet 3    lisinopril 5 MG Oral Tab Take 1 tablet (5 mg total) by mouth daily. 90 tablet 3    empagliflozin 10 MG Oral Tab Take 1 tablet (10 mg total) by mouth daily. 90 tablet 3    albuterol 108 (90 Base) MCG/ACT Inhalation Aero Soln Inhale 2 puffs into the lungs every 4 (four) hours as needed for Wheezing. 1 each 0    diazePAM 10 MG Oral Tab 1-2 pills 15 min before MRI (Patient not taking: Reported on 1/13/2025) 2 tablet 0    aspirin 81 MG Oral Tab EC Take 1 tablet (81 mg total) by mouth daily.        Past Medical History:    Depression    Diabetes (HCC)    Esophageal reflux    High blood pressure    High  cholesterol    Migraines    PONV (postoperative nausea and vomiting)    Sleep apnea    NO MACHINE/TREATMENT    Stroke (HCC)    TIA    Visual impairment    CONTACTS/GLASSES      Past Surgical History:   Procedure Laterality Date    Appendectomy  02/22/2024    Cholecystectomy  01/01/1991    Hysterectomy  01/01/2013    partial hysterectomy, has ovaries    Removal of ovary(s) Right 02/22/2024    RT ovary    Tubal ligation  18 years ago      Family History   Problem Relation Age of Onset    Hypertension Mother     Diabetes Father     Prostate Cancer Father 65    Cancer Paternal Grandmother       Social History     Socioeconomic History    Marital status:    Tobacco Use    Smoking status: Former     Types: Cigarettes    Smokeless tobacco: Former     Quit date: 12/29/2013   Vaping Use    Vaping status: Never Used   Substance and Sexual Activity    Alcohol use: Yes     Alcohol/week: 0.0 - 1.0 standard drinks of alcohol     Comment: SOCIAL    Drug use: No           REVIEW OF SYSTEMS:     Denies nause, fever, chills  No calf pain  Denies chest pain or SOB      EXAM:   There were no vitals taken for this visit.  GENERAL: well developed, well nourished, in no apparent distress  EXTREMITIES:   1. Integument: Normal skin temperature and turgor  2. Vascular: Dorsalis pedis two out of four bilateral and posterior tibial pulses two out of   four bilateral, capillary refill normal.   3. Musculoskeletal: All muscle groups are graded 5 out of 5 in the foot and ankle.  Pain with palpation to right medial calcaneal tubercle   4. Neurological: Normal sharp dull sensation; reflexes normal.    Bilateral barefoot skin diabetic exam is normal, visualized feet and the appearance is normal.  Bilateral monofilament/sensation of both feet is normal.  Pulsation pedal pulse exam of both lower legs/feet is normal as well.               ASSESSMENT AND PLAN:   Diagnoses and all orders for this visit:    Pain of right heel  -     Physical  Therapy Referral - [place] Locations  -     XR FOOT, COMPLETE (MIN 3 VIEWS), RIGHT (CPT=73630); Future    Plantar fasciitis  -     Physical Therapy Referral - [place] Locations  -     XR FOOT, COMPLETE (MIN 3 VIEWS), RIGHT (CPT=73630); Future        Plan:       -Patient examined, chart history reviewed.  -Discussed importance of proper pedal hygiene, regular foot checks, and tight glucose control.  -Advised patient to use a frozen water bottle while seated with socks on to roll on the plantar aspect of her foot.  -Discussed oral steroids versus a cortisone injection for right heel pain.  At this time I recommend a Medrol Dosepak and x-ray of right foot for further evaluation.  Patient to return to clinic in 1 month for follow-up if continued pain we can perform a cortisone injection  -Referral placed for physical therapy.  Advised patient to perform daily calf stretching 3-5 times a day.  -Ambulate with supportive shoes and inserts and avoid walking barefoot.  -Educated patient on acute signs of infection advised patient to seek immediate medical attention if symptoms arise.    RTC in 1 month    The patient indicates understanding of these issues and agrees to the plan.        Monse Campos DPM

## 2025-01-16 ENCOUNTER — APPOINTMENT (OUTPATIENT)
Dept: ENDOCRINOLOGY | Facility: HOSPITAL | Age: 55
End: 2025-01-16
Attending: NURSE PRACTITIONER
Payer: MEDICAID

## 2025-01-19 ENCOUNTER — OFFICE VISIT (OUTPATIENT)
Dept: FAMILY MEDICINE CLINIC | Facility: CLINIC | Age: 55
End: 2025-01-19
Payer: MEDICAID

## 2025-01-19 VITALS
DIASTOLIC BLOOD PRESSURE: 79 MMHG | BODY MASS INDEX: 33 KG/M2 | WEIGHT: 196.63 LBS | TEMPERATURE: 98 F | RESPIRATION RATE: 12 BRPM | HEART RATE: 64 BPM | SYSTOLIC BLOOD PRESSURE: 120 MMHG

## 2025-01-19 DIAGNOSIS — R42 VERTIGO: ICD-10-CM

## 2025-01-19 DIAGNOSIS — M79.671 PAIN OF RIGHT HEEL: ICD-10-CM

## 2025-01-19 DIAGNOSIS — M72.2 PLANTAR FASCIITIS: ICD-10-CM

## 2025-01-19 DIAGNOSIS — E11.9 TYPE 2 DIABETES MELLITUS WITHOUT COMPLICATION, WITHOUT LONG-TERM CURRENT USE OF INSULIN (HCC): Primary | ICD-10-CM

## 2025-01-19 LAB — HEMOGLOBIN A1C: 6.3 % (ref 4.3–5.6)

## 2025-01-19 PROCEDURE — 99214 OFFICE O/P EST MOD 30 MIN: CPT | Performed by: NURSE PRACTITIONER

## 2025-01-19 PROCEDURE — 83036 HEMOGLOBIN GLYCOSYLATED A1C: CPT | Performed by: NURSE PRACTITIONER

## 2025-01-19 RX ORDER — METHYLPREDNISOLONE 4 MG/1
TABLET ORAL
Qty: 21 EACH | Refills: 0 | Status: SHIPPED | OUTPATIENT
Start: 2025-01-19

## 2025-01-19 RX ORDER — MECLIZINE HYDROCHLORIDE 25 MG/1
25 TABLET ORAL 3 TIMES DAILY PRN
Qty: 30 TABLET | Refills: 1 | Status: SHIPPED | OUTPATIENT
Start: 2025-01-19

## 2025-01-19 NOTE — PROGRESS NOTES
HPI    Patient presents for diabetes follow up.  Diet efforts have improved.  Reports that blood sugars are dropping with metformin so is only taking once daily.      With concerns of feeling dizzy for the past few days.      Recently saw podiatry and was told that she was going to be given a medication for inflammation but reports that a prescription was never sent in.    Review of Systems   Musculoskeletal:         Right heel pain.     Neurological:  Positive for dizziness.   All other systems reviewed and are negative.       Vitals:    01/19/25 0933   BP: 120/79   Pulse: 64   Resp: 12   Temp: 97.8 °F (36.6 °C)   TempSrc: Oral   Weight: 196 lb 9.6 oz (89.2 kg)     Body mass index is 32.72 kg/m².    Health Maintenance   Topic Date Due    Diabetes Care Dilated Eye Exam  Never done    Colorectal Cancer Screening  Never done    Pneumococcal Vaccine: 50+ Years (1 of 2 - PCV) Never done    Zoster Vaccines (1 of 2) Never done    DTaP,Tdap,and Td Vaccines (2 - Td or Tdap) 09/30/2021    Influenza Vaccine (1) 10/01/2024    Mammogram  10/18/2024    Annual Depression Screening  01/01/2025    Diabetes Care: Microalb/Creat Ratio (Annual)  Never done    Diabetes Care A1C  02/19/2025    Annual Physical  11/19/2025    Diabetes Care: GFR  11/19/2025    Diabetes Care: Foot Exam (Annual)  Completed    Meningococcal B Vaccine  Aged Out       Past Medical History:    Depression    Diabetes (HCC)    Esophageal reflux    High blood pressure    High cholesterol    Migraines    PONV (postoperative nausea and vomiting)    Sleep apnea    NO MACHINE/TREATMENT    Stroke (HCC)    TIA    Visual impairment    CONTACTS/GLASSES       .  Past Surgical History:   Procedure Laterality Date    Appendectomy  02/22/2024    Cholecystectomy  01/01/1991    Hysterectomy  01/01/2013    partial hysterectomy, has ovaries    Removal of ovary(s) Right 02/22/2024    RT ovary    Tubal ligation  18 years ago       Family History   Problem Relation Age of Onset     Hypertension Mother     Diabetes Father     Prostate Cancer Father 65    Cancer Paternal Grandmother        Social History     Socioeconomic History    Marital status:      Spouse name: Not on file    Number of children: Not on file    Years of education: Not on file    Highest education level: Not on file   Occupational History    Not on file   Tobacco Use    Smoking status: Former     Types: Cigarettes    Smokeless tobacco: Former     Quit date: 12/29/2013   Vaping Use    Vaping status: Never Used   Substance and Sexual Activity    Alcohol use: Yes     Alcohol/week: 0.0 - 1.0 standard drinks of alcohol     Comment: SOCIAL    Drug use: No    Sexual activity: Not on file   Other Topics Concern    Not on file   Social History Narrative    Not on file     Social Drivers of Health     Financial Resource Strain: Low Risk  (8/12/2023)    Received from [facility], [facility] [employer]    Financial Resource Strain     In the past year, have you or any family members you live with been unable to get any of the following when it was really needed? Check all that apply.: None   Food Insecurity: Not At Risk (8/12/2023)    Received from [facility], [facility] [employer]    Food Insecurity     RETIRE How often in the past 12 months would you say you are worried or stressed about having enough money to buy nutritious meals? : Never   Transportation Needs: No Transportation Needs (8/12/2023)    Received from [facility], [facility], [facility] [employer], [facility]    PRAPARE - Transportation     In the past 12 months, has lack of transportation kept you from medical appointments or from getting medications?: No     In the past 12 months, has lack of transportation kept you from meetings, work, or from getting things needed for daily living?: No   Physical Activity: Not on file   Stress: Not on file   Social Connections: Low Risk  (8/12/2023)    Received from  [facility], [facility]    Social Connections     How often do you see or talk to people that you care about and feel close to? (For example: talking to friends on the phone, visiting friends or family, going to Judaism or club meetings): 5 or more times a week   Housing Stability: Not on file       Current Outpatient Medications   Medication Sig Dispense Refill    methylPREDNISolone (MEDROL) 4 MG Oral Tablet Therapy Pack As directed. 21 each 0    metFORMIN 500 MG Oral Tab Take 1 tablet (500 mg total) by mouth daily with breakfast. 90 tablet 1    meclizine 25 MG Oral Tab Take 1 tablet (25 mg total) by mouth 3 (three) times daily as needed. 30 tablet 1    omeprazole 20 MG Oral Capsule Delayed Release Take 1 capsule (20 mg total) by mouth every morning before breakfast.      Omega-3 Fatty Acids (OMEGA-3 FISH OIL OR) Take by mouth.      VITAMIN E OR       B COMPLEX-C ER OR Take by mouth.      lisinopril 5 MG Oral Tab Take 1 tablet (5 mg total) by mouth daily. 90 tablet 3    empagliflozin 10 MG Oral Tab Take 1 tablet (10 mg total) by mouth daily. 90 tablet 3    aspirin 81 MG Oral Tab EC Take 1 tablet (81 mg total) by mouth daily.         Allergies:  Allergies[1]    Physical Exam  Vitals and nursing note reviewed.   Constitutional:       General: She is not in acute distress.     Appearance: Normal appearance.   HENT:      Head: Normocephalic and atraumatic.   Cardiovascular:      Rate and Rhythm: Normal rate and regular rhythm.      Heart sounds: Normal heart sounds.   Pulmonary:      Effort: Pulmonary effort is normal. No respiratory distress.      Breath sounds: Normal breath sounds. No stridor. No wheezing, rhonchi or rales.   Chest:      Chest wall: No tenderness.   Neurological:      Mental Status: She is alert and oriented to person, place, and time.          Assessment and Plan:   Problem List Items Addressed This Visit    None  Visit Diagnoses       Type 2 diabetes mellitus without  complication, without long-term current use of insulin (HCC)    -  Primary    Relevant Medications    metFORMIN 500 MG Oral Tab    Other Relevant Orders    POC Glycohemoglobin [27981]    Pain of right heel        Relevant Medications    methylPREDNISolone (MEDROL) 4 MG Oral Tablet Therapy Pack    Plantar fasciitis        Relevant Medications    methylPREDNISolone (MEDROL) 4 MG Oral Tablet Therapy Pack    Vertigo        Relevant Medications    meclizine 25 MG Oral Tab           Point-of-care hemoglobin A1c 6.3 in the office today.  Decrease metformin to 500 mg once daily with meals.  Continue Jardiance 10 mg once daily.  Continue with healthy diet and exercise efforts.  Medrol dose pack sent in to take as directed.  Trial of meclizine 3 times daily as needed.  May trial over-the-counter antihistamines to decrease fluid buildup in bilateral ears.  Supportive care discussed.  Follow-up as needed.    Discussed plan of care with patient and patient is in agreement.  All questions answered. Patient to call with questions or concerns.    Encouraged to sign up for My Chart if not already registered.        [1]   Allergies  Allergen Reactions    Lexapro [Escitalopram] SWELLING and Tightness in Throat

## 2025-01-21 ENCOUNTER — ANESTHESIA EVENT (OUTPATIENT)
Dept: SURGERY | Facility: HOSPITAL | Age: 55
End: 2025-01-21
Payer: MEDICAID

## 2025-01-23 ENCOUNTER — APPOINTMENT (OUTPATIENT)
Dept: ENDOCRINOLOGY | Facility: HOSPITAL | Age: 55
End: 2025-01-23
Attending: NURSE PRACTITIONER
Payer: MEDICAID

## 2025-01-30 ENCOUNTER — ANESTHESIA (OUTPATIENT)
Dept: SURGERY | Facility: HOSPITAL | Age: 55
End: 2025-01-30
Payer: MEDICAID

## 2025-01-30 ENCOUNTER — HOSPITAL ENCOUNTER (OUTPATIENT)
Facility: HOSPITAL | Age: 55
Setting detail: HOSPITAL OUTPATIENT SURGERY
Discharge: HOME | End: 2025-01-30
Attending: ORTHOPAEDIC SURGERY | Admitting: ORTHOPAEDIC SURGERY
Payer: MEDICAID

## 2025-01-30 VITALS
HEIGHT: 65 IN | TEMPERATURE: 97 F | DIASTOLIC BLOOD PRESSURE: 64 MMHG | HEART RATE: 53 BPM | BODY MASS INDEX: 32.49 KG/M2 | WEIGHT: 195 LBS | SYSTOLIC BLOOD PRESSURE: 117 MMHG | RESPIRATION RATE: 16 BRPM | OXYGEN SATURATION: 98 %

## 2025-01-30 DIAGNOSIS — Z01.818 PRE-OP TESTING: Primary | ICD-10-CM

## 2025-01-30 DIAGNOSIS — G89.18 POST-OP PAIN: ICD-10-CM

## 2025-01-30 LAB
GLUCOSE BLD-MCNC: 114 MG/DL (ref 70–99)
GLUCOSE BLD-MCNC: 118 MG/DL (ref 70–99)

## 2025-01-30 PROCEDURE — 01N54ZZ RELEASE MEDIAN NERVE, PERCUTANEOUS ENDOSCOPIC APPROACH: ICD-10-PCS | Performed by: ORTHOPAEDIC SURGERY

## 2025-01-30 PROCEDURE — 82962 GLUCOSE BLOOD TEST: CPT

## 2025-01-30 RX ORDER — TRAMADOL HYDROCHLORIDE 50 MG/1
50 TABLET ORAL EVERY 6 HOURS PRN
Qty: 5 TABLET | Refills: 0 | Status: SHIPPED | OUTPATIENT
Start: 2025-01-30

## 2025-01-30 RX ORDER — HYDROMORPHONE HYDROCHLORIDE 1 MG/ML
0.6 INJECTION, SOLUTION INTRAMUSCULAR; INTRAVENOUS; SUBCUTANEOUS EVERY 5 MIN PRN
Status: DISCONTINUED | OUTPATIENT
Start: 2025-01-30 | End: 2025-01-30

## 2025-01-30 RX ORDER — NALOXONE HYDROCHLORIDE 0.4 MG/ML
0.08 INJECTION, SOLUTION INTRAMUSCULAR; INTRAVENOUS; SUBCUTANEOUS AS NEEDED
Status: DISCONTINUED | OUTPATIENT
Start: 2025-01-30 | End: 2025-01-30

## 2025-01-30 RX ORDER — SODIUM CHLORIDE, SODIUM LACTATE, POTASSIUM CHLORIDE, CALCIUM CHLORIDE 600; 310; 30; 20 MG/100ML; MG/100ML; MG/100ML; MG/100ML
INJECTION, SOLUTION INTRAVENOUS CONTINUOUS
Status: DISCONTINUED | OUTPATIENT
Start: 2025-01-30 | End: 2025-01-30

## 2025-01-30 RX ORDER — NICOTINE POLACRILEX 4 MG
30 LOZENGE BUCCAL
Status: DISCONTINUED | OUTPATIENT
Start: 2025-01-30 | End: 2025-01-30 | Stop reason: HOSPADM

## 2025-01-30 RX ORDER — PROCHLORPERAZINE EDISYLATE 5 MG/ML
5 INJECTION INTRAMUSCULAR; INTRAVENOUS EVERY 8 HOURS PRN
Status: DISCONTINUED | OUTPATIENT
Start: 2025-01-30 | End: 2025-01-30

## 2025-01-30 RX ORDER — LIDOCAINE HYDROCHLORIDE 10 MG/ML
INJECTION, SOLUTION EPIDURAL; INFILTRATION; INTRACAUDAL; PERINEURAL AS NEEDED
Status: DISCONTINUED | OUTPATIENT
Start: 2025-01-30 | End: 2025-01-30 | Stop reason: SURG

## 2025-01-30 RX ORDER — HYDROCODONE BITARTRATE AND ACETAMINOPHEN 5; 325 MG/1; MG/1
1 TABLET ORAL ONCE AS NEEDED
Status: DISCONTINUED | OUTPATIENT
Start: 2025-01-30 | End: 2025-01-30

## 2025-01-30 RX ORDER — DEXTROSE MONOHYDRATE 25 G/50ML
50 INJECTION, SOLUTION INTRAVENOUS
Status: DISCONTINUED | OUTPATIENT
Start: 2025-01-30 | End: 2025-01-30 | Stop reason: HOSPADM

## 2025-01-30 RX ORDER — ACETAMINOPHEN 500 MG
1000 TABLET ORAL ONCE AS NEEDED
Status: DISCONTINUED | OUTPATIENT
Start: 2025-01-30 | End: 2025-01-30

## 2025-01-30 RX ORDER — HYDROCODONE BITARTRATE AND ACETAMINOPHEN 5; 325 MG/1; MG/1
2 TABLET ORAL ONCE AS NEEDED
Status: DISCONTINUED | OUTPATIENT
Start: 2025-01-30 | End: 2025-01-30

## 2025-01-30 RX ORDER — ACETAMINOPHEN 500 MG
1000 TABLET ORAL ONCE
Status: DISCONTINUED | OUTPATIENT
Start: 2025-01-30 | End: 2025-01-30 | Stop reason: HOSPADM

## 2025-01-30 RX ORDER — LABETALOL HYDROCHLORIDE 5 MG/ML
5 INJECTION, SOLUTION INTRAVENOUS EVERY 5 MIN PRN
Status: DISCONTINUED | OUTPATIENT
Start: 2025-01-30 | End: 2025-01-30

## 2025-01-30 RX ORDER — HYDROMORPHONE HYDROCHLORIDE 1 MG/ML
0.4 INJECTION, SOLUTION INTRAMUSCULAR; INTRAVENOUS; SUBCUTANEOUS EVERY 5 MIN PRN
Status: DISCONTINUED | OUTPATIENT
Start: 2025-01-30 | End: 2025-01-30

## 2025-01-30 RX ORDER — ONDANSETRON 2 MG/ML
4 INJECTION INTRAMUSCULAR; INTRAVENOUS EVERY 6 HOURS PRN
Status: DISCONTINUED | OUTPATIENT
Start: 2025-01-30 | End: 2025-01-30

## 2025-01-30 RX ORDER — HYDROMORPHONE HYDROCHLORIDE 1 MG/ML
0.2 INJECTION, SOLUTION INTRAMUSCULAR; INTRAVENOUS; SUBCUTANEOUS EVERY 5 MIN PRN
Status: DISCONTINUED | OUTPATIENT
Start: 2025-01-30 | End: 2025-01-30

## 2025-01-30 RX ORDER — MIDAZOLAM HYDROCHLORIDE 1 MG/ML
INJECTION INTRAMUSCULAR; INTRAVENOUS AS NEEDED
Status: DISCONTINUED | OUTPATIENT
Start: 2025-01-30 | End: 2025-01-30 | Stop reason: SURG

## 2025-01-30 RX ORDER — SCOPOLAMINE 1 MG/3D
1 PATCH, EXTENDED RELEASE TRANSDERMAL ONCE
Status: DISCONTINUED | OUTPATIENT
Start: 2025-01-30 | End: 2025-01-30 | Stop reason: HOSPADM

## 2025-01-30 RX ORDER — NICOTINE POLACRILEX 4 MG
15 LOZENGE BUCCAL
Status: DISCONTINUED | OUTPATIENT
Start: 2025-01-30 | End: 2025-01-30 | Stop reason: HOSPADM

## 2025-01-30 RX ORDER — MEPERIDINE HYDROCHLORIDE 25 MG/ML
12.5 INJECTION INTRAMUSCULAR; INTRAVENOUS; SUBCUTANEOUS AS NEEDED
Status: DISCONTINUED | OUTPATIENT
Start: 2025-01-30 | End: 2025-01-30

## 2025-01-30 RX ADMIN — SODIUM CHLORIDE, SODIUM LACTATE, POTASSIUM CHLORIDE, CALCIUM CHLORIDE: 600; 310; 30; 20 INJECTION, SOLUTION INTRAVENOUS at 07:10:00

## 2025-01-30 RX ADMIN — LIDOCAINE HYDROCHLORIDE 25 MG: 10 INJECTION, SOLUTION EPIDURAL; INFILTRATION; INTRACAUDAL; PERINEURAL at 07:16:00

## 2025-01-30 RX ADMIN — MIDAZOLAM HYDROCHLORIDE 2 MG: 1 INJECTION INTRAMUSCULAR; INTRAVENOUS at 07:16:00

## 2025-01-30 RX ADMIN — SODIUM CHLORIDE, SODIUM LACTATE, POTASSIUM CHLORIDE, CALCIUM CHLORIDE: 600; 310; 30; 20 INJECTION, SOLUTION INTRAVENOUS at 07:35:00

## 2025-01-30 NOTE — ANESTHESIA PREPROCEDURE EVALUATION
PRE-OP EVALUATION    Patient Name: Lissette Block    Admit Diagnosis: Carpal tunnel syndrome of right wrist [G56.01]    Pre-op Diagnosis: Carpal tunnel syndrome of right wrist [G56.01]    Right endoscopic carpal tunnel release, possible open    Anesthesia Procedure: Right endoscopic carpal tunnel release, possible open (Right)    Surgeons and Role:     * Jose Campo MD - Primary    Pre-op vitals reviewed.  Temp: 97.8 °F (36.6 °C)  Pulse: 60  Resp: 16  BP: 124/76  SpO2: 100 %  Body mass index is 32.45 kg/m².    Current medications reviewed.  Hospital Medications:   acetaminophen (Tylenol Extra Strength) tab 1,000 mg  1,000 mg Oral Once    scopolamine (Transderm-Scop) 1 MG/3DAYS patch 1 patch  1 patch Transdermal Once    glucose (Dex4) 15 GM/59ML oral liquid 15 g  15 g Oral Q15 Min PRN    Or    glucose (Glutose) 40% oral gel 15 g  15 g Oral Q15 Min PRN    Or    glucose-vitamin C (Dex-4) chewable tab 4 tablet  4 tablet Oral Q15 Min PRN    Or    dextrose 50% injection 50 mL  50 mL Intravenous Q15 Min PRN    Or    glucose (Dex4) 15 GM/59ML oral liquid 30 g  30 g Oral Q15 Min PRN    Or    glucose (Glutose) 40% oral gel 30 g  30 g Oral Q15 Min PRN    Or    glucose-vitamin C (Dex-4) chewable tab 8 tablet  8 tablet Oral Q15 Min PRN    lactated ringers infusion   Intravenous Continuous    ceFAZolin (Ancef) 2g in 10mL IV syringe premix  2 g Intravenous Once    bupivacaine/lidocaine w Epi/sodium bicarbonate local mixture 10mL syringe   Infiltration Once (Intra-Op)       Outpatient Medications:   Prescriptions Prior to Admission[1]    Allergies: Lexapro [escitalopram]      Anesthesia Evaluation  Patient Active Problem List   Diagnosis    Microscopic hematuria    Dysuria    OAB (overactive bladder)    Right wrist tendonitis    Excessive or frequent menstruation    IUD mechanical complication    Heel pain, bilateral    Posterior tibial tendon dysfunction, left    TIA (transient ischemic attack)        Past Medical  History:    Depression    Diabetes (HCC)    Esophageal reflux    High blood pressure    High cholesterol    Migraines    PONV (postoperative nausea and vomiting)    Sleep apnea    NO MACHINE/TREATMENT    Stroke (HCC)    TIA    Visual impairment    CONTACTS/GLASSES          Past Surgical History:   Procedure Laterality Date    Appendectomy  02/22/2024    Cholecystectomy  01/01/1991    Hysterectomy  01/01/2013    partial hysterectomy, has ovaries    Removal of ovary(s) Right 02/22/2024    RT ovary    Tubal ligation  18 years ago     Social History     Socioeconomic History    Marital status:    Tobacco Use    Smoking status: Former     Types: Cigarettes    Smokeless tobacco: Former     Quit date: 12/29/2013   Vaping Use    Vaping status: Never Used   Substance and Sexual Activity    Alcohol use: Yes     Alcohol/week: 0.0 - 1.0 standard drinks of alcohol     Comment: SOCIAL    Drug use: No     History   Drug Use No     Available pre-op labs reviewed.  Lab Results   Component Value Date    WBC 8.0 11/19/2024    RBC 5.05 11/19/2024    HGB 14.9 11/19/2024    HCT 43.6 11/19/2024    MCV 86.3 11/19/2024    MCH 29.5 11/19/2024    MCHC 34.2 11/19/2024    RDW 12.1 11/19/2024    .0 11/19/2024     Lab Results   Component Value Date     11/19/2024    K 4.4 11/19/2024     11/19/2024    CO2 25.0 11/19/2024    BUN 11 11/19/2024    CREATSERUM 0.85 11/19/2024     (H) 11/19/2024    CA 10.2 11/19/2024            Airway      Mallampati: II  Mouth opening: >3 FB  TM distance: 4 - 6 cm  Neck ROM: full Cardiovascular      Rhythm: regular  Rate: normal     Dental    Dentition appears grossly intact         Pulmonary    Pulmonary exam normal.  Breath sounds clear to auscultation bilaterally.               Other findings              ASA: 2   Plan: MAC  NPO status verified and     Post-procedure pain management plan discussed with surgeon and patient.    Comment: Plan is MAC anesthesia, which likely will include  deep sedation.  Implied that memory of procedure is unlikely although intraop recall, if it occurs, may be a reasonable and comfortable experience with this anesthetic.  Aware that general anesthesia is not intended though deep sedation may include brief moments of general anesthesia.   Questions answered. Accepts. The consent was signed without further questions.   Plan/risks discussed with: patient  Use of blood product(s) discussed with: patient    Consented to blood products.          Present on Admission:  **None**             [1]   Medications Prior to Admission   Medication Sig Dispense Refill Last Dose/Taking    omeprazole 20 MG Oral Capsule Delayed Release Take 1 capsule (20 mg total) by mouth every morning before breakfast.   1/29/2025 Morning    Omega-3 Fatty Acids (OMEGA-3 FISH OIL OR) Take by mouth.   Past Week    VITAMIN E OR    Past Week    B COMPLEX-C ER OR Take by mouth.   Past Week    lisinopril 5 MG Oral Tab Take 1 tablet (5 mg total) by mouth daily. 90 tablet 3 Past Week    empagliflozin 10 MG Oral Tab Take 1 tablet (10 mg total) by mouth daily. 90 tablet 3 1/28/2025    albuterol 108 (90 Base) MCG/ACT Inhalation Aero Soln Inhale 2 puffs into the lungs every 4 (four) hours as needed for Wheezing. 1 each 0 Past Week    aspirin 81 MG Oral Tab EC Take 1 tablet (81 mg total) by mouth daily.   Past Month    methylPREDNISolone (MEDROL) 4 MG Oral Tablet Therapy Pack As directed. 21 each 0 Unknown    metFORMIN 500 MG Oral Tab Take 1 tablet (500 mg total) by mouth daily with breakfast. 90 tablet 1 1/28/2025    meclizine 25 MG Oral Tab Take 1 tablet (25 mg total) by mouth 3 (three) times daily as needed. 30 tablet 1 Unknown

## 2025-01-30 NOTE — DISCHARGE INSTRUCTIONS
What to expect after a carpal tunnel release     Immediately after your surgery, keep your hand elevated (fingers up pointing to ceiling) as much as possible for the first 7 days. Icing is good too, but not as important as elevation. It is normal for your fingers to swell and have discoloration (bruising) appear a couple days after surgery into your fingers or elbow. You should begin using your hand for light activities - writing, typing, dressing and feeding yourself immediately. Move your fingers and make a fist ten times every hour while awake. Refrain from lifting greater than 2 pounds for the first 2 weeks after your surgery to allow the surgical site to heal completely.    Patient’s pain level is variable after this procedure most patients average a 2-3 out of 10 on a pain scale.. After the numbness wears off you can take pain medication as needed, including an anti-inflammatory (i.e. Aleve, Motrin, ibuprofen) as long as you don’t have any contraindications to taking these.    Keep the clear Tegaderm dressing on for 5 days. You can get it wet right away when washing hands or showering. You can continue to let it get wet after the clear dressing is removed. There are steri strips underneath that can get wet and will fall off on their own. Please refrain from soaking your hand in a bath or dishwater for 3 weeks following your surgery.    You will see Ayse Hobson PA-C or Dr Campo at your post op visit approximately 7-14 days after surgery.  An appointment was made for you but if you do not have an appointment or that time does not work please call the office     If you have any questions or concerns please reach out. Our office number is: [phone].

## 2025-01-30 NOTE — OPERATIVE REPORT
Operative Report     Patient Name: Lissette Block    1/30/2025    Preoperative Diagnosis:     Carpal tunnel syndrome of right wrist [G56.01]    Postoperative Diagnosis:     Same as above    Surgeons and Role:     * Jose Campo MD - Primary     Assistant: Ayse Hobson PA-C    A PA was needed for the successful completion of this case. She was essential for the proper positioning of patient, manipulation of instruments, proper exposure, manipulation of soft tissue, and wound closure.    Procedures:     Right endoscopic carpal tunnel release (CPT 58269)    Antibiotics: Ancef 2g    Surgical Findings: Normal Anatomy     Anesthesia: MAC + Local    Complications: None    Condition: Stable    Estimated Blood Loss: 1mL    Indications:  54 year old female with EMG/NCV confirmed right carpal tunnel syndrome that failed non-surgical management. Patient consented to an endoscopic carpal tunnel release possible open having understood the risks associated with surgery, expected outcome, time to recovery and the need for possible rehab.  Risks that were discussed but not limited to infection, nerve injury, tendon injury, artery injury, need for additional surgery, and no improvements of present symptoms.      Procedure:  Patient was met in the preoperative holding area where consent was verified, laterality was marked with the surgeon's initials, and the H&P was updated. Local anesthetic was injected. Patient was brought to the operating room on a transport cart. Patient was then transferred onto the operating room table and placed in supine position with an arm table and with bony prominences well-padded.  SCDs were placed.  Antibiotics were fully infused. An upper arm tourniquet was placed and the limb was exsanguinated using Esmarch bandage. The arm was then prepped and draped in the usual sterile fashion. A surgical timeout was performed.Tourniquet was raised to 250mmHg.    A transverse incision through the  dermis was made 5mm proximal to the distal volar wrist crease just ulnar to the palmaris longus using a 15 blade.  Adson's and Dye scissors was used to dissect through the subcutaneous tissue onto the antebrachial fascia.  A distally based 1 cm wide rectangular flap was elevated using a [place] blade.  The flap was retracted distally and volarly allowing access to the carpal tunnel.  The undersurface of the transverse carpal ligament was accessed, cleaned, and dilated.  After assessing the width of the ligament, the microaire endoscopic apparatus with camera was inserted into the carpal tunnel.  Under camera magnification with direct visualization of the undersurface of the transverse carpal ligament, the blade was engaged at the distal extents of the ligament and the release of the ligament was carried out distal to proximal.  I verified the release with the endoscopy camera noting divergent edges of the transverse carpal ligament.  I also physically verified with a tenosynovium elevator complete release of the transverse carpal ligament.      The tourniquet was then lowered and bipolar cautery was used to achieve hemostasis.       Closure:  The incision was closed using 5-0 Monocryl in a buried simple interrupted fashion.  Skin glue and Steri-Strips were placed.     Dressing/Splint:  Tegaderm with a pad was applied over the endoscopic carpal tunnel incision.     Post Operative:  Patient was woken up from anesthesia and taken to PACU for further recovery and discharge home.    Jose Campo MD   Hand, Wrist, & Elbow Surgery  [email or web address]  t: [phone]  f: [phone]

## 2025-01-30 NOTE — H&P
Clinic Note      Assessment/Plan:  54 year old female    Right carpal tunnel syndrome -EMG/NCV negative-reports 75% improvement in symptoms for a few days but symptoms recurred thereafter.  I do think that carpal tunnel release will likely provide pain relief that she is looking for however I cannot 100% guarantee it.  She would like to think about surgery and will reach back out if she would like to schedule surgery at some point in the future.  Right thumb CMC arthritis-discussed nonsurgical treatment options which include turmeric/curcumin, CBD oil, Voltaren gel 1%, bracing, NSAIDs, and CMC bracing.    Follow Up: As needed      Diagnostic Studies:     EMG/NCV: Negative for peripheral neuropathy     Physical Exam:    /76 (BP Location: Right arm)   Pulse 60   Temp 97.8 °F (36.6 °C) (Oral)   Resp 16   Ht 5' 5\" (1.651 m)   Wt 195 lb (88.5 kg)   SpO2 100%   BMI 32.45 kg/m²     Constitutional: NAD. AOx3. Well-developed and Well-nourished.   Psychiatric: Normal mood/ affect/ behavior. Judgment and thought content normal.     Right Upper Extremity:   Inspection: Skin Intact. No skin lesions. No gross deformity.   Palpation: Tenderness palpation of the CMC joint, and volar distal forearm   Motion: Elbow: normal bilateral symmetric ext/flex  Wrist: normal bilateral symmetric ext/flex/sup/pro  Finger: full composite fist   Vascular 2+ radial pulse       Median Nerve Exam Right    Phdurkan +   Sensation normal   PCBr Sensation normal   APB Weakness No   Thenar Atrophy No     Ulnar Nerve Exam Right    Sensation normal   1st ELI Weakness No   Hypothenar Atrophy No     Radial Nerve Exam  Right    Radial Sensory normal       CC: Right hand pain    HPI: This 54 year old RHD female presents with pain in the right distal forearm.  Patient reports moderate to severe pain.  She characterizes the pain as throbbing aching sharp shooting and burning in quality.  Does go into the median nerve distribution.  When she lifts  heavy things she feels like she loses strength.  She also reports pain at the base of the thumb joint.  She has tried bracing.  Her symptoms do wake her up at nighttime.    Insert interval history: Patient reports 75% improvement after the corticosteroid injection however only lasted a few days.      Past Medical History:    Depression    Diabetes (HCC)    Esophageal reflux    High blood pressure    High cholesterol    Migraines    PONV (postoperative nausea and vomiting)    Sleep apnea    NO MACHINE/TREATMENT    Stroke (HCC)    TIA    Visual impairment    CONTACTS/GLASSES     Past Surgical History:   Procedure Laterality Date    Appendectomy  02/22/2024    Cholecystectomy  01/01/1991    Hysterectomy  01/01/2013    partial hysterectomy, has ovaries    Removal of ovary(s) Right 02/22/2024    RT ovary    Tubal ligation  18 years ago     No current outpatient medications on file.     Allergies   Allergen Reactions    Lexapro [Escitalopram] SWELLING and Tightness in Throat     Family History   Problem Relation Age of Onset    Hypertension Mother     Diabetes Father     Prostate Cancer Father 65    Cancer Paternal Grandmother      Social History     Occupational History    Not on file   Tobacco Use    Smoking status: Former     Types: Cigarettes    Smokeless tobacco: Former     Quit date: 12/29/2013   Vaping Use    Vaping status: Never Used   Substance and Sexual Activity    Alcohol use: Yes     Alcohol/week: 0.0 - 1.0 standard drinks of alcohol     Comment: SOCIAL    Drug use: No    Sexual activity: Not on file        Review of Systems (negative unless bolded):  General: fevers, chills, fatigue  CV:  chest pain, palpitations, leg swelling  Msk: bodyaches, neck pain, neck stiffness  Skin: rashes, open wounds, nonhealing ulcers  Hem: bleeds easily, bruise easily, immunocompromised  Neuro: dizziness, light headedness, headaches  Psych: anxious, depressed, anger issues    Jose Campo MD   Hand, Wrist, & Elbow  Surgery  [email or web address]  t: [phone]  f: [phone]

## 2025-01-30 NOTE — ANESTHESIA POSTPROCEDURE EVALUATION
[facility]    Lissette Block Patient Status:  Hospital Outpatient Surgery   Age/Gender 54 year old female MRN [id number]   Location [facility] SURGERY Attending Jose Campo MD   Hosp Day # 0 PCP PHYSICIAN NONSTAFF       Anesthesia Post-op Note    Right endoscopic carpal tunnel release    Procedure Summary       Date: 01/30/25 Room / Location:  MAIN OR 06 /  MAIN OR    Anesthesia Start: 0710 Anesthesia Stop:     Procedure: Right endoscopic carpal tunnel release (Right: Hand) Diagnosis:       Carpal tunnel syndrome of right wrist      (Carpal tunnel syndrome of right wrist [G56.01])    Surgeons: Jose Campo MD Anesthesiologist: Desmond Mora MD    Anesthesia Type: MAC ASA Status: 2            Anesthesia Type: MAC    Vitals Value Taken Time   BP 94/62 01/30/25 0738   Temp 97.8 01/30/25 0738   Pulse 88 01/30/25 0738   Resp 16 01/30/25 0738   SpO2 96 01/30/25 0738           Patient Location: Same Day Surgery    Anesthesia Type: MAC    Airway Patency: patent    Postop Pain Control: adequate    Mental Status: preanesthetic baseline    Nausea/Vomiting: none    Cardiopulmonary/Hydration status: stable euvolemic    Complications: no apparent anesthesia related complications    Postop vital signs: stable    Dental Exam: Unchanged from Preop    Patient to be discharged from PACU when criteria met.

## 2025-02-12 ENCOUNTER — TELEPHONE (OUTPATIENT)
Dept: ORTHOPEDICS CLINIC | Facility: CLINIC | Age: 55
End: 2025-02-12

## 2025-02-12 ENCOUNTER — OFFICE VISIT (OUTPATIENT)
Dept: ORTHOPEDICS CLINIC | Facility: CLINIC | Age: 55
End: 2025-02-12
Payer: MEDICAID

## 2025-02-12 VITALS — BODY MASS INDEX: 32.49 KG/M2 | WEIGHT: 195 LBS | HEIGHT: 65 IN

## 2025-02-12 DIAGNOSIS — G56.01 CARPAL TUNNEL SYNDROME OF RIGHT WRIST: Primary | ICD-10-CM

## 2025-02-12 PROCEDURE — 99024 POSTOP FOLLOW-UP VISIT: CPT | Performed by: PHYSICIAN ASSISTANT

## 2025-02-12 NOTE — PROGRESS NOTES
Clinic Note      Assessment/Plan:  54 year old female    Right carpal tunnel syndrome -EMG/NCV negative-improvement with diagnostic injection-status post carpal tunnel release 1/30/2025-numbness and tingling is resolved.  She can progress activities as tolerated.  We discussed scar massage.  She does have some pillar pain which should improve with time.  All of her questions were answered  Right thumb CMC arthritis-discussed nonsurgical treatment options which include turmeric/curcumin, CBD oil, Voltaren gel 1%, bracing, NSAIDs, and CMC bracing.    Follow Up: 5 weeks      Diagnostic Studies:     EMG/NCV: Negative for peripheral neuropathy     Physical Exam:    Ht 5' 5\" (1.651 m)   Wt 195 lb (88.5 kg)   BMI 32.45 kg/m²     Constitutional: NAD. AOx3. Well-developed and Well-nourished.   Psychiatric: Normal mood/ affect/ behavior. Judgment and thought content normal.     Right Upper Extremity:   Inspection: Incisions healing well with no signs of infection   Palpation: Tenderness palpation of the CMC joint, and volar distal forearm   Motion: Elbow: normal bilateral symmetric ext/flex  Wrist: normal bilateral symmetric ext/flex/sup/pro  Finger: full composite fist   Vascular 2+ radial pulse       Median Nerve Exam Right    Phdurkan +   Sensation normal   PCBr Sensation normal   APB Weakness No   Thenar Atrophy No     Ulnar Nerve Exam Right    Sensation normal   1st ELI Weakness No   Hypothenar Atrophy No     Radial Nerve Exam  Right    Radial Sensory normal       CC: Right hand pain    HPI: This 54 year old RHD female presents with pain in the right distal forearm.  Patient reports moderate to severe pain.  She characterizes the pain as throbbing aching sharp shooting and burning in quality.  Does go into the median nerve distribution.  When she lifts heavy things she feels like she loses strength.  She also reports pain at the base of the thumb joint.  She has tried bracing.  Her symptoms do wake her up at  nighttime.    Insert interval history: Patient underwent carpal tunnel eyezin her numbness and tingling is resolved.      Past Medical History:    Depression    Diabetes (HCC)    Esophageal reflux    High blood pressure    High cholesterol    Migraines    PONV (postoperative nausea and vomiting)    Sleep apnea    NO MACHINE/TREATMENT    Stroke (HCC)    TIA    Visual impairment    CONTACTS/GLASSES     Past Surgical History:   Procedure Laterality Date    Appendectomy  02/22/2024    Cholecystectomy  01/01/1991    Hysterectomy  01/01/2013    partial hysterectomy, has ovaries    Removal of ovary(s) Right 02/22/2024    RT ovary    Tubal ligation  18 years ago     Current Outpatient Medications   Medication Sig Dispense Refill    methylPREDNISolone (MEDROL) 4 MG Oral Tablet Therapy Pack As directed. 21 each 0    metFORMIN 500 MG Oral Tab Take 1 tablet (500 mg total) by mouth daily with breakfast. 90 tablet 1    meclizine 25 MG Oral Tab Take 1 tablet (25 mg total) by mouth 3 (three) times daily as needed. 30 tablet 1    omeprazole 20 MG Oral Capsule Delayed Release Take 1 capsule (20 mg total) by mouth every morning before breakfast.      Omega-3 Fatty Acids (OMEGA-3 FISH OIL OR) Take by mouth.      VITAMIN E OR       B COMPLEX-C ER OR Take by mouth.      lisinopril 5 MG Oral Tab Take 1 tablet (5 mg total) by mouth daily. 90 tablet 3    empagliflozin 10 MG Oral Tab Take 1 tablet (10 mg total) by mouth daily. 90 tablet 3    aspirin 81 MG Oral Tab EC Take 1 tablet (81 mg total) by mouth daily.      traMADol 50 MG Oral Tab Take 1 tablet (50 mg total) by mouth every 6 (six) hours as needed for Pain. (Patient not taking: Reported on 2/12/2025) 5 tablet 0     Allergies   Allergen Reactions    Lexapro [Escitalopram] SWELLING and Tightness in Throat     Family History   Problem Relation Age of Onset    Hypertension Mother     Diabetes Father     Prostate Cancer Father 65    Cancer Paternal Grandmother      Social History      Occupational History    Not on file   Tobacco Use    Smoking status: Former     Types: Cigarettes    Smokeless tobacco: Former     Quit date: 12/29/2013   Vaping Use    Vaping status: Never Used   Substance and Sexual Activity    Alcohol use: Yes     Alcohol/week: 0.0 - 1.0 standard drinks of alcohol     Comment: SOCIAL    Drug use: No    Sexual activity: Not on file        Review of Systems (negative unless bolded):  General: fevers, chills, fatigue  CV:  chest pain, palpitations, leg swelling  Msk: bodyaches, neck pain, neck stiffness  Skin: rashes, open wounds, nonhealing ulcers  Hem: bleeds easily, bruise easily, immunocompromised  Neuro: dizziness, light headedness, headaches  Psych: anxious, depressed, anger issues  Ayse Hobson PA-C  Hand, Wrist, & Elbow Surgery  Physician Assistant to Dr. Jose [email or web address]  t: [phone]  f: [phone]

## 2025-02-19 ENCOUNTER — HOSPITAL ENCOUNTER (OUTPATIENT)
Dept: MAMMOGRAPHY | Facility: HOSPITAL | Age: 55
Discharge: HOME | End: 2025-02-19
Attending: NURSE PRACTITIONER
Payer: MEDICAID

## 2025-02-19 DIAGNOSIS — Z00.00 WELL ADULT EXAM: ICD-10-CM

## 2025-02-19 DIAGNOSIS — Z12.31 ENCOUNTER FOR SCREENING MAMMOGRAM FOR MALIGNANT NEOPLASM OF BREAST: ICD-10-CM

## 2025-02-19 PROCEDURE — 77067 SCR MAMMO BI INCL CAD: CPT | Performed by: NURSE PRACTITIONER

## 2025-02-19 PROCEDURE — 77063 BREAST TOMOSYNTHESIS BI: CPT | Performed by: NURSE PRACTITIONER

## 2025-02-24 ENCOUNTER — PATIENT MESSAGE (OUTPATIENT)
Dept: ORTHOPEDICS CLINIC | Facility: CLINIC | Age: 55
End: 2025-02-24

## 2025-03-10 ENCOUNTER — PATIENT MESSAGE (OUTPATIENT)
Dept: NEUROLOGY | Facility: CLINIC | Age: 55
End: 2025-03-10

## 2025-03-10 ENCOUNTER — PATIENT MESSAGE (OUTPATIENT)
Dept: ORTHOPEDICS CLINIC | Facility: CLINIC | Age: 55
End: 2025-03-10

## 2025-03-10 NOTE — TELEPHONE ENCOUNTER
Letter is pended. Please add in patients restrictions and sign. Once signed it will go to patients mychart. Thank you!

## 2025-03-19 ENCOUNTER — OFFICE VISIT (OUTPATIENT)
Dept: ORTHOPEDICS CLINIC | Facility: CLINIC | Age: 55
End: 2025-03-19
Payer: MEDICAID

## 2025-03-19 VITALS — WEIGHT: 195 LBS | BODY MASS INDEX: 32.49 KG/M2 | HEIGHT: 65 IN

## 2025-03-19 DIAGNOSIS — M77.8 WRIST TENDONITIS: ICD-10-CM

## 2025-03-19 DIAGNOSIS — G56.01 CARPAL TUNNEL SYNDROME OF RIGHT WRIST: Primary | ICD-10-CM

## 2025-03-19 PROCEDURE — 99213 OFFICE O/P EST LOW 20 MIN: CPT | Performed by: ORTHOPAEDIC SURGERY

## 2025-03-19 RX ORDER — MELOXICAM 15 MG/1
15 TABLET ORAL DAILY
Qty: 30 TABLET | Refills: 0 | Status: SHIPPED | OUTPATIENT
Start: 2025-03-19

## 2025-03-19 NOTE — PROGRESS NOTES
Clinic Note      Assessment/Plan:  54 year old female    Right carpal tunnel syndrome -EMG/NCV negative-improvement with diagnostic injection-status post carpal tunnel release 1/30/2025-mild pillar pain and swelling which will continue to improve over time.  Right wrist FCU and ECU tendinitis-recommend bracing and a short course of meloxicam 15 mg for 10 to 14 days and can continued up to  Right thumb CMC arthritis-discussed nonsurgical treatment options which include turmeric/curcumin, CBD oil, Voltaren gel 1%, bracing, NSAIDs, and CMC bracing.    Follow Up: 6 weeks, okay to cancel      Diagnostic Studies:     EMG/NCV: Negative for peripheral neuropathy     Physical Exam:    Ht 5' 5\" (1.651 m)   Wt 195 lb (88.5 kg)   BMI 32.45 kg/m²     Constitutional: NAD. AOx3. Well-developed and Well-nourished.   Psychiatric: Normal mood/ affect/ behavior. Judgment and thought content normal.     Right Upper Extremity:   Inspection: Incisions well-healed with trace amount of edema   Palpation: Tenderness over the carpal tunnel consistent with pillar pain.  Tenderness to palpation of FCU and ECU tendons   Motion: Elbow: normal bilateral symmetric ext/flex  Wrist: normal bilateral symmetric ext/flex/sup/pro  Finger: full composite fist   Vascular 2+ radial pulse       Median Nerve Exam Right    Phdurkan Negative   Sensation normal   PCBr Sensation normal   APB Weakness No   Thenar Atrophy No     Ulnar Nerve Exam Right    Sensation normal   1st ELI Weakness No   Hypothenar Atrophy No     Radial Nerve Exam  Right    Radial Sensory normal       CC: Right hand pain    HPI: This 54 year old RHD female presents with pain in the right distal forearm.  Patient reports moderate to severe pain.  She characterizes the pain as throbbing aching sharp shooting and burning in quality.  Does go into the median nerve distribution.  When she lifts heavy things she feels like she loses strength.  She also reports pain at the base of the thumb  joint.  She has tried bracing.  Her symptoms do wake her up at nighttime.    Interval Hx (3/19/2025): Patient reports no recurrence of preoperative symptoms.  She does have some soreness and sensitivity in the palm.  She also notes some pain and discomfort along the ECU and FCU tendons.      Past Medical History:    Depression    Diabetes (HCC)    Esophageal reflux    High blood pressure    High cholesterol    Migraines    PONV (postoperative nausea and vomiting)    Sleep apnea    NO MACHINE/TREATMENT    Stroke (HCC)    TIA    Visual impairment    CONTACTS/GLASSES     Past Surgical History:   Procedure Laterality Date    Appendectomy  02/22/2024    Cholecystectomy  01/01/1991    Hysterectomy  01/01/2013    partial hysterectomy, has ovaries    Removal of ovary(s) Right 02/22/2024    RT ovary    Tubal ligation  18 years ago     Current Outpatient Medications   Medication Sig Dispense Refill    Meloxicam 15 MG Oral Tab Take 1 tablet (15 mg total) by mouth daily. Take once daily for 4 weeks 30 tablet 0    methylPREDNISolone (MEDROL) 4 MG Oral Tablet Therapy Pack As directed. 21 each 0    metFORMIN 500 MG Oral Tab Take 1 tablet (500 mg total) by mouth daily with breakfast. 90 tablet 1    meclizine 25 MG Oral Tab Take 1 tablet (25 mg total) by mouth 3 (three) times daily as needed. 30 tablet 1    omeprazole 20 MG Oral Capsule Delayed Release Take 1 capsule (20 mg total) by mouth every morning before breakfast.      Omega-3 Fatty Acids (OMEGA-3 FISH OIL OR) Take by mouth.      VITAMIN E OR       B COMPLEX-C ER OR Take by mouth.      lisinopril 5 MG Oral Tab Take 1 tablet (5 mg total) by mouth daily. 90 tablet 3    empagliflozin 10 MG Oral Tab Take 1 tablet (10 mg total) by mouth daily. 90 tablet 3    aspirin 81 MG Oral Tab EC Take 1 tablet (81 mg total) by mouth daily.      traMADol 50 MG Oral Tab Take 1 tablet (50 mg total) by mouth every 6 (six) hours as needed for Pain. (Patient not taking: Reported on 3/19/2025) 5  tablet 0     Allergies   Allergen Reactions    Lexapro [Escitalopram] SWELLING and Tightness in Throat     Family History   Problem Relation Age of Onset    Hypertension Mother     Diabetes Father     Prostate Cancer Father 65    Cancer Paternal Grandmother     Breast Cancer Neg     Ovarian Cancer Neg     Pancreatic Cancer Neg      Social History     Occupational History    Not on file   Tobacco Use    Smoking status: Former     Types: Cigarettes    Smokeless tobacco: Former     Quit date: 12/29/2013   Vaping Use    Vaping status: Never Used   Substance and Sexual Activity    Alcohol use: Yes     Alcohol/week: 0.0 - 1.0 standard drinks of alcohol     Comment: SOCIAL    Drug use: No    Sexual activity: Not on file        Review of Systems (negative unless bolded):  General: fevers, chills, fatigue  CV:  chest pain, palpitations, leg swelling  Msk: bodyaches, neck pain, neck stiffness  Skin: rashes, open wounds, nonhealing ulcers  Hem: bleeds easily, bruise easily, immunocompromised  Neuro: dizziness, light headedness, headaches  Psych: anxious, depressed, anger issues    oJse Campo MD   Hand, Wrist, & Elbow Surgery  [email or web address]  t: [phone]  f: [phone]

## 2025-04-09 ENCOUNTER — MED REC SCAN ONLY (OUTPATIENT)
Dept: FAMILY MEDICINE CLINIC | Facility: CLINIC | Age: 55
End: 2025-04-09

## 2025-04-21 RX ORDER — MELOXICAM 15 MG/1
TABLET ORAL
Qty: 30 TABLET | Refills: 0 | Status: SHIPPED | OUTPATIENT
Start: 2025-04-21

## 2025-04-21 NOTE — TELEPHONE ENCOUNTER
Meloxcam 15mg    DOS: 1/30/25  RIGHT CARPAL TUNNEL RELEASE   Last OV: 3/19/25  Last refill date: 3/19/25 #/refills: 30/0  Upcoming appt:   Future Appointments   Date Time Provider Department Center   4/30/2025  8:15 AM Jose Campo MD [facility]         Component  Ref Range & Units (hover) 11/19/24 11:18 AM   BUN 11   Creatinine 0.85   BUN/CREA Ratio 12.9   eGFR-Cr 82

## 2025-04-24 ENCOUNTER — HOSPITAL ENCOUNTER (EMERGENCY)
Facility: HOSPITAL | Age: 55
Discharge: LEFT BEFORE DISCHARGE | End: 2025-04-24
Payer: MEDICAID

## 2025-04-24 VITALS
WEIGHT: 195.13 LBS | TEMPERATURE: 98 F | RESPIRATION RATE: 18 BRPM | BODY MASS INDEX: 32 KG/M2 | SYSTOLIC BLOOD PRESSURE: 126 MMHG | OXYGEN SATURATION: 97 % | HEART RATE: 77 BPM | DIASTOLIC BLOOD PRESSURE: 86 MMHG

## 2025-04-24 LAB — GLUCOSE BLDC GLUCOMTR-MCNC: 102 MG/DL (ref 70–99)

## 2025-04-24 PROCEDURE — 99281 EMR DPT VST MAYX REQ PHY/QHP: CPT

## 2025-04-24 PROCEDURE — 82962 GLUCOSE BLOOD TEST: CPT

## 2025-04-24 NOTE — ED INITIAL ASSESSMENT (HPI)
Pt to ED with multiple complaints. Pt states she woke up this am with left sided \"Facial numbness\". Pt states dyspnea since last night. Pt states right leg pain for a \"while\". Pt denies fall or injury. Pt denies cough or fever. Pt denies fall or trauma. Pt states she drove herself here. No respiratory distress noted. Pt is alert and oriented x4. Speech clear. Face symmetrical. Tongue midline. Pt ambulating by self with steady gait.

## 2025-04-30 ENCOUNTER — OFFICE VISIT (OUTPATIENT)
Dept: ORTHOPEDICS CLINIC | Facility: CLINIC | Age: 55
End: 2025-04-30
Payer: MEDICAID

## 2025-04-30 VITALS — BODY MASS INDEX: 32.49 KG/M2 | WEIGHT: 195 LBS | HEIGHT: 65 IN

## 2025-04-30 DIAGNOSIS — G56.21 CUBITAL TUNNEL SYNDROME ON RIGHT: ICD-10-CM

## 2025-04-30 DIAGNOSIS — M18.11 ARTHRITIS OF CARPOMETACARPAL (CMC) JOINT OF RIGHT THUMB: Primary | ICD-10-CM

## 2025-04-30 PROCEDURE — 99214 OFFICE O/P EST MOD 30 MIN: CPT | Performed by: ORTHOPAEDIC SURGERY

## 2025-04-30 NOTE — PROGRESS NOTES
Clinic Note      Assessment/Plan:  54 year old female    Right carpal tunnel syndrome -EMG/NCV negative-improvement with diagnostic injection-status post carpal tunnel release 1/30/2025-pillar pain has improved but still present.  We did discuss a steroid injection which could be helpful in decreasing sensitivity in a more expedited fashion however we will give it more time for self resolution.  Right wrist FCU and ECU tendinitis-continue meloxicam 15 mg  Right thumb CMC arthritis-discussed nonsurgical treatment options which include turmeric/curcumin, CBD oil, Voltaren gel 1%, bracing, NSAIDs, and CMC bracing.  CMC bracing was provided for the patient.  Right medial epicondylitis with concurrent ulnar nerve neuritis-EMG/NCV negative for ulnar neuropathy.  She does have provocative exam findings positive for ulnar nerve neuritis as well as medial epicondylitis.  Recommend obtaining an ultrasound to evaluate the ulnar nerve and for tendinosis of the common flexor tendon at the medial epicondyle    Follow Up: 4-6 weeks    Diagnostic Studies:     EMG/NCV: Negative for peripheral neuropathy     Physical Exam:    Ht 5' 5\" (1.651 m)   Wt 195 lb (88.5 kg)   BMI 32.45 kg/m²     Constitutional: NAD. AOx3. Well-developed and Well-nourished.   Psychiatric: Normal mood/ affect/ behavior. Judgment and thought content normal.     Right Upper Extremity:   Inspection: Incisions well-healed with trace amount of edema   Palpation: Tenderness over the carpal tunnel consistent with pillar pain.  Tenderness to palpation of FCU and ECU tendons.  Patient also has tenderness over the medial epicondyle and over the ulnar nerve   Motion: Elbow: normal bilateral symmetric ext/flex  Wrist: normal bilateral symmetric ext/flex/sup/pro  Finger: full composite fist   Vascular 2+ radial pulse       Median Nerve Exam Right    Phdurkan Negative   Sensation normal   PCBr Sensation normal   APB Weakness No   Thenar Atrophy No     Ulnar Nerve Exam  Right    Sensation normal   1st ELI Weakness No   Hypothenar Atrophy No   Elbow flexion Positive   Tinel's at cubital tunnel Positive     Radial Nerve Exam  Right    Radial Sensory normal       CC: Right hand pain    HPI: This 54 year old RHD female presents with pain in the right distal forearm.  Patient reports moderate to severe pain.  She characterizes the pain as throbbing aching sharp shooting and burning in quality.  Does go into the median nerve distribution.  When she lifts heavy things she feels like she loses strength.  She also reports pain at the base of the thumb joint.  She has tried bracing.  Her symptoms do wake her up at nighttime.    Interval Hx (3/19/2025): Patient reports no recurrence of preoperative symptoms.  She does have some soreness and sensitivity in the palm.  She also notes some pain and discomfort along the ECU and FCU tendons.    Interval Hx (4/30/2025): Continues to have pain in the palm and pain at the base of the thumb.  Meloxicam has been somewhat helpful for the CMC joint pain.  He also has pain along the ulnar aspect of the forearm and medial aspect of the elbow.  She does note pain radiating from the elbow down.      Past Medical History:    Depression    Diabetes (HCC)    Esophageal reflux    High blood pressure    High cholesterol    Migraines    PONV (postoperative nausea and vomiting)    Sleep apnea    NO MACHINE/TREATMENT    Stroke (HCC)    TIA    Visual impairment    CONTACTS/GLASSES     Past Surgical History:   Procedure Laterality Date    Appendectomy  02/22/2024    Cholecystectomy  01/01/1991    Hysterectomy  01/01/2013    partial hysterectomy, has ovaries    Removal of ovary(s) Right 02/22/2024    RT ovary    Tubal ligation  18 years ago     Current Outpatient Medications   Medication Sig Dispense Refill    MELOXICAM 15 MG Oral Tab TAKE 1 TABLET(15 MG) BY MOUTH DAILY FOR 4 WEEKS 30 tablet 0    metFORMIN 500 MG Oral Tab Take 1 tablet (500 mg total) by mouth daily with  breakfast. 90 tablet 1    omeprazole 20 MG Oral Capsule Delayed Release Take 1 capsule (20 mg total) by mouth every morning before breakfast.      Omega-3 Fatty Acids (OMEGA-3 FISH OIL OR) Take by mouth.      VITAMIN E OR       B COMPLEX-C ER OR Take by mouth.      lisinopril 5 MG Oral Tab Take 1 tablet (5 mg total) by mouth daily. 90 tablet 3    empagliflozin 10 MG Oral Tab Take 1 tablet (10 mg total) by mouth daily. 90 tablet 3    aspirin 81 MG Oral Tab EC Take 1 tablet (81 mg total) by mouth daily.      traMADol 50 MG Oral Tab Take 1 tablet (50 mg total) by mouth every 6 (six) hours as needed for Pain. (Patient not taking: Reported on 3/19/2025) 5 tablet 0    methylPREDNISolone (MEDROL) 4 MG Oral Tablet Therapy Pack As directed. 21 each 0    meclizine 25 MG Oral Tab Take 1 tablet (25 mg total) by mouth 3 (three) times daily as needed. 30 tablet 1     Allergies   Allergen Reactions    Lexapro [Escitalopram] SWELLING and Tightness in Throat     Family History   Problem Relation Age of Onset    Hypertension Mother     Diabetes Father     Prostate Cancer Father 65    Cancer Paternal Grandmother     Breast Cancer Neg     Ovarian Cancer Neg     Pancreatic Cancer Neg      Social History     Occupational History    Not on file   Tobacco Use    Smoking status: Former     Types: Cigarettes    Smokeless tobacco: Former     Quit date: 12/29/2013   Vaping Use    Vaping status: Never Used   Substance and Sexual Activity    Alcohol use: Yes     Alcohol/week: 0.0 - 1.0 standard drinks of alcohol     Comment: SOCIAL    Drug use: No    Sexual activity: Not on file        Review of Systems (negative unless bolded):  General: fevers, chills, fatigue  CV:  chest pain, palpitations, leg swelling  Msk: bodyaches, neck pain, neck stiffness  Skin: rashes, open wounds, nonhealing ulcers  Hem: bleeds easily, bruise easily, immunocompromised  Neuro: dizziness, light headedness, headaches  Psych: anxious, depressed, anger issues    Jose  MD Alber   Hand, Wrist, & Elbow Surgery  [email or web address]  t: [phone]  f: [phone]

## 2025-05-10 ENCOUNTER — HOSPITAL ENCOUNTER (OUTPATIENT)
Dept: ULTRASOUND IMAGING | Facility: HOSPITAL | Age: 55
Discharge: HOME | End: 2025-05-10
Attending: NURSE PRACTITIONER
Payer: MEDICAID

## 2025-05-10 ENCOUNTER — OFFICE VISIT (OUTPATIENT)
Dept: FAMILY MEDICINE CLINIC | Facility: CLINIC | Age: 55
End: 2025-05-10
Payer: MEDICAID

## 2025-05-10 ENCOUNTER — TELEPHONE (OUTPATIENT)
Dept: FAMILY MEDICINE CLINIC | Facility: CLINIC | Age: 55
End: 2025-05-10

## 2025-05-10 VITALS
HEART RATE: 77 BPM | WEIGHT: 195.19 LBS | SYSTOLIC BLOOD PRESSURE: 113 MMHG | BODY MASS INDEX: 32.52 KG/M2 | HEIGHT: 65 IN | DIASTOLIC BLOOD PRESSURE: 76 MMHG

## 2025-05-10 DIAGNOSIS — M79.661 PAIN IN RIGHT LOWER LEG: Primary | ICD-10-CM

## 2025-05-10 DIAGNOSIS — M79.651 PAIN IN RIGHT THIGH: ICD-10-CM

## 2025-05-10 DIAGNOSIS — M79.661 PAIN IN RIGHT LOWER LEG: ICD-10-CM

## 2025-05-10 PROCEDURE — 93971 EXTREMITY STUDY: CPT | Performed by: NURSE PRACTITIONER

## 2025-05-10 PROCEDURE — 99214 OFFICE O/P EST MOD 30 MIN: CPT | Performed by: NURSE PRACTITIONER

## 2025-05-10 RX ORDER — IBUPROFEN 600 MG/1
600 TABLET, FILM COATED ORAL EVERY 6 HOURS PRN
Qty: 40 TABLET | Refills: 0 | Status: SHIPPED | OUTPATIENT
Start: 2025-05-10 | End: 2025-06-09

## 2025-05-10 NOTE — PROGRESS NOTES
HPI    Patient presents for concerns of right inner thigh pain that comes and goes.  Starts at upper thigh and radiates to posterior mid calf.  Is present always but at times pain becomes intense.  Present for the past 2 weeks.  Has taken tylenol without relief of symptoms.  Was having shortness of breath last week.  Presented to ER and left due to long wait times.    Review of Systems   Musculoskeletal:         Posterior calf and thigh pain right side.        Vitals:    05/10/25 1118   BP: 113/76   Pulse: 77   Weight: 195 lb 3.2 oz (88.5 kg)   Height: 5' 5\" (1.651 m)     Body mass index is 32.48 kg/m².    Health Maintenance   Topic Date Due    Colorectal Cancer Screening  Never done    Pneumococcal Vaccine: 50+ Years (1 of 2 - PCV) Never done    Zoster Vaccines (1 of 2) Never done    DTaP,Tdap,and Td Vaccines (2 - Td or Tdap) 09/30/2021    Annual Depression Screening  01/01/2025    Diabetes Care: Microalb/Creat Ratio (Annual)  Never done    Diabetes Care A1C  07/19/2025    Influenza Vaccine (Season Ended) 10/01/2025    Annual Physical  11/19/2025    Diabetes Care: GFR  11/19/2025    Diabetes Care Foot Exam  01/13/2026    Mammogram  02/19/2026    Diabetes Care Dilated Eye Exam  04/03/2026    Meningococcal B Vaccine  Aged Out       Past Medical History[1]    .Past Surgical History[2]    Family History[3]    Social History     Socioeconomic History    Marital status:      Spouse name: Not on file    Number of children: Not on file    Years of education: Not on file    Highest education level: Not on file   Occupational History    Not on file   Tobacco Use    Smoking status: Former     Types: Cigarettes    Smokeless tobacco: Former     Quit date: 12/29/2013   Vaping Use    Vaping status: Never Used   Substance and Sexual Activity    Alcohol use: Not Currently     Alcohol/week: 0.0 - 1.0 standard drinks of alcohol     Comment: SOCIAL    Drug use: No    Sexual activity: Not on file   Other Topics Concern    Not on  file   Social History Narrative    Not on file     Social Drivers of Health     Food Insecurity: Not At Risk (8/12/2023)    Received from [facility]    Food Insecurity     RETIRE How often in the past 12 months would you say you are worried or stressed about having enough money to buy nutritious meals? : Never   Transportation Needs: No Transportation Needs (8/12/2023)    Received from [facility]    PRAPARE - Transportation     In the past 12 months, has lack of transportation kept you from medical appointments or from getting medications?: No     In the past 12 months, has lack of transportation kept you from meetings, work, or from getting things needed for daily living?: No   Stress: Not on file   Housing Stability: Not on file       Current Medications[4]    Allergies:  Allergies[5]    Physical Exam  Vitals and nursing note reviewed.   Constitutional:       Appearance: Normal appearance.   Cardiovascular:      Rate and Rhythm: Normal rate and regular rhythm.      Heart sounds: Normal heart sounds. No murmur heard.  Pulmonary:      Effort: Pulmonary effort is normal. No respiratory distress.      Breath sounds: Normal breath sounds. No stridor. No wheezing, rhonchi or rales.   Chest:      Chest wall: No tenderness.   Musculoskeletal:      Right upper leg: Tenderness present.      Right lower leg: Tenderness present.   Neurological:      Mental Status: She is alert and oriented to person, place, and time.          Assessment and Plan:   Problem List Items Addressed This Visit    None  Visit Diagnoses         Pain in right lower leg    -  Primary    Relevant Orders    US VENOUS DOPPLER LEG RIGHT - DIAG IMG (CPT=93971)      Pain in right thigh        Relevant Orders    US VENOUS DOPPLER LEG RIGHT - DIAG IMG (CPT=93971)           Stat venous Doppler today.    Discussed plan of care with patient and patient is in agreement.  All questions answered. Patient to call with questions or  concerns.    Encouraged to sign up for My Chart if not already registered.        [1]   Past Medical History:   Depression    Diabetes (HCC)    Esophageal reflux    High blood pressure    High cholesterol    Migraines    PONV (postoperative nausea and vomiting)    Sleep apnea    NO MACHINE/TREATMENT    Stroke (HCC)    TIA    Visual impairment    CONTACTS/GLASSES   [2]   Past Surgical History:  Procedure Laterality Date    Appendectomy  02/22/2024    Cholecystectomy  01/01/1991    Hysterectomy  01/01/2013    partial hysterectomy, has ovaries    Removal of ovary(s) Right 02/22/2024    RT ovary    Tubal ligation  18 years ago   [3]   Family History  Problem Relation Age of Onset    Hypertension Mother     Diabetes Father     Prostate Cancer Father 65    Cancer Paternal Grandmother     Breast Cancer Neg     Ovarian Cancer Neg     Pancreatic Cancer Neg    [4]   Current Outpatient Medications   Medication Sig Dispense Refill    MELOXICAM 15 MG Oral Tab TAKE 1 TABLET(15 MG) BY MOUTH DAILY FOR 4 WEEKS 30 tablet 0    metFORMIN 500 MG Oral Tab Take 1 tablet (500 mg total) by mouth daily with breakfast. 90 tablet 1    omeprazole 20 MG Oral Capsule Delayed Release Take 1 capsule (20 mg total) by mouth every morning before breakfast.      Omega-3 Fatty Acids (OMEGA-3 FISH OIL OR) Take by mouth.      B COMPLEX-C ER OR Take by mouth.      lisinopril 5 MG Oral Tab Take 1 tablet (5 mg total) by mouth daily. 90 tablet 3    empagliflozin 10 MG Oral Tab Take 1 tablet (10 mg total) by mouth daily. 90 tablet 3    aspirin 81 MG Oral Tab EC Take 1 tablet (81 mg total) by mouth daily.      VITAMIN E OR      [5]   Allergies  Allergen Reactions    Lexapro [Escitalopram] SWELLING and Tightness in Throat

## 2025-05-14 ENCOUNTER — TELEMEDICINE (OUTPATIENT)
Dept: FAMILY MEDICINE CLINIC | Facility: CLINIC | Age: 55
End: 2025-05-14
Payer: MEDICAID

## 2025-05-14 DIAGNOSIS — M54.41 ACUTE MIDLINE LOW BACK PAIN WITH RIGHT-SIDED SCIATICA: ICD-10-CM

## 2025-05-14 DIAGNOSIS — M79.651 PAIN IN RIGHT THIGH: ICD-10-CM

## 2025-05-14 DIAGNOSIS — M79.661 PAIN IN RIGHT LOWER LEG: Primary | ICD-10-CM

## 2025-05-14 PROCEDURE — 99212 OFFICE O/P EST SF 10 MIN: CPT | Performed by: NURSE PRACTITIONER

## 2025-05-14 RX ORDER — GABAPENTIN 100 MG/1
100 CAPSULE ORAL NIGHTLY
Qty: 90 CAPSULE | Refills: 0 | Status: SHIPPED | OUTPATIENT
Start: 2025-05-14

## 2025-05-14 NOTE — PROGRESS NOTES
/[facility]    Virtual Telephone/Virtual Check-In    Lissette Block verbally consents to a Virtual/Telephone Check-In visit on 05/14/25.  Patient has been referred to the [facility] website at [email or web address] to review the yearly Consent to Treat document.    Patient understands and accepts financial responsibility for any deductible, co-insurance and/or co-pays associated with this service.    Duration of the service: 10 minutes      Summary of topics discussed:     Patient presents for follow up for right leg pain.  Had a venous doppler on 5/10.  On Saturday, pain was a 9/10.  Today, after starting magnesium 250 mg nightly and ibuprofen 600 mg bid in am and pm, pain is 5/10.  Pain is still constant.  Patient does report that she has also had low back pain for approximately 2 months.  Is midline but at times radiates to right side.    Review of Systems   Musculoskeletal:  Positive for back pain.        Right leg pain.        There were no vitals filed for this visit.  There is no height or weight on file to calculate BMI.    Health Maintenance   Topic Date Due    Colorectal Cancer Screening  Never done    Pneumococcal Vaccine: 50+ Years (1 of 2 - PCV) Never done    Zoster Vaccines (1 of 2) Never done    DTaP,Tdap,and Td Vaccines (2 - Td or Tdap) 09/30/2021    Annual Depression Screening  01/01/2025    Diabetes Care: Microalb/Creat Ratio (Annual)  Never done    Diabetes Care A1C  07/19/2025    Influenza Vaccine (Season Ended) 10/01/2025    Annual Physical  11/19/2025    Diabetes Care: GFR  11/19/2025    Diabetes Care Foot Exam  01/13/2026    Mammogram  02/19/2026    Diabetes Care Dilated Eye Exam  04/03/2026    Meningococcal B Vaccine  Aged Out       Past Medical History[1]    .Past Surgical History[2]    Family History[3]    Social History     Socioeconomic History    Marital status:      Spouse name: Not on file    Number of children: Not on file    Years of education: Not on file    Highest education  level: Not on file   Occupational History    Not on file   Tobacco Use    Smoking status: Former     Types: Cigarettes    Smokeless tobacco: Former     Quit date: 12/29/2013   Vaping Use    Vaping status: Never Used   Substance and Sexual Activity    Alcohol use: Not Currently     Alcohol/week: 0.0 - 1.0 standard drinks of alcohol     Comment: SOCIAL    Drug use: No    Sexual activity: Not on file   Other Topics Concern    Not on file   Social History Narrative    Not on file     Social Drivers of Health     Food Insecurity: Not At Risk (8/12/2023)    Received from [facility]    Food Insecurity     RETIRE How often in the past 12 months would you say you are worried or stressed about having enough money to buy nutritious meals? : Never   Transportation Needs: No Transportation Needs (8/12/2023)    Received from [facility]    PRAPARE - Transportation     In the past 12 months, has lack of transportation kept you from medical appointments or from getting medications?: No     In the past 12 months, has lack of transportation kept you from meetings, work, or from getting things needed for daily living?: No   Stress: Not on file   Housing Stability: Not on file       Current Medications[4]    Allergies:  Allergies[5]    Physical Exam  Constitutional:       Appearance: Normal appearance.   Pulmonary:      Effort: No respiratory distress.   Neurological:      Mental Status: She is alert and oriented to person, place, and time.          Assessment and Plan:   Problem List Items Addressed This Visit    None  Visit Diagnoses         Pain in right lower leg    -  Primary    Relevant Medications    gabapentin 100 MG Oral Cap      Pain in right thigh        Relevant Medications    gabapentin 100 MG Oral Cap      Acute midline low back pain with right-sided sciatica        Relevant Medications    gabapentin 100 MG Oral Cap    Other Relevant Orders    XR LUMBAR SPINE (MIN 4 VIEWS) (CPT=72110)            Trial of gabapentin once nightly.  Will have patient follow-up for lumbar spine x-ray to rule out lumbar radiculopathy.  Follow-up in 2 to 3 weeks.  Supportive care discussed.    Discussed plan of care with patient and patient is in agreement.  All questions answered. Patient to call with questions or concerns.    Encouraged to sign up for My Chart if not already registered.        [1]   Past Medical History:   Depression    Diabetes (HCC)    Esophageal reflux    High blood pressure    High cholesterol    Migraines    PONV (postoperative nausea and vomiting)    Sleep apnea    NO MACHINE/TREATMENT    Stroke (HCC)    TIA    Visual impairment    CONTACTS/GLASSES   [2]   Past Surgical History:  Procedure Laterality Date    Appendectomy  02/22/2024    Cholecystectomy  01/01/1991    Hysterectomy  01/01/2013    partial hysterectomy, has ovaries    Removal of ovary(s) Right 02/22/2024    RT ovary    Tubal ligation  18 years ago   [3]   Family History  Problem Relation Age of Onset    Hypertension Mother     Diabetes Father     Prostate Cancer Father 65    Cancer Paternal Grandmother     Breast Cancer Neg     Ovarian Cancer Neg     Pancreatic Cancer Neg    [4]   Current Outpatient Medications   Medication Sig Dispense Refill    gabapentin 100 MG Oral Cap Take 1 capsule (100 mg total) by mouth nightly. 90 capsule 0    ibuprofen 600 MG Oral Tab Take 1 tablet (600 mg total) by mouth every 6 (six) hours as needed for Pain. 40 tablet 0    MELOXICAM 15 MG Oral Tab TAKE 1 TABLET(15 MG) BY MOUTH DAILY FOR 4 WEEKS 30 tablet 0    metFORMIN 500 MG Oral Tab Take 1 tablet (500 mg total) by mouth daily with breakfast. 90 tablet 1    omeprazole 20 MG Oral Capsule Delayed Release Take 1 capsule (20 mg total) by mouth every morning before breakfast.      Omega-3 Fatty Acids (OMEGA-3 FISH OIL OR) Take by mouth.      VITAMIN E OR       B COMPLEX-C ER OR Take by mouth.      lisinopril 5 MG Oral Tab Take 1 tablet (5 mg total) by mouth daily.  90 tablet 3    empagliflozin 10 MG Oral Tab Take 1 tablet (10 mg total) by mouth daily. 90 tablet 3    aspirin 81 MG Oral Tab EC Take 1 tablet (81 mg total) by mouth daily.     [5]   Allergies  Allergen Reactions    Lexapro [Escitalopram] SWELLING and Tightness in Throat

## 2025-05-19 ENCOUNTER — OFFICE VISIT (OUTPATIENT)
Dept: PODIATRY CLINIC | Facility: CLINIC | Age: 55
End: 2025-05-19
Payer: MEDICAID

## 2025-05-19 DIAGNOSIS — M72.2 PLANTAR FASCIITIS: ICD-10-CM

## 2025-05-19 DIAGNOSIS — M76.61 ACHILLES TENDINITIS OF RIGHT LOWER EXTREMITY: ICD-10-CM

## 2025-05-19 DIAGNOSIS — L60.0 INGROWN TOENAIL: ICD-10-CM

## 2025-05-19 DIAGNOSIS — M79.671 PAIN OF RIGHT HEEL: Primary | ICD-10-CM

## 2025-05-19 RX ORDER — DEXAMETHASONE SODIUM PHOSPHATE 4 MG/ML
4 VIAL (ML) INJECTION ONCE
Status: COMPLETED | OUTPATIENT
Start: 2025-05-19 | End: 2025-05-19

## 2025-05-19 RX ORDER — TRIAMCINOLONE ACETONIDE 40 MG/ML
40 INJECTION, SUSPENSION INTRA-ARTICULAR; INTRAMUSCULAR ONCE
Status: COMPLETED | OUTPATIENT
Start: 2025-05-19 | End: 2025-05-19

## 2025-05-19 RX ORDER — MELOXICAM 15 MG/1
15 TABLET ORAL
Qty: 30 TABLET | Refills: 1 | Status: SHIPPED | OUTPATIENT
Start: 2025-05-19

## 2025-05-19 RX ADMIN — TRIAMCINOLONE ACETONIDE 40 MG: 40 INJECTION, SUSPENSION INTRA-ARTICULAR; INTRAMUSCULAR at 15:55:00

## 2025-05-19 RX ADMIN — DEXAMETHASONE SODIUM PHOSPHATE 4 MG: 4 MG/ML VIAL (ML) INJECTION at 15:54:00

## 2025-05-19 NOTE — PROGRESS NOTES
[facility]  Progress Note      Lissette Block is a 54 year old female.   Chief Complaint   Patient presents with    Heel Pain     R heel f/u - Pt still is having pain. Xray results. Constant pain. Interested in cortisone     Toe Pain     L hallux - Pt still is having pain to both borders.             HPI:     Patient is a pleasant 54-year-old female presents to clinic for evaluation of low extremity pain.  Patient admits pain to the right posterior heel as well as to the right plantar heel.  Patient did have x-rays done few months ago and is here to discuss the results and also discussed treatment options.  Patient is also relating to pain to her left lateral hallux nail border.  She admits that her symptoms started after pedicure.  Admits to pain and tenderness.    Allergies: Lexapro [escitalopram]    Current Medications[1]   Past Medical History[2]   Past Surgical History[3]   Family History[4]   Social Hx on file[5]        REVIEW OF SYSTEMS:     Denies nause, fever, chills  No calf pain  Denies chest pain or SOB      EXAM:   There were no vitals taken for this visit.  GENERAL: well developed, well nourished, in no apparent distress  EXTREMITIES:   1. Integument: Normal skin temperature and turgor.  Incurvated left lateral hallux nail border with no signs of infection.  2. Vascular: Dorsalis pedis two out of four bilateral and posterior tibial pulses two out of   four bilateral, capillary refill normal.   3. Musculoskeletal: All muscle groups are graded 5 out of 5 in the foot and ankle.  Limited right ankle joint dorsiflexion with tenderness palpation to right posterior heel along the Achilles tendon insertion and at the right medial calcaneal tubercle   4. Neurological: Normal sharp dull sensation; reflexes normal.      US VENOUS DOPPLER LEG RIGHT - DIAG IMG (CPT=93971)  Result Date: 5/10/2025  CONCLUSION:  No DVT in the right lower extremity.    Dictated by (CST): Gerardo Mcghee MD on  5/10/2025 at 1:27 PM     Finalized by (CST): Gerardo Mcghee MD on 5/10/2025 at 1:28 PM             ASSESSMENT AND PLAN:   Diagnoses and all orders for this visit:    Pain of right heel  -     dexamethasone (Decadron) 4 MG/ML injection 4 mg  -     triamcinolone acetonide (Kenalog-40) 40 MG/ML injection 40 mg  -     Physical Therapy Referral - [place] Locations    Plantar fasciitis  -     dexamethasone (Decadron) 4 MG/ML injection 4 mg  -     triamcinolone acetonide (Kenalog-40) 40 MG/ML injection 40 mg  -     Physical Therapy Referral - [place] Locations    Ingrown toenail  -     Physical Therapy Referral - [place] Locations    Achilles tendinitis of right lower extremity  -     Physical Therapy Referral - [place] Locations    Other orders  -     Meloxicam 15 MG Oral Tab; Take 1 tablet (15 mg total) by mouth daily with dinner.        Plan:       -Patient examined, chart history reviewed.  -Discussed etiology of patient's condition and various treatment options.  -Discussed importance of proper shoe gear and orthotics.  Patient to avoid walking barefoot at all times.  -Discussed importance of stretching exercises.  Patient to aim to stretch 3-5 times daily.  -Discussed steroid injection with patient including benefits and risks.  Patient agrees to injection and written consent was obtained.  -After prepping site with alcohol, administered steroid injection to right plantar heel consisting of 1 cc of 1% lidocaine plain, 1 cc of dexamethasone, and  1 cc of Kenalog 40.  Patient tolerated the injection well and there were no complications.  Site was dressed with Band-Aid.  -Wear supportive shoe gear and inserts at all times with ambulation.  Avoid walking barefoot.  - Perform stretching exercises 3-5 times daily.  Instructions dispensed.  - Monitor response to steroid injection.  - Follow-up in 3 months for reevaluation.      Regards to the ingrown left lateral hallux nail border we discussed partial hallux nail  avulsion with chemical matricectomy.  Advised patient to bring in open tissue and schedule for 20-minute procedure appointment to have this done.  The patient indicates understanding of these issues and agrees to the plan.        Monse Campos DPM        [1]   Current Outpatient Medications   Medication Sig Dispense Refill    Meloxicam 15 MG Oral Tab Take 1 tablet (15 mg total) by mouth daily with dinner. 30 tablet 1    gabapentin 100 MG Oral Cap Take 1 capsule (100 mg total) by mouth nightly. 90 capsule 0    ibuprofen 600 MG Oral Tab Take 1 tablet (600 mg total) by mouth every 6 (six) hours as needed for Pain. 40 tablet 0    MELOXICAM 15 MG Oral Tab TAKE 1 TABLET(15 MG) BY MOUTH DAILY FOR 4 WEEKS 30 tablet 0    metFORMIN 500 MG Oral Tab Take 1 tablet (500 mg total) by mouth daily with breakfast. 90 tablet 1    omeprazole 20 MG Oral Capsule Delayed Release Take 1 capsule (20 mg total) by mouth every morning before breakfast.      Omega-3 Fatty Acids (OMEGA-3 FISH OIL OR) Take by mouth.      VITAMIN E OR       B COMPLEX-C ER OR Take by mouth.      lisinopril 5 MG Oral Tab Take 1 tablet (5 mg total) by mouth daily. 90 tablet 3    empagliflozin 10 MG Oral Tab Take 1 tablet (10 mg total) by mouth daily. 90 tablet 3    aspirin 81 MG Oral Tab EC Take 1 tablet (81 mg total) by mouth daily.     [2]   Past Medical History:   Depression    Diabetes (HCC)    Esophageal reflux    High blood pressure    High cholesterol    Migraines    PONV (postoperative nausea and vomiting)    Sleep apnea    NO MACHINE/TREATMENT    Stroke (HCC)    TIA    Visual impairment    CONTACTS/GLASSES   [3]   Past Surgical History:  Procedure Laterality Date    Appendectomy  02/22/2024    Cholecystectomy  01/01/1991    Hysterectomy  01/01/2013    partial hysterectomy, has ovaries    Removal of ovary(s) Right 02/22/2024    RT ovary    Tubal ligation  18 years ago   [4]   Family History  Problem Relation Age of Onset    Hypertension Mother      Diabetes Father     Prostate Cancer Father 65    Cancer Paternal Grandmother     Breast Cancer Neg     Ovarian Cancer Neg     Pancreatic Cancer Neg    [5]   Social History  Socioeconomic History    Marital status:    Tobacco Use    Smoking status: Former     Types: Cigarettes    Smokeless tobacco: Former     Quit date: 12/29/2013   Vaping Use    Vaping status: Never Used   Substance and Sexual Activity    Alcohol use: Not Currently     Alcohol/week: 0.0 - 1.0 standard drinks of alcohol     Comment: SOCIAL    Drug use: No

## 2025-05-19 NOTE — PROGRESS NOTES
Per Dr Campos request was draw 1 syringe with 1 ml Lidocaine 1%, 1 ml Kenalog 40 mg and 1 ml Dexamethasone 20mg/5ml for this patient

## 2025-05-20 ENCOUNTER — PATIENT MESSAGE (OUTPATIENT)
Dept: FAMILY MEDICINE CLINIC | Facility: CLINIC | Age: 55
End: 2025-05-20

## 2025-05-20 ENCOUNTER — HOSPITAL ENCOUNTER (OUTPATIENT)
Dept: GENERAL RADIOLOGY | Facility: HOSPITAL | Age: 55
Discharge: HOME | End: 2025-05-20
Attending: NURSE PRACTITIONER
Payer: MEDICAID

## 2025-05-20 DIAGNOSIS — M54.41 ACUTE MIDLINE LOW BACK PAIN WITH RIGHT-SIDED SCIATICA: ICD-10-CM

## 2025-05-20 PROCEDURE — 72110 X-RAY EXAM L-2 SPINE 4/>VWS: CPT | Performed by: NURSE PRACTITIONER

## 2025-05-21 ENCOUNTER — HOSPITAL ENCOUNTER (INPATIENT)
Facility: HOSPITAL | Age: 55
LOS: 5 days | Discharge: HOME | End: 2025-05-26
Attending: EMERGENCY MEDICINE | Admitting: HOSPITALIST
Payer: MEDICAID

## 2025-05-21 ENCOUNTER — APPOINTMENT (OUTPATIENT)
Dept: CT IMAGING | Facility: HOSPITAL | Age: 55
End: 2025-05-21
Attending: EMERGENCY MEDICINE
Payer: MEDICAID

## 2025-05-21 ENCOUNTER — NURSE TRIAGE (OUTPATIENT)
Dept: FAMILY MEDICINE CLINIC | Facility: CLINIC | Age: 55
End: 2025-05-21

## 2025-05-21 ENCOUNTER — APPOINTMENT (OUTPATIENT)
Dept: GENERAL RADIOLOGY | Facility: HOSPITAL | Age: 55
End: 2025-05-21
Attending: EMERGENCY MEDICINE
Payer: MEDICAID

## 2025-05-21 DIAGNOSIS — K85.90 ACUTE PANCREATITIS, UNSPECIFIED COMPLICATION STATUS, UNSPECIFIED PANCREATITIS TYPE (HCC): Primary | ICD-10-CM

## 2025-05-21 LAB
ALBUMIN SERPL-MCNC: 4.4 G/DL (ref 3.2–4.8)
ALBUMIN/GLOB SERPL: 1.4 {RATIO} (ref 1–2)
ALP LIVER SERPL-CCNC: 69 U/L (ref 41–108)
ALT SERPL-CCNC: 70 U/L (ref 10–49)
ANION GAP SERPL CALC-SCNC: 10 MMOL/L (ref 0–18)
AST SERPL-CCNC: 59 U/L (ref ?–34)
ATRIAL RATE: 63 BPM
BASOPHILS # BLD AUTO: 0.1 X10(3) UL (ref 0–0.2)
BASOPHILS NFR BLD AUTO: 0.9 %
BILIRUB SERPL-MCNC: 0.4 MG/DL (ref 0.3–1.2)
BUN BLD-MCNC: 15 MG/DL (ref 9–23)
BUN/CREAT SERPL: 15.6 (ref 10–20)
CALCIUM BLD-MCNC: 8.9 MG/DL (ref 8.7–10.4)
CHLORIDE SERPL-SCNC: 104 MMOL/L (ref 98–112)
CO2 SERPL-SCNC: 22 MMOL/L (ref 21–32)
CREAT BLD-MCNC: 0.96 MG/DL (ref 0.55–1.02)
DEPRECATED RDW RBC AUTO: 39.6 FL (ref 35.1–46.3)
EGFRCR SERPLBLD CKD-EPI 2021: 71 ML/MIN/1.73M2 (ref 60–?)
EOSINOPHIL # BLD AUTO: 0.22 X10(3) UL (ref 0–0.7)
EOSINOPHIL NFR BLD AUTO: 1.9 %
ERYTHROCYTE [DISTWIDTH] IN BLOOD BY AUTOMATED COUNT: 13 % (ref 11–15)
EST. AVERAGE GLUCOSE BLD GHB EST-MCNC: 157 MG/DL (ref 68–126)
GLOBULIN PLAS-MCNC: 3.1 G/DL (ref 2–3.5)
GLUCOSE BLD-MCNC: 264 MG/DL (ref 70–99)
GLUCOSE BLDC GLUCOMTR-MCNC: 120 MG/DL (ref 70–99)
GLUCOSE BLDC GLUCOMTR-MCNC: 164 MG/DL (ref 70–99)
GLUCOSE BLDC GLUCOMTR-MCNC: 271 MG/DL (ref 70–99)
HBA1C MFR BLD: 7.1 % (ref ?–5.7)
HCT VFR BLD AUTO: 44.9 % (ref 35–48)
HGB BLD-MCNC: 14.8 G/DL (ref 12–16)
IMM GRANULOCYTES # BLD AUTO: 0.11 X10(3) UL (ref 0–1)
IMM GRANULOCYTES NFR BLD: 0.9 %
LIPASE SERPL-CCNC: 60 U/L (ref 12–53)
LYMPHOCYTES # BLD AUTO: 4 X10(3) UL (ref 1–4)
LYMPHOCYTES NFR BLD AUTO: 34.3 %
MCH RBC QN AUTO: 27.8 PG (ref 26–34)
MCHC RBC AUTO-ENTMCNC: 33 G/DL (ref 31–37)
MCV RBC AUTO: 84.4 FL (ref 80–100)
MONOCYTES # BLD AUTO: 1.25 X10(3) UL (ref 0.1–1)
MONOCYTES NFR BLD AUTO: 10.7 %
NEUTROPHILS # BLD AUTO: 5.99 X10 (3) UL (ref 1.5–7.7)
NEUTROPHILS # BLD AUTO: 5.99 X10(3) UL (ref 1.5–7.7)
NEUTROPHILS NFR BLD AUTO: 51.3 %
OSMOLALITY SERPL CALC.SUM OF ELEC: 292 MOSM/KG (ref 275–295)
P AXIS: 61 DEGREES
P-R INTERVAL: 192 MS
PLATELET # BLD AUTO: 306 10(3)UL (ref 150–450)
POTASSIUM SERPL-SCNC: 4.5 MMOL/L (ref 3.5–5.1)
PROT SERPL-MCNC: 7.5 G/DL (ref 5.7–8.2)
Q-T INTERVAL: 420 MS
QRS DURATION: 82 MS
QTC CALCULATION (BEZET): 429 MS
R AXIS: 57 DEGREES
RBC # BLD AUTO: 5.32 X10(6)UL (ref 3.8–5.3)
SODIUM SERPL-SCNC: 136 MMOL/L (ref 136–145)
T AXIS: 64 DEGREES
TRIGL SERPL-MCNC: 344 MG/DL (ref 30–149)
TROPONIN I SERPL HS-MCNC: <3 NG/L (ref ?–34)
VENTRICULAR RATE: 63 BPM
WBC # BLD AUTO: 11.7 X10(3) UL (ref 4–11)

## 2025-05-21 PROCEDURE — 71045 X-RAY EXAM CHEST 1 VIEW: CPT | Performed by: EMERGENCY MEDICINE

## 2025-05-21 PROCEDURE — 74177 CT ABD & PELVIS W/CONTRAST: CPT | Performed by: EMERGENCY MEDICINE

## 2025-05-21 PROCEDURE — 99223 1ST HOSP IP/OBS HIGH 75: CPT | Performed by: HOSPITALIST

## 2025-05-21 RX ORDER — MORPHINE SULFATE 2 MG/ML
2 INJECTION, SOLUTION INTRAMUSCULAR; INTRAVENOUS EVERY 2 HOUR PRN
Status: DISCONTINUED | OUTPATIENT
Start: 2025-05-21 | End: 2025-05-26

## 2025-05-21 RX ORDER — MORPHINE SULFATE 4 MG/ML
4 INJECTION, SOLUTION INTRAMUSCULAR; INTRAVENOUS EVERY 2 HOUR PRN
Status: DISCONTINUED | OUTPATIENT
Start: 2025-05-21 | End: 2025-05-26

## 2025-05-21 RX ORDER — KETOROLAC TROMETHAMINE 30 MG/ML
30 INJECTION, SOLUTION INTRAMUSCULAR; INTRAVENOUS ONCE
Status: COMPLETED | OUTPATIENT
Start: 2025-05-21 | End: 2025-05-21

## 2025-05-21 RX ORDER — NICOTINE POLACRILEX 4 MG
15 LOZENGE BUCCAL
Status: DISCONTINUED | OUTPATIENT
Start: 2025-05-21 | End: 2025-05-26

## 2025-05-21 RX ORDER — FAMOTIDINE 10 MG/ML
20 INJECTION, SOLUTION INTRAVENOUS ONCE
Status: COMPLETED | OUTPATIENT
Start: 2025-05-21 | End: 2025-05-21

## 2025-05-21 RX ORDER — PANTOPRAZOLE SODIUM 40 MG/1
40 TABLET, DELAYED RELEASE ORAL
Status: DISCONTINUED | OUTPATIENT
Start: 2025-05-22 | End: 2025-05-26

## 2025-05-21 RX ORDER — ONDANSETRON 2 MG/ML
4 INJECTION INTRAMUSCULAR; INTRAVENOUS EVERY 6 HOURS PRN
Status: DISCONTINUED | OUTPATIENT
Start: 2025-05-21 | End: 2025-05-26

## 2025-05-21 RX ORDER — GARLIC EXTRACT 500 MG
1 CAPSULE ORAL DAILY
COMMUNITY
End: 2025-05-29

## 2025-05-21 RX ORDER — DEXTROSE MONOHYDRATE 25 G/50ML
50 INJECTION, SOLUTION INTRAVENOUS
Status: DISCONTINUED | OUTPATIENT
Start: 2025-05-21 | End: 2025-05-26

## 2025-05-21 RX ORDER — MORPHINE SULFATE 4 MG/ML
4 INJECTION, SOLUTION INTRAMUSCULAR; INTRAVENOUS ONCE
Status: COMPLETED | OUTPATIENT
Start: 2025-05-21 | End: 2025-05-21

## 2025-05-21 RX ORDER — SODIUM CHLORIDE 9 MG/ML
INJECTION, SOLUTION INTRAVENOUS CONTINUOUS
Status: DISCONTINUED | OUTPATIENT
Start: 2025-05-21 | End: 2025-05-22

## 2025-05-21 RX ORDER — NICOTINE POLACRILEX 4 MG
30 LOZENGE BUCCAL
Status: DISCONTINUED | OUTPATIENT
Start: 2025-05-21 | End: 2025-05-26

## 2025-05-21 RX ORDER — MORPHINE SULFATE 2 MG/ML
1 INJECTION, SOLUTION INTRAMUSCULAR; INTRAVENOUS EVERY 2 HOUR PRN
Status: DISCONTINUED | OUTPATIENT
Start: 2025-05-21 | End: 2025-05-26

## 2025-05-21 RX ORDER — PROCHLORPERAZINE EDISYLATE 5 MG/ML
5 INJECTION INTRAMUSCULAR; INTRAVENOUS EVERY 8 HOURS PRN
Status: DISCONTINUED | OUTPATIENT
Start: 2025-05-21 | End: 2025-05-26

## 2025-05-21 RX ORDER — HEPARIN SODIUM 5000 [USP'U]/ML
5000 INJECTION, SOLUTION INTRAVENOUS; SUBCUTANEOUS EVERY 8 HOURS SCHEDULED
Status: DISCONTINUED | OUTPATIENT
Start: 2025-05-21 | End: 2025-05-26

## 2025-05-21 RX ORDER — ASPIRIN 81 MG/1
81 TABLET ORAL DAILY
Status: DISCONTINUED | OUTPATIENT
Start: 2025-05-21 | End: 2025-05-26

## 2025-05-21 RX ORDER — LACTOBACILLUS ACIDOPHILUS 500MM CELL
1 CAPSULE ORAL DAILY
Status: DISCONTINUED | OUTPATIENT
Start: 2025-05-21 | End: 2025-05-26

## 2025-05-21 RX ORDER — GABAPENTIN 100 MG/1
100 CAPSULE ORAL NIGHTLY
Status: DISCONTINUED | OUTPATIENT
Start: 2025-05-21 | End: 2025-05-26

## 2025-05-21 NOTE — ED INITIAL ASSESSMENT (HPI)
To ED with c/o abdominal pain that radiates to chest and high blood sugar for the past 3 days. Patient states chest feels tights. Patient states blood sugar has been 295 for the past 3 days and states blood sugar has not gone down.

## 2025-05-21 NOTE — ED QUICK NOTES
Orders for admission, patient is aware of plan and ready to go upstairs. Any questions, please call ED ALBERTO marte at extension [phone].     Patient Covid vaccination status: Fully vaccinated     COVID Test Ordered in ED: None    COVID Suspicion at Admission: N/A    Running Infusions: Medication Infusions[1] None    Mental Status/LOC at time of transport: ao x 4    Other pertinent information:   CIWA score: N/A   NIH score:  N/A             [1]

## 2025-05-21 NOTE — ED QUICK NOTES
Rounding Completed    Patient on cardiac,bp,spo2 monitoring  Plan of Care reviewed. Waiting for bed.  Elimination needs assessed.      Bed is locked and in lowest position. Call light within reach.

## 2025-05-21 NOTE — PLAN OF CARE
Problem: Patient Centered Care  Goal: Patient preferences are identified and integrated in the patient's plan of care  Description: Interventions:  - What would you like us to know as we care for you?   - Provide timely, complete, and accurate information to patient/family  - Incorporate patient and family knowledge, values, beliefs, and cultural backgrounds into the planning and delivery of care  - Encourage patient/family to participate in care and decision-making at the level they choose  - Honor patient and family perspectives and choices  Outcome: Progressing     Problem: Patient/Family Goals  Goal: Patient/Family Long Term Goal    Interventions:    - See additional Care Plan goals for specific interventions  Outcome: Progressing  Goal: Patient/Family Short Term Goal      Interventions:     - See additional Care Plan goals for specific interventions  Outcome: Progressing

## 2025-05-21 NOTE — TELEPHONE ENCOUNTER
Action Requested: Summary for Provider     []  Critical Lab, Recommendations Needed  [] Need Additional Advice  []   FYI    []   Need Orders  [] Need Medications Sent to Pharmacy  []  Other     SUMMARY: Patient reports blood glucose running 297-300 past 3 days.  Last night developed sharp pain in \"center of stomach,\" does feel it through to back.  Pain is constant, rated 8 on 10.  Audible discomfort noted.  Per protocol, advised to have someone drive her to Emergency Department now to evaluate abdominal pain.  Patient agreed to plan.    Reason for call: Acute epigastric abdominal pain  Onset: last night    Spoke with patient,  verified.     Reason for Disposition   SEVERE abdominal pain (e.g., excruciating)    Protocols used: Abdominal Pain - Upper-A-[place]

## 2025-05-21 NOTE — H&P
[facility]  part of [facility]    History & Physical    Lissette Block Patient Status:  Emergency    1970 MRN [id number]   Location [facility] EMERGENCY DEPARTMENT Attending Quang Bryant, DO   Hosp Day # 0 PCP PHYSICIAN NONSTAFF     Date:  2025  Date of Admission:  2025    History provided by:Patient and medical records   Chief Complaint:     Chief Complaint   Patient presents with    Chest Pain    Hyperglycemia       HPI:   Lissette Block is a(n) 54 year old female who has a pmh of DM2 who was admitted for severe epigastric pain for the past 3 days. She also reports some chest pain.Blood sugars have also been uncontrolled. She denies fever/chills, sob and nausea/vomiting. CT scan of the abdomen and pelvis revealed no obstruction but mild pancreatitis. IV fluids, IV morphine have been given with minimal relief.  BP in the /70, HR 65, 97.6. Patient drank coffee and eat cookies this morning but the pain persisted. She will be admitted to the medical floor for further treatment and workup.     History   Past Medical History[1]  Past Surgical History[2]  Family History[3]  Social History:  Short Social Hx on File[4]  Allergies/Medications:   Allergies: Allergies[5]  Prescriptions Prior to Admission[6]    Review of Systems:   Constitutional: negative  Eyes: negative  Ears, nose, mouth, throat, and face: negative  Respiratory: negative  Cardiovascular: negative  Gastrointestinal: abdominal pain   Genitourinary:negative  Musculoskeletal:negative  Neurological: negative  Behavioral/Psych: negative  Endocrine: negative    Physical Exam:   Vital Signs:  Blood pressure 126/71, pulse 52, temperature 97.6 °F (36.4 °C), temperature source Temporal, resp. rate 14, height 5' 5\" (1.651 m), weight 185 lb (83.9 kg), SpO2 98%.     GENERAL:  The patient appeared to be in no distress.  Lying in bed, comfortably.  SKIN:  Warm and hydrated  PSYCHIATRIC: Calm and  cooperative   HEENT:  Head was atraumatic and normocephalic.  Eyes:  Extraocular muscles were intact.  Sclera was anicteric.  Pupils were equally reactive to light.  Ears:  There were no lesions.  Nose:  No lesions were noted.  Throat:  There was no thrush, no exudate, no erythema.  There was no evidence of gum bleeding.  NECK:  Supple.  There was no JVD.   CHEST:  Symmetrical movement on inspiration  HEART:  S1 and S2 heard.  RRR   LUNGS:  Air entry was good.  No crackles or wheezes   ABDOMEN: Diffuse abdominal tenderness   MUSCULOSKELETAL:  There was no deformity.  There was full range of motion in all the extremities.   EXTREMITIES: There was no edema, clubbing or cyanosis  NEUROLOGICAL:  There was no focal deficit.  Cranial nerves II through XII were intact.      Results:     Lab Results   Component Value Date    WBC 11.7 (H) 05/21/2025    HGB 14.8 05/21/2025    HCT 44.9 05/21/2025    .0 05/21/2025    CREATSERUM 0.96 05/21/2025    BUN 15 05/21/2025     05/21/2025    K 4.5 05/21/2025     05/21/2025    CO2 22.0 05/21/2025     (H) 05/21/2025    CA 8.9 05/21/2025    ALB 4.4 05/21/2025    ALKPHO 69 05/21/2025    BILT 0.4 05/21/2025    TP 7.5 05/21/2025    AST 59 (H) 05/21/2025    ALT 70 (H) 05/21/2025    TSH 3.010 11/19/2024    LIP 60 (H) 05/21/2025    DDIMER 0.61 (H) 06/10/2024    TROP 0.00 01/26/2018    B12 816 04/08/2024       Recent Labs   Lab 05/21/25  1046   RBC 5.32*   HGB 14.8   HCT 44.9   MCV 84.4   MCH 27.8   MCHC 33.0   RDW 13.0   NEPRELIM 5.99   WBC 11.7*   .0       Recent Labs   Lab 05/21/25  1046   *   BUN 15   CREATSERUM 0.96   CA 8.9   ALB 4.4      K 4.5      CO2 22.0   ALKPHO 69   AST 59*   ALT 70*   BILT 0.4   TP 7.5       CT ABDOMEN+PELVIS(CONTRAST ONLY)(CPT=74177)  Result Date: 5/21/2025  CONCLUSION:   Faint fat stranding adjacent to the distal pancreatic body/pancreatic tail which could reflect mild acute pancreatitis.  Advise lipase  correlation.  Cholecystectomy.  1.6 cm right adnexal cyst.  Follow-up nonemergent pelvic ultrasound recommended.  Lesser incidental findings as above.      Dictated by (CST): Cristi Baez MD on 5/21/2025 at 12:11 PM     Finalized by (CST): Cristi Baez MD on 5/21/2025 at 12:16 PM          EKG 12 Lead  Result Date: 5/21/2025  Normal sinus rhythm Normal ECG When compared with ECG of 24-APR-2025 12:56, No significant change was found      Assessment/Plan:      Acute epigastric pain  - secondary to acute pancreatitis  - IV fluids  - Pain control with IV morphine  - CLD  - bowel rest   - Repeat labs and lipase in AM    DM2  - SSI low dose  - accuechecks    DVT proph- heparin    Full    MDM. High      RORY BROWN MD  5/21/2025           [1]   Past Medical History:   Depression    Diabetes (HCC)    Esophageal reflux    High blood pressure    High cholesterol    Migraines    PONV (postoperative nausea and vomiting)    Sleep apnea    NO MACHINE/TREATMENT    Stroke (HCC)    TIA    Visual impairment    CONTACTS/GLASSES   [2]   Past Surgical History:  Procedure Laterality Date    Appendectomy  02/22/2024    Cholecystectomy  01/01/1991    Hysterectomy  01/01/2013    partial hysterectomy, has ovaries    Removal of ovary(s) Right 02/22/2024    RT ovary    Tubal ligation  18 years ago   [3]   Family History  Problem Relation Age of Onset    Hypertension Mother     Diabetes Father     Prostate Cancer Father 65    Cancer Paternal Grandmother     Breast Cancer Neg     Ovarian Cancer Neg     Pancreatic Cancer Neg    [4]   Social History  Socioeconomic History    Marital status:    Tobacco Use    Smoking status: Former     Types: Cigarettes    Smokeless tobacco: Former     Quit date: 12/29/2013   Vaping Use    Vaping status: Never Used   Substance and Sexual Activity    Alcohol use: Not Currently     Alcohol/week: 0.0 - 1.0 standard drinks of alcohol     Comment: SOCIAL    Drug use: No     Social Drivers of Health      Food Insecurity: Not At Risk (8/12/2023)    Received from [facility]    Food Insecurity     RETIRE How often in the past 12 months would you say you are worried or stressed about having enough money to buy nutritious meals? : Never   Transportation Needs: No Transportation Needs (8/12/2023)    Received from [facility]    PRAPARE - Transportation     In the past 12 months, has lack of transportation kept you from medical appointments or from getting medications?: No     In the past 12 months, has lack of transportation kept you from meetings, work, or from getting things needed for daily living?: No   [5]   Allergies  Allergen Reactions    Lexapro [Escitalopram] SWELLING and Tightness in Throat   [6] (Not in a hospital admission)

## 2025-05-21 NOTE — ED PROVIDER NOTES
Patient Seen in: [facility] Emergency Department    History     Chief Complaint   Patient presents with    Chest Pain    Hyperglycemia       HPI    54-year-old female presents ER with complaint of epigastric pain and right upper quadrant abdominal pain.  Patient with a past medical history of gastritis and cholecystectomy.  Patient states he started having pain yesterday.  Patient unable to tolerate p.o.  Patient also complained of nausea.    History reviewed. Past Medical History[1]    History reviewed. Past Surgical History[2]      Medications :  Prescriptions Prior to Admission[3]     Family History[4]    Smoking Status: Social Hx on file[5]    ROS  All pertinent positives for the review of systems are mentioned in the HPI  All other organ systems are reviewed and are negative.    Constitutional and vital signs reviewed.      Social History and Family History elements reviewed from today, pertinent positives to the presenting problem noted.    Physical Exam     ED Triage Vitals [05/21/25 0938]   /74   Pulse 66   Resp 20   Temp 97.6 °F (36.4 °C)   Temp src Temporal   SpO2 97 %   O2 Device        All measures to prevent infection transmission during my interaction with the patient were taken. The patient was already wearing a droplet mask on my arrival to the room. Personal protective equipment including droplet mask, eye protection, and gloves were worn throughout the duration of the exam.  Handwashing was performed prior to and after the exam.  Stethoscope and any equipment used during my examination was cleaned with super sani-cloth germicidal wipes following the exam.     Physical Exam  Vitals and nursing note reviewed.   Constitutional:       Appearance: She is well-developed.   HENT:      Head: Normocephalic and atraumatic.      Right Ear: External ear normal.      Left Ear: External ear normal.      Nose: Nose normal.   Eyes:      Conjunctiva/sclera: Conjunctivae normal.      Pupils: Pupils are  equal, round, and reactive to light.   Cardiovascular:      Rate and Rhythm: Normal rate and regular rhythm.      Heart sounds: Normal heart sounds.   Pulmonary:      Effort: Pulmonary effort is normal.      Breath sounds: Normal breath sounds.   Abdominal:      General: Bowel sounds are normal.      Palpations: Abdomen is soft.      Tenderness: There is abdominal tenderness in the right upper quadrant and epigastric area.   Musculoskeletal:         General: Normal range of motion.      Cervical back: Normal range of motion and neck supple.   Skin:     General: Skin is warm and dry.   Neurological:      Mental Status: She is alert and oriented to person, place, and time.      Deep Tendon Reflexes: Reflexes are normal and symmetric.   Psychiatric:         Behavior: Behavior normal.         Thought Content: Thought content normal.         Judgment: Judgment normal.         ED Course        Labs Reviewed   COMP METABOLIC PANEL (14) - Abnormal; Notable for the following components:       Result Value    Glucose 264 (*)     ALT 70 (*)     AST 59 (*)     All other components within normal limits   CBC WITH DIFFERENTIAL WITH PLATELET - Abnormal; Notable for the following components:    WBC 11.7 (*)     RBC 5.32 (*)     Monocyte Absolute 1.25 (*)     All other components within normal limits   LIPASE - Abnormal; Notable for the following components:    Lipase 60 (*)     All other components within normal limits   POCT GLUCOSE - Abnormal; Notable for the following components:    POC Glucose  271 (*)     All other components within normal limits   TROPONIN I HIGH SENSITIVITY - Normal   ACETONE   TRIGLYCERIDES   RAINBOW DRAW BLUE     EKG    Rate, intervals and axes as noted on EKG Report.  Rate: 63  Rhythm: Sinus Rhythm  Reading: No ST deviation, normal axis, unchanged from previous EKG on 4/24/2025             Imaging Results Available and Reviewed while in ED: CT ABDOMEN+PELVIS(CONTRAST ONLY)(CPT=74177)  Result Date:  5/21/2025  CONCLUSION:   Faint fat stranding adjacent to the distal pancreatic body/pancreatic tail which could reflect mild acute pancreatitis.  Advise lipase correlation.  Cholecystectomy.  1.6 cm right adnexal cyst.  Follow-up nonemergent pelvic ultrasound recommended.  Lesser incidental findings as above.      Dictated by (CST): Cristi Baez MD on 5/21/2025 at 12:11 PM     Finalized by (CST): Cristi Baez MD on 5/21/2025 at 12:16 PM          ED Medications Administered:   Medications   morphINE PF 4 MG/ML injection 4 mg (has no administration in time range)   ketorolac (Toradol) 30 MG/ML injection 30 mg (has no administration in time range)   sodium chloride 0.9 % IV bolus 1,000 mL (0 mL Intravenous Stopped 5/21/25 1227)   famotidine (Pepcid) 20 mg/2mL injection 20 mg (20 mg Intravenous Given 5/21/25 1050)   mylanta-dicyclomine-lidocaine 2% (G.I. Cocktail) oral liquid ( Oral Given 5/21/25 1050)   morphINE PF 4 MG/ML injection 4 mg (4 mg Intravenous Given 5/21/25 1129)   iopamidol 76% (ISOVUE-370) injection for power injector (80 mL Intravenous Given 5/21/25 1159)         MDM     Vitals:    05/21/25 0938 05/21/25 1100 05/21/25 1200 05/21/25 1230   BP: 119/74 129/71 134/70 126/71   Pulse: 66 57 65 52   Resp: 20 24 16 14   Temp: 97.6 °F (36.4 °C)      TempSrc: Temporal      SpO2: 97% 98% 96% 98%   Weight: 83.9 kg      Height: 165.1 cm (5' 5\")        *I personally reviewed and interpreted all ED vitals.  I also personally reviewed all labs and imaging if ordered    Pulse Ox: 98%, Room air, Normal     Monitor Interpretation:   normal sinus rhythm    Differential Diagnosis/ Diagnostic Considerations: Gastritis, pancreatitis, colitis    Medical Record Review: I personally reviewed available prior medical records for any recent pertinent discharge summaries, testing, and procedures and reviewed those reports.    Complicating Factors: The patient already has does not have any pertinent problems on file. to  contribute to the complexity of this ED evaluation.    Medical Decision Making  54-year-old female presents ER with complaint of epigastric pain.  Patient with nausea as well as 10 out of 10 pain in the ER.  Patient with slightly elevated lipase and CT abdomen pelvis shows mild pancreatitis.  Discussed with patient's who states that she is too uncomfortable to be discharged home.  Patient given IV fluids as well as morphine and Toradol.  Joes hospitalist notified of the admission.    Total Critical Care Time: 32 minutes including time spent examining and re-evaluating the patient, ordering and reviewing laboratory tests, documenting, reviewing previous records, obtaining information from the family, and speaking with consultants, admitting doctors, nurses and medics and excludes any time spent on procedures.      Problems Addressed:  Acute pancreatitis, unspecified complication status, unspecified pancreatitis type (HCC): acute illness or injury    Amount and/or Complexity of Data Reviewed  Labs: ordered. Decision-making details documented in ED Course.  Radiology: ordered and independent interpretation performed. Decision-making details documented in ED Course.     Details: The abdomen pelvis reviewed by myself shows no bowel obstruction or constipation.        Condition upon leaving the department: Stable    Disposition and Plan     Clinical Impression:  1. Acute pancreatitis, unspecified complication status, unspecified pancreatitis type (HCC)        Disposition:  Admit    Follow-up:  No follow-up provider specified.    Medications Prescribed:  Current Discharge Medication List          Hospital Problems       Present on Admission  Date Reviewed: 5/19/2025          ICD-10-CM Noted POA    * (Principal) Acute pancreatitis, unspecified complication status, unspecified pancreatitis type (HCC) K85.90 5/21/2025 Unknown             [1]   Past Medical History:   Depression    Diabetes (HCC)    Esophageal reflux    High  blood pressure    High cholesterol    Migraines    PONV (postoperative nausea and vomiting)    Sleep apnea    NO MACHINE/TREATMENT    Stroke (HCC)    TIA    Visual impairment    CONTACTS/GLASSES   [2]   Past Surgical History:  Procedure Laterality Date    Appendectomy  02/22/2024    Cholecystectomy  01/01/1991    Hysterectomy  01/01/2013    partial hysterectomy, has ovaries    Removal of ovary(s) Right 02/22/2024    RT ovary    Tubal ligation  18 years ago   [3] (Not in a hospital admission)   [4]   Family History  Problem Relation Age of Onset    Hypertension Mother     Diabetes Father     Prostate Cancer Father 65    Cancer Paternal Grandmother     Breast Cancer Neg     Ovarian Cancer Neg     Pancreatic Cancer Neg    [5]   Social History  Socioeconomic History    Marital status:    Tobacco Use    Smoking status: Former     Types: Cigarettes    Smokeless tobacco: Former     Quit date: 12/29/2013   Vaping Use    Vaping status: Never Used   Substance and Sexual Activity    Alcohol use: Not Currently     Alcohol/week: 0.0 - 1.0 standard drinks of alcohol     Comment: SOCIAL    Drug use: No

## 2025-05-22 LAB
ALBUMIN SERPL-MCNC: 3.8 G/DL (ref 3.2–4.8)
ALBUMIN/GLOB SERPL: 1.5 {RATIO} (ref 1–2)
ALP LIVER SERPL-CCNC: 48 U/L (ref 41–108)
ALT SERPL-CCNC: 65 U/L (ref 10–49)
ANION GAP SERPL CALC-SCNC: 8 MMOL/L (ref 0–18)
AST SERPL-CCNC: 47 U/L (ref ?–34)
BASOPHILS # BLD AUTO: 0.08 X10(3) UL (ref 0–0.2)
BASOPHILS NFR BLD AUTO: 0.8 %
BILIRUB SERPL-MCNC: 0.4 MG/DL (ref 0.3–1.2)
BUN BLD-MCNC: 12 MG/DL (ref 9–23)
BUN/CREAT SERPL: 15.8 (ref 10–20)
CALCIUM BLD-MCNC: 8.3 MG/DL (ref 8.7–10.4)
CHLORIDE SERPL-SCNC: 108 MMOL/L (ref 98–112)
CO2 SERPL-SCNC: 24 MMOL/L (ref 21–32)
CREAT BLD-MCNC: 0.76 MG/DL (ref 0.55–1.02)
DEPRECATED RDW RBC AUTO: 41.8 FL (ref 35.1–46.3)
EGFRCR SERPLBLD CKD-EPI 2021: 94 ML/MIN/1.73M2 (ref 60–?)
EOSINOPHIL # BLD AUTO: 0.19 X10(3) UL (ref 0–0.7)
EOSINOPHIL NFR BLD AUTO: 1.8 %
ERYTHROCYTE [DISTWIDTH] IN BLOOD BY AUTOMATED COUNT: 12.9 % (ref 11–15)
GLOBULIN PLAS-MCNC: 2.6 G/DL (ref 2–3.5)
GLUCOSE BLD-MCNC: 113 MG/DL (ref 70–99)
GLUCOSE BLDC GLUCOMTR-MCNC: 110 MG/DL (ref 70–99)
GLUCOSE BLDC GLUCOMTR-MCNC: 121 MG/DL (ref 70–99)
GLUCOSE BLDC GLUCOMTR-MCNC: 123 MG/DL (ref 70–99)
GLUCOSE BLDC GLUCOMTR-MCNC: 152 MG/DL (ref 70–99)
HCT VFR BLD AUTO: 42.1 % (ref 35–48)
HGB BLD-MCNC: 13.4 G/DL (ref 12–16)
IMM GRANULOCYTES # BLD AUTO: 0.08 X10(3) UL (ref 0–1)
IMM GRANULOCYTES NFR BLD: 0.8 %
LIPASE SERPL-CCNC: 54 U/L (ref 12–53)
LYMPHOCYTES # BLD AUTO: 3.55 X10(3) UL (ref 1–4)
LYMPHOCYTES NFR BLD AUTO: 33.7 %
MCH RBC QN AUTO: 28 PG (ref 26–34)
MCHC RBC AUTO-ENTMCNC: 31.8 G/DL (ref 31–37)
MCV RBC AUTO: 88.1 FL (ref 80–100)
MONOCYTES # BLD AUTO: 1.29 X10(3) UL (ref 0.1–1)
MONOCYTES NFR BLD AUTO: 12.3 %
NEUTROPHILS # BLD AUTO: 5.33 X10 (3) UL (ref 1.5–7.7)
NEUTROPHILS # BLD AUTO: 5.33 X10(3) UL (ref 1.5–7.7)
NEUTROPHILS NFR BLD AUTO: 50.6 %
OSMOLALITY SERPL CALC.SUM OF ELEC: 291 MOSM/KG (ref 275–295)
PLATELET # BLD AUTO: 259 10(3)UL (ref 150–450)
POTASSIUM SERPL-SCNC: 4.1 MMOL/L (ref 3.5–5.1)
PROT SERPL-MCNC: 6.4 G/DL (ref 5.7–8.2)
RBC # BLD AUTO: 4.78 X10(6)UL (ref 3.8–5.3)
SODIUM SERPL-SCNC: 140 MMOL/L (ref 136–145)
WBC # BLD AUTO: 10.5 X10(3) UL (ref 4–11)

## 2025-05-22 PROCEDURE — 99233 SBSQ HOSP IP/OBS HIGH 50: CPT | Performed by: HOSPITALIST

## 2025-05-22 PROCEDURE — 99223 1ST HOSP IP/OBS HIGH 75: CPT | Performed by: INTERNAL MEDICINE

## 2025-05-22 RX ORDER — HYDROCODONE BITARTRATE AND ACETAMINOPHEN 5; 325 MG/1; MG/1
1 TABLET ORAL EVERY 6 HOURS PRN
Refills: 0 | Status: DISCONTINUED | OUTPATIENT
Start: 2025-05-22 | End: 2025-05-26

## 2025-05-22 RX ORDER — LORAZEPAM 2 MG/ML
1 INJECTION INTRAMUSCULAR
Status: ACTIVE | OUTPATIENT
Start: 2025-05-22 | End: 2025-05-23

## 2025-05-22 RX ORDER — DEXTROSE MONOHYDRATE AND SODIUM CHLORIDE 5; .9 G/100ML; G/100ML
INJECTION, SOLUTION INTRAVENOUS CONTINUOUS
Status: DISCONTINUED | OUTPATIENT
Start: 2025-05-22 | End: 2025-05-26

## 2025-05-22 NOTE — PLAN OF CARE
Problem: GASTROINTESTINAL - ADULT  Goal: Minimal or absence of nausea and vomiting  Description: INTERVENTIONS:  - Maintain adequate hydration with IV or PO as ordered and tolerated  - Nasogastric tube to low intermittent suction as ordered  - Evaluate effectiveness of ordered antiemetic medications  - Provide nonpharmacologic comfort measures as appropriate  - Advance diet as tolerated, if ordered  - Obtain nutritional consult as needed  - Evaluate fluid balance  Outcome: Progressing     Problem: SKIN/TISSUE INTEGRITY - ADULT  Goal: Skin integrity remains intact  Description: INTERVENTIONS  - Assess and document risk factors for pressure ulcer development  - Assess and document skin integrity  - Monitor for areas of redness and/or skin breakdown  - Initiate interventions, skin care algorithm/standards of care as needed  Outcome: Progressing     Problem: PAIN - ADULT  Goal: Verbalizes/displays adequate comfort level or patient's stated pain goal  Description: INTERVENTIONS:  - Encourage pt to monitor pain and request assistance  - Assess pain using appropriate pain scale  - Administer analgesics based on type and severity of pain and evaluate response  - Implement non-pharmacological measures as appropriate and evaluate response  - Consider cultural and social influences on pain and pain management  - Manage/alleviate anxiety  - Utilize distraction and/or relaxation techniques  - Monitor for opioid side effects  - Notify MD/LIP if interventions unsuccessful or patient reports new pain  - Anticipate increased pain with activity and pre-medicate as appropriate  Outcome: Not Progressing

## 2025-05-22 NOTE — PROGRESS NOTES
[facility]  part of [facility]    Progress Note    Lissette Block Patient Status:  Inpatient    1970 MRN [id number]   Location [facility]5W Attending Eboni Capone MD   Hosp Day # 1 PCP PHYSICIAN NONSTAFF       Subjective:   Lissette Block is a(n) 54 year old female who is feeling ok. Still in a lot of pain. No fevers. Cannot tolerate a diet.     Objective:   Blood pressure 124/78, pulse 59, temperature 98.1 °F (36.7 °C), temperature source Oral, resp. rate 22, height 5' 5\" (1.651 m), weight 197 lb 8 oz (89.6 kg), SpO2 94%.    Physical Exam:    General: No acute distress.   Respiratory: Clear to auscultation bilaterally. No wheezes. No rhonchi.  Cardiovascular: S1, S2. Regular rate and rhythm. No murmurs, rubs or gallops.   Abdomen: Soft, nontender, nondistended.  Positive bowel sounds. No rebound or guarding.  Neurologic: No focal neurological deficits.   Musculoskeletal: Moves all extremities.  Extremities: No edema.    Results:     Lab Results   Component Value Date    WBC 10.5 2025    HGB 13.4 2025    HCT 42.1 2025    .0 2025    CREATSERUM 0.76 2025    BUN 12 2025     2025    K 4.1 2025     2025    CO2 24.0 2025     (H) 2025    CA 8.3 (L) 2025    ALB 3.8 2025    ALKPHO 48 2025    BILT 0.4 2025    TP 6.4 2025    AST 47 (H) 2025    ALT 65 (H) 2025    TSH 3.010 2024    LIP 54 (H) 2025    DDIMER 0.61 (H) 06/10/2024    TROP 0.00 2018    B12 816 2024       CT ABDOMEN+PELVIS(CONTRAST ONLY)(CPT=74177)  Result Date: 2025  CONCLUSION:   Faint fat stranding adjacent to the distal pancreatic body/pancreatic tail which could reflect mild acute pancreatitis.  Advise lipase correlation.  Cholecystectomy.  1.6 cm right adnexal cyst.  Follow-up nonemergent pelvic ultrasound recommended.  Lesser incidental findings as  above.      Dictated by (CST): Cristi Baez MD on 5/21/2025 at 12:11 PM     Finalized by (CST): Cristi Baez MD on 5/21/2025 at 12:16 PM          EKG 12 Lead  Result Date: 5/21/2025  Normal sinus rhythm Normal ECG When compared with ECG of 24-APR-2025 12:56, No significant change was found Confirmed by PAULO YORK ([id number]) on 5/21/2025 2:10:23 PM      Assessment and Plan:       Acute epigastric pain  - secondary to acute pancreatitis  - IV fluids  - Pain control with IV morphine  - NPO  - gI consult may need MRCP   - bowel rest   - Repeat labs and lipase in AM     DM2  - SSI low dose  - accuechecks     DVT proph- heparin     Full     MDM. High      RORY BROWN MD  05/22/25

## 2025-05-22 NOTE — CONSULTS
Is this a shared or split note between Advanced Practice Provider and Physician? Yes       [facility]   Gastroenterology Consultation Note    Lissette Block  Patient Status:    Inpatient  Date of Admission:         5/21/2025, Hospital day #1  Attending:   Eboni Capone MD  PCP:     PHYSICIAN NONSTAFF    Reason for Consultation:  Acute pancreatitis     History of Present Illness:  Lissette Block is a 54 year old female w/ PMHx of diabetes who presented to ER for 2 days of worsening epigastric pain. She notes pain has been constant a sharp. Patient tried to eat at home and had worsening pain. She had mild nausea, no vomiting. Notes prior to two days ago, was tolerating diet well. No decreased appetite or weight loss.   Denies acid reflux, dysphagia and globus. Denies bloating and changes in bowel habits.   Denies fevers and chills at home.     No ETOH or tobacco use. No family hx of pancreatic cancer.     Pertinent Family Hx:  - No known history of esophageal, gastric or colon cancers  - No known IBD  - No known liver pathologies    Endoscopy Hx:  - none    Social Hx:  - No tobacco use/No ETOH  - Denies illicit drug use  - Lives with: family   - Occupation: Works at [employer]       History:  Past Medical History[1]  Past Surgical History[2]  Family History[3]   reports that she has quit smoking. Her smoking use included cigarettes. She quit smokeless tobacco use about 11 years ago. She reports that she does not currently use alcohol. She reports that she does not use drugs.    Allergies:  Allergies[4]    Medications:  Current Hospital Medications[5]    Review of Systems:   CONSTITUTIONAL:  negative for fevers, chills, unintentional weight loss   EYES:  negative for diplopia or change in vision   RESPIRATORY:  negative for severe shortness of breath  CARDIOVASCULAR:  negative for crushing sub-sternal chest pain  GASTROINTESTINAL:  see HPI  GENITOURINARY:  negative for dysuria or gross  hematuria  INTEGUMENT/BREAST:  SKIN:  negative for jaundice or new rash   ALLERGIC/IMMUNOLOGIC:  negative for hay fever   ENDOCRINE:  negative for cold intolerance and heat intolerance  MUSCULOSKELETAL:  negative for joint effusion/severe erythema  NEURO: negative for new loss of consciousness or dizziness   BEHAVIOR/PSYCH:  negative for psychotic behavior    Physical Exam:    Blood pressure 124/78, pulse 59, temperature 98.1 °F (36.7 °C), temperature source Oral, resp. rate 22, height 5' 5\" (1.651 m), weight 197 lb 8 oz (89.6 kg), SpO2 94%. Body mass index is 32.87 kg/m².    Gen: awake, alert patient, NAD  HEENT: EOMI, the sclera appears anicteric, oropharynx clear, mucus membranes appear moist  CV: RRR  Lung: no conversational dyspnea   Abdomen: TTP epigastric region, no rebound or guarding. soft ND abdomen with NABS appreciated   Back: No CVA tenderness   Skin: dry, warm, no jaundice  Ext: no LE edema is evident  Neuro: Alert and interactive  Psych: calm, cooperative    Laboratory Data:  Lab Results   Component Value Date    WBC 10.5 05/22/2025    HGB 13.4 05/22/2025    HCT 42.1 05/22/2025    .0 05/22/2025    CREATSERUM 0.76 05/22/2025    BUN 12 05/22/2025     05/22/2025    K 4.1 05/22/2025     05/22/2025    CO2 24.0 05/22/2025     05/22/2025    CA 8.3 05/22/2025    ALB 3.8 05/22/2025    ALKPHO 48 05/22/2025    BILT 0.4 05/22/2025    TP 6.4 05/22/2025    AST 47 05/22/2025    ALT 65 05/22/2025    LIP 54 05/22/2025       Imaging:  CT ABDOMEN+PELVIS(CONTRAST ONLY)(CPT=74177)  Result Date: 5/21/2025  CONCLUSION:   Faint fat stranding adjacent to the distal pancreatic body/pancreatic tail which could reflect mild acute pancreatitis.  Advise lipase correlation.  Cholecystectomy.  1.6 cm right adnexal cyst.  Follow-up nonemergent pelvic ultrasound recommended.  Lesser incidental findings as above.      Dictated by (CST): Cristi Baez MD on 5/21/2025 at 12:11 PM     Finalized by (CST):  Cristi Baez MD on 5/21/2025 at 12:16 PM            Assessment & Plan   Lissette Block is a 54 year old female w/ PMHx of diabetes who presented to ER for 2 days of worsening epigastric pain. She notes pain has been constant a sharp. Patient tried to eat at home and had worsening pain. She had mild nausea, no vomiting. Notes prior to two days ago, was tolerating diet well. No decreased appetite or weight loss.     #acute pancreatitis   - ? Passed sludge or stone  - triglycerides 300, no ETOH or tobacco use  - 2 days of acute epigastric pain with nausea  - plan for MRCP to further evaluate for passed stone sludge, pancreatic abnormality, IGG4 ordered as well   - supportive care with IVF, pain and anti emetic control per primary     Recommend:  -IVF   -pain control per primary   -MRCP  -NPO   -antiemetics per primary     Thank you for the opportunity to participate in the care of this patient.    Case discussed with Kyra Soriano MD and Aline DOMINGUEZ.    Viola Rader PA-C  [facility]  5/22/2025         [1]   Past Medical History:   Depression    Diabetes (HCC)    Esophageal reflux    High blood pressure    High cholesterol    Migraines    PONV (postoperative nausea and vomiting)    Sleep apnea    NO MACHINE/TREATMENT    Stroke (HCC)    TIA    Visual impairment    CONTACTS/GLASSES   [2]   Past Surgical History:  Procedure Laterality Date    Appendectomy  02/22/2024    Cholecystectomy  01/01/1991    Hysterectomy  01/01/2013    partial hysterectomy, has ovaries    Removal of ovary(s) Right 02/22/2024    RT ovary    Tubal ligation  18 years ago   [3]   Family History  Problem Relation Age of Onset    Hypertension Mother     Diabetes Father     Prostate Cancer Father 65    Cancer Paternal Grandmother     Breast Cancer Neg     Ovarian Cancer Neg     Pancreatic Cancer Neg    [4]   Allergies  Allergen Reactions    Lexapro [Escitalopram] SWELLING and Tightness in Throat   [5]   Current  Facility-Administered Medications:     piperacillin-tazobactam (Zosyn) 3.375 g in dextrose 5% 100 mL IVPB-ADDV, 3.375 g, Intravenous, Q8H    HYDROcodone-acetaminophen (Norco) 5-325 MG per tab 1 tablet, 1 tablet, Oral, Q6H PRN    dextrose 5%-sodium chloride 0.9% infusion, , Intravenous, Continuous    heparin (Porcine) 5000 UNIT/ML injection 5,000 Units, 5,000 Units, Subcutaneous, Q8H MARSHAL    ondansetron (Zofran) 4 MG/2ML injection 4 mg, 4 mg, Intravenous, Q6H PRN    prochlorperazine (Compazine) 10 MG/2ML injection 5 mg, 5 mg, Intravenous, Q8H PRN    morphINE PF 2 MG/ML injection 1 mg, 1 mg, Intravenous, Q2H PRN **OR** morphINE PF 2 MG/ML injection 2 mg, 2 mg, Intravenous, Q2H PRN **OR** morphINE PF 4 MG/ML injection 4 mg, 4 mg, Intravenous, Q2H PRN    glucose (Dex4) 15 GM/59ML oral liquid 15 g, 15 g, Oral, Q15 Min PRN **OR** glucose (Glutose) 40% oral gel 15 g, 15 g, Oral, Q15 Min PRN **OR** glucose-vitamin C (Dex-4) chewable tab 4 tablet, 4 tablet, Oral, Q15 Min PRN **OR** dextrose 50% injection 50 mL, 50 mL, Intravenous, Q15 Min PRN **OR** glucose (Dex4) 15 GM/59ML oral liquid 30 g, 30 g, Oral, Q15 Min PRN **OR** glucose (Glutose) 40% oral gel 30 g, 30 g, Oral, Q15 Min PRN **OR** glucose-vitamin C (Dex-4) chewable tab 8 tablet, 8 tablet, Oral, Q15 Min PRN    insulin aspart (NovoLOG) 100 Units/mL FlexPen 1-11 Units, 1-11 Units, Subcutaneous, TID CC    glucose (Dex4) 15 GM/59ML oral liquid 15 g, 15 g, Oral, Q15 Min PRN **OR** glucose (Glutose) 40% oral gel 15 g, 15 g, Oral, Q15 Min PRN **OR** dextrose 50% injection 50 mL, 50 mL, Intravenous, Q15 Min PRN **OR** glucose (Dex4) 15 GM/59ML oral liquid 30 g, 30 g, Oral, Q15 Min PRN **OR** glucose (Glutose) 40% oral gel 30 g, 30 g, Oral, Q15 Min PRN    acidophilus (Probiotic) cap/tab 1 capsule, 1 capsule, Oral, Daily    aspirin DR tab 81 mg, 81 mg, Oral, Daily    gabapentin (Neurontin) cap 100 mg, 100 mg, Oral, Nightly    pantoprazole (Protonix) DR tab 40 mg, 40 mg, Oral,  QAM AC

## 2025-05-22 NOTE — DISCHARGE INSTRUCTIONS
Diet instructions:   continue a light low-fat diet, advance slowly from full liquids back to solids       Depression and Anxiety   There is often a link between how someone is feeling physically and their emotional wellbeing. It isn’t unusual to experience depression or anxiety after a physical illness, major surgery or traumatic situation. It also isn’t unusual for someone dealing with anxiety or depression to develop other physical symptoms. The following tables will give you some comparisons between the physical and emotional symptoms of depression and anxiety.  Depression  Mood disorders like depression are quite common. It is not unusual for someone who is depressed to experience both physical and emotional symptoms. A primary care physician may help you understand if your physical symptoms are related to a recent physical illness, stress, emotional turmoil, or an unexpected trauma.  Physical Symptoms:   * Changes in appetite Changes in sleep patterns,Fatigue, Persistent unexplained aches and pains, Headaches, Chest pain, Digestive problems  Behavioral Symptoms:   * Sadness, Increased irritability, Easily frustrated, Low self-esteem, Loss of interest in, pleasurable activities, Poor concentration, Suicidal thoughts  Anxiety  Stress and anxiety go hand in hand. From generalized anxiety to phobic disorders and panic attacks, these behavioral issues can cause both physical and emotional symptoms. Your feelings of anxiety could be caused by a recent physical illness, stress, emotional distress or feelings associated with an unexpected trauma. As with other mental illnesses, especially anxiety disorders, allow your physician to determine the cause of your physical health symptoms.  Physical Symptoms:   * Trouble falling or staying asleep, Heart palpitations, Trembling, Irritability, Sweating/flushing, Frequent urination or diarrhea, Being easily startled  Behavioral Symptoms:   * Persistent state of apprehension or  fear, Feelings of dread without valid cause, Irritability or edginess, Intense/sudden feelings of panic or doom,    Feelings of detachment and unreality, Catastrophic thinking, Hyper-vigilance towards signs of danger  We’re here to help  Once the cause of your physical symptoms has been determined, your physician may recommend you see a psychiatrist, psychologist or a counselor for additional support. [facility] offers a free and confidential behavioral health assessment and specialized services at our locations in [facility] and [place].  For more information, call the [facility] Help Line, available 24/7, at [phone] or visit   [email or web address].

## 2025-05-22 NOTE — PLAN OF CARE
Problem: Patient Centered Care  Goal: Patient preferences are identified and integrated in the patient's plan of care  Description: Interventions:  - What would you like us to know as we care for you? From home   - Provide timely, complete, and accurate information to patient/family  - Incorporate patient and family knowledge, values, beliefs, and cultural backgrounds into the planning and delivery of care  - Encourage patient/family to participate in care and decision-making at the level they choose  - Honor patient and family perspectives and choices  Outcome: Progressing     Problem: Patient/Family Goals  Goal: Patient/Family Long Term Goal  Description: Patient's Long Term Goal: discharge     Interventions:  - monitor labs, monitor vitals, abx per orders   - See additional Care Plan goals for specific interventions  Outcome: Progressing  Goal: Patient/Family Short Term Goal  Description: Patient's Short Term Goal: feel better     Interventions:   - Ivf per orders, prn pain medications   - See additional Care Plan goals for specific interventions  Outcome: Progressing     Problem: GASTROINTESTINAL - ADULT  Goal: Minimal or absence of nausea and vomiting  Description: INTERVENTIONS:  - Maintain adequate hydration with IV or PO as ordered and tolerated  - Nasogastric tube to low intermittent suction as ordered  - Evaluate effectiveness of ordered antiemetic medications  - Provide nonpharmacologic comfort measures as appropriate  - Advance diet as tolerated, if ordered  - Obtain nutritional consult as needed  - Evaluate fluid balance  Outcome: Progressing     Problem: SKIN/TISSUE INTEGRITY - ADULT  Goal: Skin integrity remains intact  Description: INTERVENTIONS  - Assess and document risk factors for pressure ulcer development  - Assess and document skin integrity  - Monitor for areas of redness and/or skin breakdown  - Initiate interventions, skin care algorithm/standards of care as needed  Outcome: Progressing      Problem: PAIN - ADULT  Goal: Verbalizes/displays adequate comfort level or patient's stated pain goal  Description: INTERVENTIONS:  - Encourage pt to monitor pain and request assistance  - Assess pain using appropriate pain scale  - Administer analgesics based on type and severity of pain and evaluate response  - Implement non-pharmacological measures as appropriate and evaluate response  - Consider cultural and social influences on pain and pain management  - Manage/alleviate anxiety  - Utilize distraction and/or relaxation techniques  - Monitor for opioid side effects  - Notify MD/LIP if interventions unsuccessful or patient reports new pain  - Anticipate increased pain with activity and pre-medicate as appropriate  Outcome: Progressing

## 2025-05-22 NOTE — PAYOR COMM NOTE
--------------  ADMISSION REVIEW     Payor: [facility]  Subscriber #:  [id number]  Authorization Number: [id number]    Admit date: 5/21/25  Admit time:  6:20 PM       REVIEW DOCUMENTATION:     ED Provider Notes        ED Provider Notes signed by Quang Bryant DO at 5/21/2025  1:08 PM       Author: Quang Bryant DO Service: -- Author Type: Physician    Filed: 5/21/2025  1:08 PM Date of Service: 5/21/2025 11:38 AM Status: Signed    : Quang Bryant DO (Physician)         Patient Seen in: Health system Emergency Department    History     Chief Complaint   Patient presents with    Chest Pain    Hyperglycemia       HPI    54-year-old female presents ER with complaint of epigastric pain and right upper quadrant abdominal pain.  Patient with a past medical history of gastritis and cholecystectomy.  Patient states he started having pain yesterday.  Patient unable to tolerate p.o.  Patient also complained of nausea.    ROS  All pertinent positives for the review of systems are mentioned in the HPI  All other organ systems are reviewed and are negative.    Constitutional and vital signs reviewed.      Social History and Family History elements reviewed from today, pertinent positives to the presenting problem noted.    Physical Exam     ED Triage Vitals [05/21/25 0938]   /74   Pulse 66   Resp 20   Temp 97.6 °F (36.4 °C)   Temp src Temporal   SpO2 97 %   O2 Device        All measures to prevent infection transmission during my interaction with the patient were taken. The patient was already wearing a droplet mask on my arrival to the room. Personal protective equipment including droplet mask, eye protection, and gloves were worn throughout the duration of the exam.  Handwashing was performed prior to and after the exam.  Stethoscope and any equipment used during my examination was cleaned with super sani-cloth germicidal wipes following the exam.     Physical Exam  Vitals and nursing note reviewed.    Constitutional:       Appearance: She is well-developed.   HENT:      Head: Normocephalic and atraumatic.      Right Ear: External ear normal.      Left Ear: External ear normal.      Nose: Nose normal.   Eyes:      Conjunctiva/sclera: Conjunctivae normal.      Pupils: Pupils are equal, round, and reactive to light.   Cardiovascular:      Rate and Rhythm: Normal rate and regular rhythm.      Heart sounds: Normal heart sounds.   Pulmonary:      Effort: Pulmonary effort is normal.      Breath sounds: Normal breath sounds.   Abdominal:      General: Bowel sounds are normal.      Palpations: Abdomen is soft.      Tenderness: There is abdominal tenderness in the right upper quadrant and epigastric area.   Musculoskeletal:         General: Normal range of motion.      Cervical back: Normal range of motion and neck supple.   Skin:     General: Skin is warm and dry.   Neurological:      Mental Status: She is alert and oriented to person, place, and time.      Deep Tendon Reflexes: Reflexes are normal and symmetric.   Psychiatric:         Behavior: Behavior normal.         Thought Content: Thought content normal.         Judgment: Judgment normal.         ED Course        Labs Reviewed   COMP METABOLIC PANEL (14) - Abnormal; Notable for the following components:       Result Value    Glucose 264 (*)     ALT 70 (*)     AST 59 (*)     All other components within normal limits   CBC WITH DIFFERENTIAL WITH PLATELET - Abnormal; Notable for the following components:    WBC 11.7 (*)     RBC 5.32 (*)     Monocyte Absolute 1.25 (*)     All other components within normal limits   LIPASE - Abnormal; Notable for the following components:    Lipase 60 (*)     All other components within normal limits   POCT GLUCOSE - Abnormal; Notable for the following components:    POC Glucose  271 (*)     All other components within normal limits   TROPONIN I HIGH SENSITIVITY - Normal   ACETONE   TRIGLYCERIDES   RAINBOW DRAW BLUE     EKG    Rate,  intervals and axes as noted on EKG Report.  Rate: 63  Rhythm: Sinus Rhythm  Reading: No ST deviation, normal axis, unchanged from previous EKG on 4/24/2025             Imaging Results Available and Reviewed while in ED: CT ABDOMEN+PELVIS(CONTRAST ONLY)(CPT=74177)  Result Date: 5/21/2025  CONCLUSION:   Faint fat stranding adjacent to the distal pancreatic body/pancreatic tail which could reflect mild acute pancreatitis.  Advise lipase correlation.  Cholecystectomy.  1.6 cm right adnexal cyst.  Follow-up nonemergent pelvic ultrasound recommended.  Lesser incidental findings as above.      Dictated by (CST): Cristi Baez MD on 5/21/2025 at 12:11 PM     Finalized by (CST): Cristi Baez MD on 5/21/2025 at 12:16 PM          ED Medications Administered:   Medications   morphINE PF 4 MG/ML injection 4 mg (has no administration in time range)   ketorolac (Toradol) 30 MG/ML injection 30 mg (has no administration in time range)   sodium chloride 0.9 % IV bolus 1,000 mL (0 mL Intravenous Stopped 5/21/25 1227)   famotidine (Pepcid) 20 mg/2mL injection 20 mg (20 mg Intravenous Given 5/21/25 1050)   mylanta-dicyclomine-lidocaine 2% (G.I. Cocktail) oral liquid ( Oral Given 5/21/25 1050)   morphINE PF 4 MG/ML injection 4 mg (4 mg Intravenous Given 5/21/25 1129)   iopamidol 76% (ISOVUE-370) injection for power injector (80 mL Intravenous Given 5/21/25 1159)         MDM     Vitals:    05/21/25 0938 05/21/25 1100 05/21/25 1200 05/21/25 1230   BP: 119/74 129/71 134/70 126/71   Pulse: 66 57 65 52   Resp: 20 24 16 14   Temp: 97.6 °F (36.4 °C)      TempSrc: Temporal      SpO2: 97% 98% 96% 98%   Weight: 83.9 kg      Height: 165.1 cm (5' 5\")        *I personally reviewed and interpreted all ED vitals.  I also personally reviewed all labs and imaging if ordered    Pulse Ox: 98%, Room air, Normal     Monitor Interpretation:   normal sinus rhythm    Differential Diagnosis/ Diagnostic Considerations: Gastritis, pancreatitis,  colitis    Medical Record Review: I personally reviewed available prior medical records for any recent pertinent discharge summaries, testing, and procedures and reviewed those reports.    Complicating Factors: The patient already has does not have any pertinent problems on file. to contribute to the complexity of this ED evaluation.    Medical Decision Making  54-year-old female presents ER with complaint of epigastric pain.  Patient with nausea as well as 10 out of 10 pain in the ER.  Patient with slightly elevated lipase and CT abdomen pelvis shows mild pancreatitis.  Discussed with patient's who states that she is too uncomfortable to be discharged home.  Patient given IV fluids as well as morphine and Toradol.  [facility] hospitalist notified of the admission.    Total Critical Care Time: 32 minutes including time spent examining and re-evaluating the patient, ordering and reviewing laboratory tests, documenting, reviewing previous records, obtaining information from the family, and speaking with consultants, admitting doctors, nurses and medics and excludes any time spent on procedures.      Problems Addressed:  Acute pancreatitis, unspecified complication status, unspecified pancreatitis type (HCC): acute illness or injury    Amount and/or Complexity of Data Reviewed  Labs: ordered. Decision-making details documented in ED Course.  Radiology: ordered and independent interpretation performed. Decision-making details documented in ED Course.     Details: The abdomen pelvis reviewed by myself shows no bowel obstruction or constipation.        Condition upon leaving the department: Stable    Disposition and Plan     Clinical Impression:  1. Acute pancreatitis, unspecified complication status, unspecified pancreatitis type (HCC)        Disposition:  Admit    Hospital Problems       Present on Admission  Date Reviewed: 5/19/2025          ICD-10-CM Noted POA    * (Principal) Acute pancreatitis, unspecified complication  status, unspecified pancreatitis type (HCC) K85.90 5/21/2025 Unknown          Signed by Quang Bryant DO on 5/21/2025  1:08 PM             History and Physical    H&P signed by Eboni Capone MD at 5/21/2025  1:39 PM    [facility]  part of [facility]     History & Physical  Date:  5/21/2025  Date of Admission:  5/21/2025     History provided by:Patient and medical records   Chief Complaint:          Chief Complaint   Patient presents with    Chest Pain    Hyperglycemia         HPI:   Lissette Block is a(n) 54 year old female who has a pmh of DM2 who was admitted for severe epigastric pain for the past 3 days. She also reports some chest pain.Blood sugars have also been uncontrolled. She denies fever/chills, sob and nausea/vomiting. CT scan of the abdomen and pelvis revealed no obstruction but mild pancreatitis. IV fluids, IV morphine have been given with minimal relief.  BP in the /70, HR 65, 97.6. Patient drank coffee and eat cookies this morning but the pain persisted. She will be admitted to the medical floor for further treatment and workup.      CT ABDOMEN+PELVIS(CONTRAST ONLY)(CPT=74177)  Result Date: 5/21/2025  CONCLUSION:   Faint fat stranding adjacent to the distal pancreatic body/pancreatic tail which could reflect mild acute pancreatitis.  Advise lipase correlation.  Cholecystectomy.  1.6 cm right adnexal cyst.  Follow-up nonemergent pelvic ultrasound recommended.  Lesser incidental findings as above.      Dictated by (CST): Cristi Baez MD on 5/21/2025 at 12:11 PM     Finalized by (CST): Cristi Baez MD on 5/21/2025 at 12:16 PM           EKG 12 Lead  Result Date: 5/21/2025  Normal sinus rhythm Normal ECG When compared with ECG of 24-APR-2025 12:56, No significant change was found        Assessment/Plan:      Acute epigastric pain  - secondary to acute pancreatitis  - IV fluids  - Pain control with IV morphine  - CLD  - bowel rest   - Repeat labs and  lipase in AM     DM2  - SSI low dose  - accuechecks     DVT proph- heparin     Full     MDM. High        RORY BROWN MD  5/21/2025            Signed by Rory Brown MD on 5/21/2025  1:39 PM                     MEDICATIONS ADMINISTERED IN LAST 1 DAY:  acidophilus (Probiotic) cap/tab 1 capsule       Date Action Dose Route User    5/22/2025 0814 Given 1 capsule Oral Aline Bains RN    5/21/2025 2106 Given 1 capsule Oral Valencia Martinez RN          aspirin DR tab 81 mg       Date Action Dose Route User    5/22/2025 0814 Given 81 mg Oral Aline Bains RN    5/21/2025 2106 Given 81 mg Oral Valencia Martinez RN          gabapentin (Neurontin) cap 100 mg       Date Action Dose Route User    5/21/2025 2106 Given 100 mg Oral Valencia Martinez RN          heparin (Porcine) 5000 UNIT/ML injection 5,000 Units       Date Action Dose Route User    5/22/2025 0612 Given 5,000 Units Subcutaneous (Left Lower Abdomen) Cecy Avilez RN    5/21/2025 2106 Given 5,000 Units Subcutaneous (Left Lower Abdomen) Valencia Martinez RN          ketorolac (Toradol) 30 MG/ML injection 30 mg       Date Action Dose Route User    5/21/2025 1317 Given 30 mg Intravenous Evangelina Lozano RN          morphINE PF 4 MG/ML injection 4 mg       Date Action Dose Route User    5/22/2025 1120 Given 4 mg Intravenous Aline Bains RN    5/22/2025 0814 Given 4 mg Intravenous Aline Bains RN    5/22/2025 0612 Given 4 mg Intravenous Cecy Avilez RN    5/22/2025 0347 Given 4 mg Intravenous Cecy Avilez RN    5/21/2025 2319 Given 4 mg Intravenous Cecy Avilez RN    5/21/2025 2106 Given 4 mg Intravenous Valencia Martinez RN          morphINE PF 4 MG/ML injection 4 mg       Date Action Dose Route User    5/21/2025 1317 Given 4 mg Intravenous Evangelina Lozano RN          morphINE PF 4 MG/ML injection 4 mg       Date Action Dose Route User    5/21/2025 1715 Given 4 mg Intravenous Mayr Block RN          ondansetron (Zofran) 4 MG/2ML  injection 4 mg       Date Action Dose Route User    5/22/2025 0920 Given 4 mg Intravenous Aline Bains RN          pantoprazole (Protonix) DR tab 40 mg       Date Action Dose Route User    5/22/2025 0612 Given 40 mg Oral Cecy Avilez RN          piperacillin-tazobactam (Zosyn) 3.375 g in dextrose 5% 100 mL IVPB-ADDV       Date Action Dose Route User    5/22/2025 0940 New Bag 3.375 g Intravenous Aline Bains RN          sodium chloride 0.9% infusion       Date Action Dose Route User    5/22/2025 0348 New Bag (none) Intravenous Cecy Avilez RN    5/21/2025 1821 New Bag (none) Intravenous Phyllis Holbrook RN            Vitals (last day)       Date/Time Temp Pulse Resp BP SpO2 Weight O2 Device O2 Flow Rate (L/min) [facility]    05/22/25 0813 98.1 °F (36.7 °C) 59 22 124/78 94 % -- None (Room air) -- CG    05/22/25 0346 97.9 °F (36.6 °C) 55 20 115/67 97 % -- None (Room air) -- ES    05/21/25 2106 98.2 °F (36.8 °C) 58 22 121/82 99 % -- None (Room air) -- TK    05/21/25 1821 -- -- -- -- -- 197 lb 8 oz (89.6 kg) -- -- CT    05/21/25 1820 98 °F (36.7 °C) 54 -- 132/80 93 % -- None (Room air) -- CT    05/21/25 1700 -- 58 16 101/50 96 % -- None (Room air) -- JG    05/21/25 1448 -- 52 13 110/63 93 % -- -- -- BW    05/21/25 1430 -- 50 19 99/54 94 % -- -- -- BW    05/21/25 1230 -- 52 14 126/71 98 % -- -- -- BW    05/21/25 1200 -- 65 16 134/70 96 % -- -- -- BW    05/21/25 1100 -- 57 24 129/71 98 % -- -- -- BW    05/21/25 0938 97.6 °F (36.4 °C) 66 20 119/74 97 % 185 lb (83.9 kg) -- -- TP

## 2025-05-22 NOTE — PAYOR COMM NOTE
--------------  CONTINUED STAY REVIEW  5/22  Payor: [employer]  Subscriber #:  [id number]  Authorization Number: [id number]    Admit date: 5/21/25  Admit time:  6:20 PM    REVIEW DOCUMENTATION:  Hospitalist      Lissette Block is a(n) 54 year old female who is feeling ok. Still in a lot of pain. No fevers. Cannot tolerate a diet.      Objective:   Blood pressure 124/78, pulse 59, temperature 98.1 °F (36.7 °C), temperature source Oral, resp. rate 22, height 5' 5\" (1.651 m), weight 197 lb 8 oz (89.6 kg), SpO2 94%.     Physical Exam:    General: No acute distress.   Respiratory: Clear to auscultation bilaterally. No wheezes. No rhonchi.  Cardiovascular: S1, S2. Regular rate and rhythm. No murmurs, rubs or gallops.   Abdomen: Soft, nontender, nondistended.  Positive bowel sounds. No rebound or guarding.  Neurologic: No focal neurological deficits.   Musculoskeletal: Moves all extremities.  Extremities: No edema.     Results:            Lab Results   Component Value Date     WBC 10.5 05/22/2025     HGB 13.4 05/22/2025     HCT 42.1 05/22/2025     .0 05/22/2025     CREATSERUM 0.76 05/22/2025     BUN 12 05/22/2025      05/22/2025     K 4.1 05/22/2025      05/22/2025     CO2 24.0 05/22/2025      (H) 05/22/2025     CA 8.3 (L) 05/22/2025     ALB 3.8 05/22/2025     ALKPHO 48 05/22/2025     BILT 0.4 05/22/2025     TP 6.4 05/22/2025     AST 47 (H) 05/22/2025     ALT 65 (H) 05/22/2025     TSH 3.010 11/19/2024     LIP 54 (H) 05/22/2025     Acute epigastric pain  - secondary to acute pancreatitis  - IV fluids  - Pain control with IV morphine  - NPO  - gI consult may need MRCP   - bowel rest   - Repeat labs and lipase in AM     DM2  - SSI low dose  - accuechecks     DVT proph- heparin     Full     MDM. High            Gastroenterology     Reason for Consultation:  Acute pancreatitis      History of Present Illness:  Lissette Block is a 54 year old female w/ PMHx of diabetes who presented to ER  for 2 days of worsening epigastric pain. She notes pain has been constant a sharp. Patient tried to eat at home and had worsening pain. She had mild nausea, no vomiting. Notes prior to two days ago, was tolerating diet well. No decreased appetite or weight loss.   Denies acid reflux, dysphagia and globus. Denies bloating and changes in bowel habits.   Denies fevers and chills at home.        Is this a shared or split note between Advanced Practice Provider and Physician? Yes        [facility]   Gastroenterology Consultation Note     Lissette Block  Patient Status:                  Inpatient  Date of Admission:         5/21/2025, Hospital day #1  Attending:                          Eboni Capone MD  PCP:                                  PHYSICIAN NONSTAFF     Reason for Consultation:  Acute pancreatitis      History of Present Illness:  Lissette Block is a 54 year old female w/ PMHx of diabetes who presented to ER for 2 days of worsening epigastric pain. She notes pain has been constant a sharp. Patient tried to eat at home and had worsening pain. She had mild nausea, no vomiting. Notes prior to two days ago, was tolerating diet well. No decreased appetite or weight loss.   Denies acid reflux, dysphagia and globus. Denies bloating and changes in bowel habits.   Denies fevers and chills at home.      No ETOH or tobacco use. No family hx of pancreatic cancer.      Pertinent Family Hx:  - No known history of esophageal, gastric or colon cancers  - No known IBD  - No known liver pathologies     Endoscopy Hx:  - none     Social Hx:  - No tobacco use/No ETOH  - Denies illicit drug use  - Lives with: family   - Occupation: Works at [employer]        History:  [Past Medical History]    [Past Medical History]   Depression    Diabetes (HCC)    Esophageal reflux    High blood pressure    High cholesterol    Migraines    PONV (postoperative nausea and vomiting)    Sleep apnea     NO MACHINE/TREATMENT     Stroke (HCC)     TIA    Visual impairment     CONTACTS/GLASSES     [Past Surgical History]    [Past Surgical History]        Procedure Laterality Date    Appendectomy   02/22/2024    Cholecystectomy   01/01/1991    Hysterectomy   01/01/2013     partial hysterectomy, has ovaries    Removal of ovary(s) Right 02/22/2024     RT ovary    Tubal ligation   18 years ago     [Family History]    [Family History]        Problem Relation Age of Onset    Hypertension Mother      Diabetes Father      Prostate Cancer Father 65    Cancer Paternal Grandmother      Breast Cancer Neg      Ovarian Cancer Neg      Pancreatic Cancer Neg        reports that she has quit smoking. Her smoking use included cigarettes. She quit smokeless tobacco use about 11 years ago. She reports that she does not currently use alcohol. She reports that she does not use drugs.     Allergies:  [Allergies]    [Allergies]       Allergen Reactions    Lexapro [Escitalopram] SWELLING and Tightness in Throat        Medications:  [Current Hospital Medications]    [Current Hospital Medications]     Current Facility-Administered Medications:     piperacillin-tazobactam (Zosyn) 3.375 g in dextrose 5% 100 mL IVPB-ADDV, 3.375 g, Intravenous, Q8H    HYDROcodone-acetaminophen (Norco) 5-325 MG per tab 1 tablet, 1 tablet, Oral, Q6H PRN    dextrose 5%-sodium chloride 0.9% infusion, , Intravenous, Continuous    heparin (Porcine) 5000 UNIT/ML injection 5,000 Units, 5,000 Units, Subcutaneous, Q8H MARSHAL    ondansetron (Zofran) 4 MG/2ML injection 4 mg, 4 mg, Intravenous, Q6H PRN    prochlorperazine (Compazine) 10 MG/2ML injection 5 mg, 5 mg, Intravenous, Q8H PRN    morphINE PF 2 MG/ML injection 1 mg, 1 mg, Intravenous, Q2H PRN **OR** morphINE PF 2 MG/ML injection 2 mg, 2 mg, Intravenous, Q2H PRN **OR** morphINE PF 4 MG/ML injection 4 mg, 4 mg, Intravenous, Q2H PRN    glucose (Dex4) 15 GM/59ML oral liquid 15 g, 15 g, Oral, Q15 Min PRN **OR** glucose (Glutose) 40% oral gel 15 g, 15  g, Oral, Q15 Min PRN **OR** glucose-vitamin C (Dex-4) chewable tab 4 tablet, 4 tablet, Oral, Q15 Min PRN **OR** dextrose 50% injection 50 mL, 50 mL, Intravenous, Q15 Min PRN **OR** glucose (Dex4) 15 GM/59ML oral liquid 30 g, 30 g, Oral, Q15 Min PRN **OR** glucose (Glutose) 40% oral gel 30 g, 30 g, Oral, Q15 Min PRN **OR** glucose-vitamin C (Dex-4) chewable tab 8 tablet, 8 tablet, Oral, Q15 Min PRN    insulin aspart (NovoLOG) 100 Units/mL FlexPen 1-11 Units, 1-11 Units, Subcutaneous, TID CC    glucose (Dex4) 15 GM/59ML oral liquid 15 g, 15 g, Oral, Q15 Min PRN **OR** glucose (Glutose) 40% oral gel 15 g, 15 g, Oral, Q15 Min PRN **OR** dextrose 50% injection 50 mL, 50 mL, Intravenous, Q15 Min PRN **OR** glucose (Dex4) 15 GM/59ML oral liquid 30 g, 30 g, Oral, Q15 Min PRN **OR** glucose (Glutose) 40% oral gel 30 g, 30 g, Oral, Q15 Min PRN    acidophilus (Probiotic) cap/tab 1 capsule, 1 capsule, Oral, Daily    aspirin DR tab 81 mg, 81 mg, Oral, Daily    gabapentin (Neurontin) cap 100 mg, 100 mg, Oral, Nightly    pantoprazole (Protonix) DR tab 40 mg, 40 mg, Oral, QAM AC     Review of Systems:   CONSTITUTIONAL:  negative for fevers, chills, unintentional weight loss   EYES:  negative for diplopia or change in vision   RESPIRATORY:  negative for severe shortness of breath  CARDIOVASCULAR:  negative for crushing sub-sternal chest pain  GASTROINTESTINAL:  see HPI  GENITOURINARY:  negative for dysuria or gross hematuria  INTEGUMENT/BREAST:  SKIN:  negative for jaundice or new rash   ALLERGIC/IMMUNOLOGIC:  negative for hay fever   ENDOCRINE:  negative for cold intolerance and heat intolerance  MUSCULOSKELETAL:  negative for joint effusion/severe erythema  NEURO: negative for new loss of consciousness or dizziness   BEHAVIOR/PSYCH:  negative for psychotic behavior     Physical Exam:    Blood pressure 124/78, pulse 59, temperature 98.1 °F (36.7 °C), temperature source Oral, resp. rate 22, height 5' 5\" (1.651 m), weight 197 lb 8 oz  (89.6 kg), SpO2 94%. Body mass index is 32.87 kg/m².     Gen: awake, alert patient, NAD  HEENT: EOMI, the sclera appears anicteric, oropharynx clear, mucus membranes appear moist  CV: RRR  Lung: no conversational dyspnea   Abdomen: TTP epigastric brooks Block is a 54 year old female w/ PMHx of diabetes who presented to ER for 2 days of worsening epigastric pain. She notes pain has been constant a sharp. Patient tried to eat at home and had worsening pain. She had mild nausea, no vomiting. Notes prior to two days ago, was tolerating diet well. No decreased appetite or weight loss.      #acute pancreatitis   - ? Passed sludge or stone  - triglycerides 300, no ETOH or tobacco use  - 2 days of acute epigastric pain with nausea  - plan for MRCP to further evaluate for passed stone sludge, pancreatic abnormality, IGG4 ordered as well   - supportive care with IVF, pain and anti emetic control per primary      Recommend:  -IVF   -pain control per primary   -MRCP  -NPO   -antiemetics per primary      MEDICATIONS ADMINISTERED IN LAST 1 DAY:  acidophilus (Probiotic) cap/tab 1 capsule       Date Action Dose Route User    5/22/2025 0814 Given 1 capsule Oral Aline Bains RN    5/21/2025 2106 Given 1 capsule Oral Valencia Martinez RN          aspirin DR tab 81 mg       Date Action Dose Route User    5/22/2025 0814 Given 81 mg Oral Aline Bains RN    5/21/2025 2106 Given 81 mg Oral Valencia Martinez RN          dextrose 5%-sodium chloride 0.9% infusion       Date Action Dose Route User    5/22/2025 1256 New Bag (none) Intravenous Aline Bains RN          gabapentin (Neurontin) cap 100 mg       Date Action Dose Route User    5/21/2025 2106 Given 100 mg Oral Valencia Martinez RN          heparin (Porcine) 5000 UNIT/ML injection 5,000 Units       Date Action Dose Route User    5/22/2025 1316 Given 5,000 Units Subcutaneous (Left Lower Abdomen) Aline Bains RN    5/22/2025 0612 Given 5,000 Units  Subcutaneous (Left Lower Abdomen) Cecy Avilez RN    5/21/2025 2106 Given 5,000 Units Subcutaneous (Left Lower Abdomen) Valencia Martinez RN          HYDROcodone-acetaminophen (Norco) 5-325 MG per tab 1 tablet       Date Action Dose Route User    5/22/2025 1316 Given 1 tablet Oral Aline Bains RN          morphINE PF 2 MG/ML injection 2 mg       Date Action Dose Route User    5/22/2025 1409 Given 2 mg Intravenous Aline Bains RN          morphINE PF 4 MG/ML injection 4 mg       Date Action Dose Route User    5/22/2025 1120 Given 4 mg Intravenous Aline Bains RN    5/22/2025 0814 Given 4 mg Intravenous Aline Bains RN    5/22/2025 0612 Given 4 mg Intravenous Cecy Avilez RN    5/22/2025 0347 Given 4 mg Intravenous Cecy Avilez RN    5/21/2025 2319 Given 4 mg Intravenous Cecy Avilez RN    5/21/2025 2106 Given 4 mg Intravenous Valencia Martinez RN          morphINE PF 4 MG/ML injection 4 mg       Date Action Dose Route User    5/21/2025 1715 Given 4 mg Intravenous Mary Block RN          ondansetron (Zofran) 4 MG/2ML injection 4 mg       Date Action Dose Route User    5/22/2025 0920 Given 4 mg Intravenous Aline Bains RN          pantoprazole (Protonix) DR tab 40 mg       Date Action Dose Route User    5/22/2025 0612 Given 40 mg Oral Cecy Avilez RN          piperacillin-tazobactam (Zosyn) 3.375 g in dextrose 5% 100 mL IVPB-ADDV       Date Action Dose Route User    5/22/2025 0940 New Bag 3.375 g Intravenous Aline Bains RN          sodium chloride 0.9% infusion       Date Action Dose Route User    5/22/2025 0348 New Bag (none) Intravenous Cecy Avilez RN    5/21/2025 1821 New Bag (none) Intravenous Phyllis Holbrook RN            Vitals (last day)       Date/Time Temp Pulse Resp BP SpO2 Weight O2 Device O2 Flow Rate (L/min) [facility]    05/22/25 0813 98.1 °F (36.7 °C) 59 22 124/78 94 % -- None (Room air) --     05/22/25 0346 97.9 °F (36.6 °C) 55 20 115/67 97 % -- None  (Room air) -- ES    05/21/25 2106 98.2 °F (36.8 °C) 58 22 121/82 99 % -- None (Room air) -- TK    05/21/25 1821 -- -- -- -- -- 197 lb 8 oz (89.6 kg) -- -- CT    05/21/25 1820 98 °F (36.7 °C) 54 -- 132/80 93 % -- None (Room air) -- CT    05/21/25 1700 -- 58 16 101/50 96 % -- None (Room air) -- JG    05/21/25 1448 -- 52 13 110/63 93 % -- -- --     05/21/25 1430 -- 50 19 99/54 94 % -- -- --     05/21/25 1230 -- 52 14 126/71 98 % -- -- --     05/21/25 1200 -- 65 16 134/70 96 % -- -- --     05/21/25 1100 -- 57 24 129/71 98 % -- -- --     05/21/25 0938 97.6 °F (36.4 °C) 66 20 119/74 97 % 185 lb (83.9 kg) -- -- TP          CIWA Scores (since admission)       None

## 2025-05-23 ENCOUNTER — APPOINTMENT (OUTPATIENT)
Dept: MRI IMAGING | Facility: HOSPITAL | Age: 55
End: 2025-05-23
Attending: HOSPITALIST
Payer: MEDICAID

## 2025-05-23 LAB
ALBUMIN SERPL-MCNC: 3.8 G/DL (ref 3.2–4.8)
ALP LIVER SERPL-CCNC: 45 U/L (ref 41–108)
ALT SERPL-CCNC: 61 U/L (ref 10–49)
ANION GAP SERPL CALC-SCNC: 7 MMOL/L (ref 0–18)
AST SERPL-CCNC: 36 U/L (ref ?–34)
BASOPHILS # BLD AUTO: 0.06 X10(3) UL (ref 0–0.2)
BASOPHILS NFR BLD AUTO: 0.7 %
BILIRUB DIRECT SERPL-MCNC: 0.2 MG/DL (ref ?–0.3)
BILIRUB SERPL-MCNC: 0.5 MG/DL (ref 0.3–1.2)
BUN BLD-MCNC: 10 MG/DL (ref 9–23)
BUN/CREAT SERPL: 11.9 (ref 10–20)
CALCIUM BLD-MCNC: 8.4 MG/DL (ref 8.7–10.4)
CHLORIDE SERPL-SCNC: 106 MMOL/L (ref 98–112)
CO2 SERPL-SCNC: 25 MMOL/L (ref 21–32)
CREAT BLD-MCNC: 0.84 MG/DL (ref 0.55–1.02)
DEPRECATED RDW RBC AUTO: 40.4 FL (ref 35.1–46.3)
EGFRCR SERPLBLD CKD-EPI 2021: 83 ML/MIN/1.73M2 (ref 60–?)
EOSINOPHIL # BLD AUTO: 0.21 X10(3) UL (ref 0–0.7)
EOSINOPHIL NFR BLD AUTO: 2.4 %
ERYTHROCYTE [DISTWIDTH] IN BLOOD BY AUTOMATED COUNT: 12.5 % (ref 11–15)
GLUCOSE BLD-MCNC: 142 MG/DL (ref 70–99)
GLUCOSE BLDC GLUCOMTR-MCNC: 110 MG/DL (ref 70–99)
GLUCOSE BLDC GLUCOMTR-MCNC: 119 MG/DL (ref 70–99)
GLUCOSE BLDC GLUCOMTR-MCNC: 134 MG/DL (ref 70–99)
GLUCOSE BLDC GLUCOMTR-MCNC: 146 MG/DL (ref 70–99)
HCT VFR BLD AUTO: 41.6 % (ref 35–48)
HGB BLD-MCNC: 13.2 G/DL (ref 12–16)
IMM GRANULOCYTES # BLD AUTO: 0.06 X10(3) UL (ref 0–1)
IMM GRANULOCYTES NFR BLD: 0.7 %
LYMPHOCYTES # BLD AUTO: 3.19 X10(3) UL (ref 1–4)
LYMPHOCYTES NFR BLD AUTO: 36.5 %
MCH RBC QN AUTO: 28.1 PG (ref 26–34)
MCHC RBC AUTO-ENTMCNC: 31.7 G/DL (ref 31–37)
MCV RBC AUTO: 88.7 FL (ref 80–100)
MONOCYTES # BLD AUTO: 1.13 X10(3) UL (ref 0.1–1)
MONOCYTES NFR BLD AUTO: 12.9 %
NEUTROPHILS # BLD AUTO: 4.08 X10 (3) UL (ref 1.5–7.7)
NEUTROPHILS # BLD AUTO: 4.08 X10(3) UL (ref 1.5–7.7)
NEUTROPHILS NFR BLD AUTO: 46.8 %
OSMOLALITY SERPL CALC.SUM OF ELEC: 287 MOSM/KG (ref 275–295)
PLATELET # BLD AUTO: 242 10(3)UL (ref 150–450)
POTASSIUM SERPL-SCNC: 3.9 MMOL/L (ref 3.5–5.1)
PROT SERPL-MCNC: 6.5 G/DL (ref 5.7–8.2)
RBC # BLD AUTO: 4.69 X10(6)UL (ref 3.8–5.3)
SODIUM SERPL-SCNC: 138 MMOL/L (ref 136–145)
WBC # BLD AUTO: 8.7 X10(3) UL (ref 4–11)

## 2025-05-23 PROCEDURE — 74183 MRI ABD W/O CNTR FLWD CNTR: CPT | Performed by: HOSPITALIST

## 2025-05-23 PROCEDURE — 99232 SBSQ HOSP IP/OBS MODERATE 35: CPT | Performed by: PHYSICIAN ASSISTANT

## 2025-05-23 PROCEDURE — 76376 3D RENDER W/INTRP POSTPROCES: CPT | Performed by: HOSPITALIST

## 2025-05-23 PROCEDURE — 99233 SBSQ HOSP IP/OBS HIGH 50: CPT | Performed by: HOSPITALIST

## 2025-05-23 RX ORDER — HYDROMORPHONE HYDROCHLORIDE 1 MG/ML
0.5 INJECTION, SOLUTION INTRAMUSCULAR; INTRAVENOUS; SUBCUTANEOUS EVERY 2 HOUR PRN
Refills: 0 | Status: DISCONTINUED | OUTPATIENT
Start: 2025-05-23 | End: 2025-05-26

## 2025-05-23 RX ORDER — HYDROMORPHONE HYDROCHLORIDE 1 MG/ML
1 INJECTION, SOLUTION INTRAMUSCULAR; INTRAVENOUS; SUBCUTANEOUS EVERY 2 HOUR PRN
Refills: 0 | Status: DISCONTINUED | OUTPATIENT
Start: 2025-05-23 | End: 2025-05-26

## 2025-05-23 RX ORDER — LORAZEPAM 2 MG/ML
1 INJECTION INTRAMUSCULAR ONCE
Status: COMPLETED | OUTPATIENT
Start: 2025-05-23 | End: 2025-05-23

## 2025-05-23 RX ORDER — GADOTERATE MEGLUMINE 376.9 MG/ML
15 INJECTION INTRAVENOUS
Status: COMPLETED | OUTPATIENT
Start: 2025-05-23 | End: 2025-05-23

## 2025-05-23 NOTE — PROGRESS NOTES
[facility]     Gastroenterology Progress Note    Lissette Block Patient Status:  Inpatient    1970 MRN [id number]   Location [facility]5W Attending Eboni Capone MD   Hosp Day # 2 PCP PHYSICIAN NONSTAFF       Subjective:   Continues to have epigastric pain   Awaiting MRI  No nausea or vomiting today     Denies CP, SOB, dizziness and light headedness  Objective:   Blood pressure 143/75, pulse 58, temperature (!) 93.3 °F (34.1 °C), temperature source Axillary, resp. rate 18, height 5' 5\" (1.651 m), weight 197 lb 8 oz (89.6 kg), SpO2 100%. Body mass index is 32.87 kg/m².    Gen: awake, alert patient, NAD  HEENT: EOMI, the sclera appears anicteric, oropharynx clear, mucus membranes appear moist  CV: RRR  Lung: no conversational dyspnea   Abdomen:TTP epigastric region. soft ND abdomen with NABS appreciated   Skin: dry, warm, no jaundice  Ext: no LE edema is evident  Neuro: Alert and interactive  Psych: calm, cooperative    Assessment and Plan:   Lissette Blokc is a 54 year old female w/ PMHx of diabetes who presented to ER for 2 days of worsening epigastric pain. She notes pain has been constant a sharp. Patient tried to eat at home and had worsening pain. She had mild nausea, no vomiting. Notes prior to two days ago, was tolerating diet well. No decreased appetite or weight loss.      #acute pancreatitis   - ? Passed sludge or stone  - triglycerides 300, no ETOH or tobacco use  - 2 days of acute epigastric pain with nausea  - plan for MRCP to further evaluate for passed stone sludge, pancreatic abnormality, IGG4 ordered as well   - supportive care with IVF, pain and anti emetic control per primary      Recommend:  -IVF   -pain control per primary   -MRCP  -NPO   -antiemetics per primary     Case discussed with Telly Way MD.     Viola Rader PA-C  [facility] Gastroenterology  2025      Results:     Lab Results   Component Value Date    WBC  8.7 05/23/2025    HGB 13.2 05/23/2025    HCT 41.6 05/23/2025    .0 05/23/2025    CREATSERUM 0.84 05/23/2025    BUN 10 05/23/2025     05/23/2025    K 3.9 05/23/2025     05/23/2025    CO2 25.0 05/23/2025     (H) 05/23/2025    CA 8.4 (L) 05/23/2025    ALB 3.8 05/23/2025    ALKPHO 45 05/23/2025    BILT 0.5 05/23/2025    TP 6.5 05/23/2025    AST 36 (H) 05/23/2025    ALT 61 (H) 05/23/2025    TSH 3.010 11/19/2024    LIP 54 (H) 05/22/2025    DDIMER 0.61 (H) 06/10/2024    TROP 0.00 01/26/2018    B12 816 04/08/2024       No results found.

## 2025-05-23 NOTE — PROGRESS NOTES
[facility]  part of [facility]    Progress Note    Lissette Block Patient Status:  Inpatient    1970 MRN [id number]   Location [facility]5W Attending Eboni Capone MD   Hosp Day # 2 PCP PHYSICIAN NONSTAFF       Subjective:   Lissette Block is still having some pain in her right upper quadrant. No fever. Received morphine with minimal relief.     Objective:   Blood pressure 143/75, pulse 58, temperature (!) 93.3 °F (34.1 °C), temperature source Axillary, resp. rate 18, height 5' 5\" (1.651 m), weight 197 lb 8 oz (89.6 kg), SpO2 100%.    Physical Exam:    General: No acute distress.   Respiratory: Clear to auscultation bilaterally. No wheezes. No rhonchi.  Cardiovascular: S1, S2. Regular rate and rhythm. No murmurs, rubs or gallops.   Abdomen: Soft, nontender, nondistended.  Positive bowel sounds. No rebound or guarding.  Neurologic: No focal neurological deficits.   Musculoskeletal: Moves all extremities.  Extremities: No edema.    Results:     Lab Results   Component Value Date    WBC 8.7 2025    HGB 13.2 2025    HCT 41.6 2025    .0 2025    CREATSERUM 0.84 2025    BUN 10 2025     2025    K 3.9 2025     2025    CO2 25.0 2025     (H) 2025    CA 8.4 (L) 2025    ALB 3.8 2025    ALKPHO 45 2025    BILT 0.5 2025    TP 6.5 2025    AST 36 (H) 2025    ALT 61 (H) 2025    TSH 3.010 2024    LIP 54 (H) 2025    DDIMER 0.61 (H) 06/10/2024    TROP 0.00 2018    B12 816 2024       CT ABDOMEN+PELVIS(CONTRAST ONLY)(CPT=74177)  Result Date: 2025  CONCLUSION:   Faint fat stranding adjacent to the distal pancreatic body/pancreatic tail which could reflect mild acute pancreatitis.  Advise lipase correlation.  Cholecystectomy.  1.6 cm right adnexal cyst.  Follow-up nonemergent pelvic ultrasound recommended.  Lesser incidental  findings as above.      Dictated by (CST): Cristi Baez MD on 5/21/2025 at 12:11 PM     Finalized by (CST): Cristi Baez MD on 5/21/2025 at 12:16 PM            Scheduled Medications[1]        Assessment and Plan:       Acute epigastric pain  - secondary to acute pancreatitis  - IV fluids  - Pain control with IV morphine  - NPO  - gI consult   - bowel rest   - Repeat labs and lipase in AM  - continue on IV zosyn   - MRCP ordered stat due to temp  - IV diaudid ordered       DM2  - SSI low dose  - accuechecks     DVT proph- heparin     Full     MDM. High      RORY BROWN MD  05/23/25           [1]    piperacillin-tazobactam  3.375 g Intravenous Q8H    heparin  5,000 Units Subcutaneous Q8H MARSHAL    insulin aspart  1-11 Units Subcutaneous TID CC    acidophilus  1 capsule Oral Daily    aspirin  81 mg Oral Daily    gabapentin  100 mg Oral Nightly    pantoprazole  40 mg Oral QAM AC

## 2025-05-23 NOTE — PLAN OF CARE
Problem: Patient Centered Care  Goal: Patient preferences are identified and integrated in the patient's plan of care  Description: Interventions:  - What would you like us to know as we care for you? From home   - Provide timely, complete, and accurate information to patient/family  - Incorporate patient and family knowledge, values, beliefs, and cultural backgrounds into the planning and delivery of care  - Encourage patient/family to participate in care and decision-making at the level they choose  - Honor patient and family perspectives and choices  Outcome: Progressing     Problem: Patient/Family Goals  Goal: Patient/Family Long Term Goal  Description: Patient's Long Term Goal: discharge     Interventions:  - monitor labs, monitor vitals, abx per orders   - See additional Care Plan goals for specific interventions  Outcome: Progressing  Goal: Patient/Family Short Term Goal  Description: Patient's Short Term Goal: feel better     Interventions:   - Ivf per orders, prn pain medications   - See additional Care Plan goals for specific interventions  Outcome: Progressing     Problem: GASTROINTESTINAL - ADULT  Goal: Minimal or absence of nausea and vomiting  Description: INTERVENTIONS:  - Maintain adequate hydration with IV or PO as ordered and tolerated  - Nasogastric tube to low intermittent suction as ordered  - Evaluate effectiveness of ordered antiemetic medications  - Provide nonpharmacologic comfort measures as appropriate  - Advance diet as tolerated, if ordered  - Obtain nutritional consult as needed  - Evaluate fluid balance  Outcome: Progressing     Problem: SKIN/TISSUE INTEGRITY - ADULT  Goal: Skin integrity remains intact  Description: INTERVENTIONS  - Assess and document risk factors for pressure ulcer development  - Assess and document skin integrity  - Monitor for areas of redness and/or skin breakdown  - Initiate interventions, skin care algorithm/standards of care as needed  Outcome: Progressing      Problem: PAIN - ADULT  Goal: Verbalizes/displays adequate comfort level or patient's stated pain goal  Description: INTERVENTIONS:  - Encourage pt to monitor pain and request assistance  - Assess pain using appropriate pain scale  - Administer analgesics based on type and severity of pain and evaluate response  - Implement non-pharmacological measures as appropriate and evaluate response  - Consider cultural and social influences on pain and pain management  - Manage/alleviate anxiety  - Utilize distraction and/or relaxation techniques  - Monitor for opioid side effects  - Notify MD/LIP if interventions unsuccessful or patient reports new pain  - Anticipate increased pain with activity and pre-medicate as appropriate  Outcome: Progressing    Monitoring vital signs, stable at this time. No acute changes at this moment.  Monitoring blood glucose levels.  Pain medication provided as needed. Fall precautions in place- bed alarm on, bed locked in lowest position, call light within reach. Frequent rounding by nursing staff.

## 2025-05-23 NOTE — PLAN OF CARE
Problem: Patient Centered Care  Goal: Patient preferences are identified and integrated in the patient's plan of care  Description: Interventions:  - What would you like us to know as we care for you? Discharge home   - Provide timely, complete, and accurate information to patient/family  - Incorporate patient and family knowledge, values, beliefs, and cultural backgrounds into the planning and delivery of care  - Encourage patient/family to participate in care and decision-making at the level they choose  - Honor patient and family perspectives and choices  Outcome: Progressing     Problem: Patient/Family Goals  Goal: Patient/Family Long Term Goal  Description: Patient's Long Term Goal: discharge     Interventions:  - monitor labs, monitor vitals, abx per orders   - See additional Care Plan goals for specific interventions  Outcome: Progressing  Goal: Patient/Family Short Term Goal  Description: Patient's Short Term Goal: feel better     Interventions:   - Ivf per orders, prn pain medications   - See additional Care Plan goals for specific interventions  Outcome: Progressing     Problem: GASTROINTESTINAL - ADULT  Goal: Minimal or absence of nausea and vomiting  Description: INTERVENTIONS:  - Maintain adequate hydration with IV or PO as ordered and tolerated  - Nasogastric tube to low intermittent suction as ordered  - Evaluate effectiveness of ordered antiemetic medications  - Provide nonpharmacologic comfort measures as appropriate  - Advance diet as tolerated, if ordered  - Obtain nutritional consult as needed  - Evaluate fluid balance  Outcome: Progressing     Problem: SKIN/TISSUE INTEGRITY - ADULT  Goal: Skin integrity remains intact  Description: INTERVENTIONS  - Assess and document risk factors for pressure ulcer development  - Assess and document skin integrity  - Monitor for areas of redness and/or skin breakdown  - Initiate interventions, skin care algorithm/standards of care as needed  Outcome:  Progressing     Problem: PAIN - ADULT  Goal: Verbalizes/displays adequate comfort level or patient's stated pain goal  Description: INTERVENTIONS:  - Encourage pt to monitor pain and request assistance  - Assess pain using appropriate pain scale  - Administer analgesics based on type and severity of pain and evaluate response  - Implement non-pharmacological measures as appropriate and evaluate response  - Consider cultural and social influences on pain and pain management  - Manage/alleviate anxiety  - Utilize distraction and/or relaxation techniques  - Monitor for opioid side effects  - Notify MD/LIP if interventions unsuccessful or patient reports new pain  - Anticipate increased pain with activity and pre-medicate as appropriate  Outcome: Progressing

## 2025-05-24 LAB
ALBUMIN SERPL-MCNC: 4.2 G/DL (ref 3.2–4.8)
ALBUMIN/GLOB SERPL: 1.4 {RATIO} (ref 1–2)
ALP LIVER SERPL-CCNC: 49 U/L (ref 41–108)
ALT SERPL-CCNC: 54 U/L (ref 10–49)
ANION GAP SERPL CALC-SCNC: 3 MMOL/L (ref 0–18)
AST SERPL-CCNC: 30 U/L (ref ?–34)
BILIRUB SERPL-MCNC: 0.5 MG/DL (ref 0.3–1.2)
BUN BLD-MCNC: 7 MG/DL (ref 9–23)
BUN/CREAT SERPL: 8 (ref 10–20)
CALCIUM BLD-MCNC: 8.8 MG/DL (ref 8.7–10.4)
CHLORIDE SERPL-SCNC: 106 MMOL/L (ref 98–112)
CO2 SERPL-SCNC: 27 MMOL/L (ref 21–32)
CREAT BLD-MCNC: 0.87 MG/DL (ref 0.55–1.02)
DEPRECATED RDW RBC AUTO: 38.2 FL (ref 35.1–46.3)
EGFRCR SERPLBLD CKD-EPI 2021: 80 ML/MIN/1.73M2 (ref 60–?)
ERYTHROCYTE [DISTWIDTH] IN BLOOD BY AUTOMATED COUNT: 12.5 % (ref 11–15)
GLOBULIN PLAS-MCNC: 2.9 G/DL (ref 2–3.5)
GLUCOSE BLD-MCNC: 137 MG/DL (ref 70–99)
GLUCOSE BLDC GLUCOMTR-MCNC: 123 MG/DL (ref 70–99)
GLUCOSE BLDC GLUCOMTR-MCNC: 130 MG/DL (ref 70–99)
GLUCOSE BLDC GLUCOMTR-MCNC: 137 MG/DL (ref 70–99)
GLUCOSE BLDC GLUCOMTR-MCNC: 139 MG/DL (ref 70–99)
HCT VFR BLD AUTO: 42.3 % (ref 35–48)
HGB BLD-MCNC: 13.6 G/DL (ref 12–16)
MCH RBC QN AUTO: 27.3 PG (ref 26–34)
MCHC RBC AUTO-ENTMCNC: 32.2 G/DL (ref 31–37)
MCV RBC AUTO: 84.8 FL (ref 80–100)
OSMOLALITY SERPL CALC.SUM OF ELEC: 282 MOSM/KG (ref 275–295)
PLATELET # BLD AUTO: 266 10(3)UL (ref 150–450)
POTASSIUM SERPL-SCNC: 3.7 MMOL/L (ref 3.5–5.1)
PROT SERPL-MCNC: 7.1 G/DL (ref 5.7–8.2)
RBC # BLD AUTO: 4.99 X10(6)UL (ref 3.8–5.3)
SODIUM SERPL-SCNC: 136 MMOL/L (ref 136–145)
WBC # BLD AUTO: 8.1 X10(3) UL (ref 4–11)

## 2025-05-24 PROCEDURE — 99232 SBSQ HOSP IP/OBS MODERATE 35: CPT | Performed by: INTERNAL MEDICINE

## 2025-05-24 PROCEDURE — 99233 SBSQ HOSP IP/OBS HIGH 50: CPT | Performed by: HOSPITALIST

## 2025-05-24 RX ORDER — POTASSIUM CHLORIDE 14.9 MG/ML
20 INJECTION INTRAVENOUS ONCE
Status: COMPLETED | OUTPATIENT
Start: 2025-05-24 | End: 2025-05-24

## 2025-05-24 RX ORDER — MAGNESIUM HYDROXIDE/ALUMINUM HYDROXICE/SIMETHICONE 120; 1200; 1200 MG/30ML; MG/30ML; MG/30ML
30 SUSPENSION ORAL 4 TIMES DAILY PRN
Status: DISCONTINUED | OUTPATIENT
Start: 2025-05-24 | End: 2025-05-26

## 2025-05-24 NOTE — PROGRESS NOTES
[facility]  GI SERVICE PROGRESS NOTE    Lissette Block Patient Status:  Inpatient    1970 MRN [id number]   Location [facility]5W Attending Eboni Capone MD   Hosp Day # 3 PCP PHYSICIAN NONSTAFF       Subjective:     Ms. Hitesh Block continues to describe severe somewhat disproportionate substernal and epigastric pressure and pain.  She is complaining of nausea.    She denies previous concern for ulcer disease.    Continues to request IV Dilaudid; 6 doses administered past 24 hours    Afebrile; HR 50s-60s; normotensive    Objective:   Blood pressure 133/73, pulse 68, temperature 97.9 °F (36.6 °C), temperature source Oral, resp. rate 17, height 5' 5\" (1.651 m), weight 197 lb 8 oz (89.6 kg), SpO2 97%.    GENERAL: Subdued, lying in bed  HEENT: Normocephalic; pupils equally round and reactive to light; no temporal wasting; no thyromegaly or cervical lymphadenopathy  PULM: Breathing and speaking comfortably  ABD: soft/nondistended  EXT: No edema  SKIN: No rash        Results:       Recent Labs   Lab 25  1046 25  0525  0522 25  0822   RBC 5.32* 4.78 4.69 4.99   HGB 14.8 13.4 13.2 13.6   HCT 44.9 42.1 41.6 42.3   MCV 84.4 88.1 88.7 84.8   MCH 27.8 28.0 28.1 27.3   MCHC 33.0 31.8 31.7 32.2   RDW 13.0 12.9 12.5 12.5   NEPRELIM 5.99 5.33 4.08  --    WBC 11.7* 10.5 8.7 8.1   .0 259.0 242.0 266.0       No results found for: \"PT\", \"INR\"    Recent Labs   Lab 25  0520 25  0522 25  0822   * 142* 137*   BUN 12 10 7*   CREATSERUM 0.76 0.84 0.87   CA 8.3* 8.4* 8.8   ALB 3.8 3.8 4.2    138 136   K 4.1 3.9 3.7    106 106   CO2 24.0 25.0 27.0   ALKPHO 48 45 49   AST 47* 36* 30   ALT 65* 61* 54*   BILT 0.4 0.5 0.5   TP 6.4 6.5 7.1       Recent Labs   Lab 25  1046 25  1118   LIP 60* 54*       No results for input(s): \"ESRML\", \"ESRPF\", \"CRP\", \"MG\", \"PHOS\", \"TROP\", \"B12\" in the last 168 hours.    No results for  input(s): \"URINE\", \"CULTI\", \"BLDSMR\" in the last 168 hours.      MRI ABDOMEN AND MRCP W/3D (W+W0)(CPT=74183/90405)  Result Date: 5/23/2025  CONCLUSION:   1. Question mild edema in the tail of the pancreas, in the right clinical setting may indicate acute interstitial pancreatitis.  No peripancreatic fluid collections are seen.  No pancreatic ductal dilatation.  No suspicious pancreatic lesions.  2. Status post cholecystectomy.  The common bile duct is dilated measuring up to 1.1 cm in diameter with gradual tapering toward the ampulla.  This is favored to relate to post cholecystectomy status and is similar to the prior exam.  No choledocholithiasis.  3. Marked hepatic steatosis and hepatomegaly.  No focal suspicious lesion.  4. Trace bilateral pleural effusions, likely physiologic.    elm-remote     Dictated by (CST): Sukhjinder Vivas MD on 5/23/2025 at 6:35 PM     Finalized by (CST): Sukhjinder Vivas MD on 5/23/2025 at 6:42 PM              Assessment and Plan:     54 year old woman with previous history of Type 2 diabetes who presented to ER for 2 days of worsening epigastric pain. She notes pain has been constant a sharp. Patient tried to eat at home and had worsening pain. She had mild nausea, no vomiting. Notes prior to two days ago, was tolerating diet well. No decreased appetite or weight loss.     Previous history cholecystectomy surgery    Initial evaluation showed lipase of 60, slight spike in LFTs, AST 59 ALT 70.  Initial CT scan shows hepatic steatosis, \"PANCREAS: Faint fat stranding seen adjacent to the distal pancreatic body/pancreatic tail. \"    Question of mild biliary event.    MRI MRCP last night:  Hepatomegaly and hepatic steatosis  Postcholecystectomy moderate diffuse biliary dilation, 11 mm CBD with normal tapering into the ampulla.  No biliary obstruction or choledocholithiasis.  \"PANCREAS: Question mild edema in the body of the pancreas relative to the head and uncinate process \"  No biliary or  pancreatic lesion.    5/24/2025: normal CBC; LFTs improving with AST 30 ALT 54      Recommend:  Currently receiving Zosyn broad-spectrum antibiotic  Vitals stable.  Normal WBC.  LFTs have trended down.  Start clear liquids, advance diet as tolerated  Wean off Dilaudid hydromorphone pain medication    Heparin DVT prophylaxis 5000u q8 hours      Telly Way MD        Over 25 minutes spent today reviewing today's and recent data; greater than 50% of that time was spent counseling the patient and coordination of care with RN and other involved physicians.    Digital transcription software was utilized to produce this note. The note was proofread for content only. Typographical errors may remain.

## 2025-05-24 NOTE — PLAN OF CARE
Problem: Patient Centered Care  Goal: Patient preferences are identified and integrated in the patient's plan of care  Description: Interventions:  - What would you like us to know as we care for you?   - Provide timely, complete, and accurate information to patient/family  - Incorporate patient and family knowledge, values, beliefs, and cultural backgrounds into the planning and delivery of care  - Encourage patient/family to participate in care and decision-making at the level they choose  - Honor patient and family perspectives and choices  Outcome: Progressing     Problem: Patient/Family Goals  Goal: Patient/Family Long Term Goal  Description: Patient's Long Term Goal: discharge     Interventions:  - monitor labs, monitor vitals, abx per orders   - See additional Care Plan goals for specific interventions  Outcome: Progressing  Goal: Patient/Family Short Term Goal  Description: Patient's Short Term Goal: feel better     Interventions:   - Ivf per orders, prn pain medications   - See additional Care Plan goals for specific interventions  Outcome: Progressing     Problem: GASTROINTESTINAL - ADULT  Goal: Minimal or absence of nausea and vomiting  Description: INTERVENTIONS:  - Maintain adequate hydration with IV or PO as ordered and tolerated  - Nasogastric tube to low intermittent suction as ordered  - Evaluate effectiveness of ordered antiemetic medications  - Provide nonpharmacologic comfort measures as appropriate  - Advance diet as tolerated, if ordered  - Obtain nutritional consult as needed  - Evaluate fluid balance  Outcome: Progressing     Problem: SKIN/TISSUE INTEGRITY - ADULT  Goal: Skin integrity remains intact  Description: INTERVENTIONS  - Assess and document risk factors for pressure ulcer development  - Assess and document skin integrity  - Monitor for areas of redness and/or skin breakdown  - Initiate interventions, skin care algorithm/standards of care as needed  Outcome: Progressing      Problem: PAIN - ADULT  Goal: Verbalizes/displays adequate comfort level or patient's stated pain goal  Description: INTERVENTIONS:  - Encourage pt to monitor pain and request assistance  - Assess pain using appropriate pain scale  - Administer analgesics based on type and severity of pain and evaluate response  - Implement non-pharmacological measures as appropriate and evaluate response  - Consider cultural and social influences on pain and pain management  - Manage/alleviate anxiety  - Utilize distraction and/or relaxation techniques  - Monitor for opioid side effects  - Notify MD/LIP if interventions unsuccessful or patient reports new pain  - Anticipate increased pain with activity and pre-medicate as appropriate  Outcome: Progressing

## 2025-05-24 NOTE — PLAN OF CARE
Lissette Garciaarez Umair is A&O x 4.  Lives at home with .    Independent assist with ambulation.  Continent of bowel and bladder.  VS:  stable.  Pain: frequent pain meds needed for epigastric pain; Dr. Capone notified.  Plan:  CLD; AAT, maalox PRN, pain management, ambulate in halls as tolerated, IVF, electrolyte replacement, IV ABX  Bed in lowest position, alarms on, and call light within reach.  Continuation of care in place.    Problem: Patient Centered Care  Goal: Patient preferences are identified and integrated in the patient's plan of care  Description: Interventions:  - What would you like us to know as we care for you?   - Provide timely, complete, and accurate information to patient/family  - Incorporate patient and family knowledge, values, beliefs, and cultural backgrounds into the planning and delivery of care  - Encourage patient/family to participate in care and decision-making at the level they choose  - Honor patient and family perspectives and choices  Outcome: Progressing     Problem: Patient/Family Goals  Goal: Patient/Family Long Term Goal  Description: Patient's Long Term Goal: discharge     Interventions:  - monitor labs, monitor vitals, abx per orders   - See additional Care Plan goals for specific interventions  Outcome: Progressing  Goal: Patient/Family Short Term Goal  Description: Patient's Short Term Goal: feel better     Interventions:   - Ivf per orders, prn pain medications   - See additional Care Plan goals for specific interventions  Outcome: Progressing     Problem: GASTROINTESTINAL - ADULT  Goal: Minimal or absence of nausea and vomiting  Description: INTERVENTIONS:  - Maintain adequate hydration with IV or PO as ordered and tolerated  - Nasogastric tube to low intermittent suction as ordered  - Evaluate effectiveness of ordered antiemetic medications  - Provide nonpharmacologic comfort measures as appropriate  - Advance diet as tolerated, if ordered  - Obtain nutritional  consult as needed  - Evaluate fluid balance  Outcome: Progressing     Problem: SKIN/TISSUE INTEGRITY - ADULT  Goal: Skin integrity remains intact  Description: INTERVENTIONS  - Assess and document risk factors for pressure ulcer development  - Assess and document skin integrity  - Monitor for areas of redness and/or skin breakdown  - Initiate interventions, skin care algorithm/standards of care as needed  Outcome: Progressing     Problem: PAIN - ADULT  Goal: Verbalizes/displays adequate comfort level or patient's stated pain goal  Description: INTERVENTIONS:  - Encourage pt to monitor pain and request assistance  - Assess pain using appropriate pain scale  - Administer analgesics based on type and severity of pain and evaluate response  - Implement non-pharmacological measures as appropriate and evaluate response  - Consider cultural and social influences on pain and pain management  - Manage/alleviate anxiety  - Utilize distraction and/or relaxation techniques  - Monitor for opioid side effects  - Notify MD/LIP if interventions unsuccessful or patient reports new pain  - Anticipate increased pain with activity and pre-medicate as appropriate  Outcome: Progressing     Problem: CARDIOVASCULAR - ADULT  Goal: Absence of cardiac arrhythmias or at baseline  Description: INTERVENTIONS:  - Continuous cardiac monitoring, monitor vital signs, obtain 12 lead EKG if indicated  - Evaluate effectiveness of antiarrhythmic and heart rate control medications as ordered  - Initiate emergency measures for life threatening arrhythmias  - Monitor electrolytes and administer replacement therapy as ordered  Outcome: Progressing     Problem: METABOLIC/FLUID AND ELECTROLYTES - ADULT  Goal: Glucose maintained within prescribed range  Description: INTERVENTIONS:  - Monitor Blood Glucose as ordered  - Assess for signs and symptoms of hyperglycemia and hypoglycemia  - Administer ordered medications to maintain glucose within target range  -  Assess barriers to adequate nutritional intake and initiate nutrition consult as needed  - Instruct patient on self management of diabetes  Outcome: Progressing  Goal: Electrolytes maintained within normal limits  Description: INTERVENTIONS:  - Monitor labs and rhythm and assess patient for signs and symptoms of electrolyte imbalances  - Administer electrolyte replacement as ordered  - Monitor response to electrolyte replacements, including rhythm and repeat lab results as appropriate  - Fluid restriction as ordered  - Instruct patient on fluid and nutrition restrictions as appropriate  Outcome: Progressing

## 2025-05-24 NOTE — PROGRESS NOTES
[facility]  part of [facility]    Progress Note    Lissette Block Patient Status:  Inpatient    1970 MRN [id number]   Location [facility]5W Attending Eboni Capone MD   Hosp Day # 3 PCP PHYSICIAN NONSTAFF       Subjective:   Lissette Block vomited after trying to clear liquid diet. Having severe abdominal pain.    Objective:   Blood pressure 129/69, pulse 66, temperature 97.9 °F (36.6 °C), temperature source Oral, resp. rate 20, height 5' 5\" (1.651 m), weight 197 lb 8 oz (89.6 kg), SpO2 95%.    Physical Exam:    General: No acute distress.   Respiratory: Clear to auscultation bilaterally. No wheezes. No rhonchi.  Cardiovascular: S1, S2. Regular rate and rhythm. No murmurs, rubs or gallops.   Abdomen: Soft, nontender, nondistended.  Positive bowel sounds. No rebound or guarding.  Neurologic: No focal neurological deficits.   Musculoskeletal: Moves all extremities.  Extremities: No edema.    Results:     Lab Results   Component Value Date    WBC 8.1 2025    HGB 13.6 2025    HCT 42.3 2025    .0 2025    CREATSERUM 0.87 2025    BUN 7 (L) 2025     2025    K 3.7 2025     2025    CO2 27.0 2025     (H) 2025    CA 8.8 2025    ALB 4.2 2025    ALKPHO 49 2025    BILT 0.5 2025    TP 7.1 2025    AST 30 2025    ALT 54 (H) 2025    TSH 3.010 2024    LIP 54 (H) 2025    DDIMER 0.61 (H) 06/10/2024    TROP 0.00 2018    B12 816 2024       MRI ABDOMEN AND MRCP W/3D (W+W0)(CPT=74183/67609)  Result Date: 2025  CONCLUSION:   1. Question mild edema in the tail of the pancreas, in the right clinical setting may indicate acute interstitial pancreatitis.  No peripancreatic fluid collections are seen.  No pancreatic ductal dilatation.  No suspicious pancreatic lesions.  2. Status post cholecystectomy.  The common bile duct is dilated  measuring up to 1.1 cm in diameter with gradual tapering toward the ampulla.  This is favored to relate to post cholecystectomy status and is similar to the prior exam.  No choledocholithiasis.  3. Marked hepatic steatosis and hepatomegaly.  No focal suspicious lesion.  4. Trace bilateral pleural effusions, likely physiologic.    elm-remote     Dictated by (CST): Sukhjinder Vivas MD on 5/23/2025 at 6:35 PM     Finalized by (CST): Sukhjinder Vivas MD on 5/23/2025 at 6:42 PM            Scheduled Medications[1]        Assessment and Plan:       Acute epigastric pain  - secondary to acute pancreatitis  - IV fluids  - Pain control with IV morphine  - NPO now  - gI consult   - bowel rest   - Repeat labs and lipase in AM  - continue on IV zosyn   - MRCP reviewed  - IV diaudid ordered- attempting to wean but patient is in a lot of pain so will keep for now  - will discuss with GI  - ambulate TID        DM2  - SSI low dose  - accuechecks     DVT proph- heparin     Full     MDM. High      RORY BROWN MD  05/24/25           [1]    potassium chloride  20 mEq Intravenous Once    piperacillin-tazobactam  3.375 g Intravenous Q8H    heparin  5,000 Units Subcutaneous Q8H MARSHAL    insulin aspart  1-11 Units Subcutaneous TID CC    acidophilus  1 capsule Oral Daily    aspirin  81 mg Oral Daily    gabapentin  100 mg Oral Nightly    pantoprazole  40 mg Oral QAM AC

## 2025-05-25 LAB
ANION GAP SERPL CALC-SCNC: 7 MMOL/L (ref 0–18)
BUN BLD-MCNC: 5 MG/DL (ref 9–23)
BUN/CREAT SERPL: 6 (ref 10–20)
CALCIUM BLD-MCNC: 8.9 MG/DL (ref 8.7–10.4)
CHLORIDE SERPL-SCNC: 108 MMOL/L (ref 98–112)
CO2 SERPL-SCNC: 24 MMOL/L (ref 21–32)
CREAT BLD-MCNC: 0.83 MG/DL (ref 0.55–1.02)
DEPRECATED RDW RBC AUTO: 39 FL (ref 35.1–46.3)
EGFRCR SERPLBLD CKD-EPI 2021: 84 ML/MIN/1.73M2 (ref 60–?)
ERYTHROCYTE [DISTWIDTH] IN BLOOD BY AUTOMATED COUNT: 12.6 % (ref 11–15)
GLUCOSE BLD-MCNC: 139 MG/DL (ref 70–99)
GLUCOSE BLDC GLUCOMTR-MCNC: 128 MG/DL (ref 70–99)
GLUCOSE BLDC GLUCOMTR-MCNC: 131 MG/DL (ref 70–99)
GLUCOSE BLDC GLUCOMTR-MCNC: 131 MG/DL (ref 70–99)
GLUCOSE BLDC GLUCOMTR-MCNC: 135 MG/DL (ref 70–99)
GLUCOSE BLDC GLUCOMTR-MCNC: 137 MG/DL (ref 70–99)
HCT VFR BLD AUTO: 45.4 % (ref 35–48)
HGB BLD-MCNC: 14.5 G/DL (ref 12–16)
MCH RBC QN AUTO: 27.5 PG (ref 26–34)
MCHC RBC AUTO-ENTMCNC: 31.9 G/DL (ref 31–37)
MCV RBC AUTO: 86 FL (ref 80–100)
OSMOLALITY SERPL CALC.SUM OF ELEC: 288 MOSM/KG (ref 275–295)
PLATELET # BLD AUTO: 258 10(3)UL (ref 150–450)
POTASSIUM SERPL-SCNC: 3.9 MMOL/L (ref 3.5–5.1)
POTASSIUM SERPL-SCNC: 3.9 MMOL/L (ref 3.5–5.1)
RBC # BLD AUTO: 5.28 X10(6)UL (ref 3.8–5.3)
SODIUM SERPL-SCNC: 139 MMOL/L (ref 136–145)
WBC # BLD AUTO: 8.8 X10(3) UL (ref 4–11)

## 2025-05-25 PROCEDURE — 99232 SBSQ HOSP IP/OBS MODERATE 35: CPT | Performed by: INTERNAL MEDICINE

## 2025-05-25 PROCEDURE — 99233 SBSQ HOSP IP/OBS HIGH 50: CPT | Performed by: HOSPITALIST

## 2025-05-25 RX ORDER — HYDROCODONE BITARTRATE AND ACETAMINOPHEN 5; 325 MG/1; MG/1
1 TABLET ORAL EVERY 6 HOURS PRN
Refills: 0 | Status: DISCONTINUED | OUTPATIENT
Start: 2025-05-25 | End: 2025-05-25

## 2025-05-25 NOTE — PLAN OF CARE
Pt alert and oriented x4. Independent. IVF. IV Zosyn. Full liquid diet, tolerating well. PRN Norco. PRN Maalox w/ meals. Pt updated on plan of care. Safety precautions in place. Call light within reach.    Problem: Patient Centered Care  Goal: Patient preferences are identified and integrated in the patient's plan of care  Description: Interventions:  - What would you like us to know as we care for you? From home with   - Provide timely, complete, and accurate information to patient/family  - Incorporate patient and family knowledge, values, beliefs, and cultural backgrounds into the planning and delivery of care  - Encourage patient/family to participate in care and decision-making at the level they choose  - Honor patient and family perspectives and choices  Outcome: Progressing     Problem: Patient/Family Goals  Goal: Patient/Family Long Term Goal  Description: Patient's Long Term Goal: Discharge     Interventions:  - Administer meds as ordered including IVF, pain meds, IV abx  - Monitor VS, labs, and diagnostic studies  - Follow MD orders  - See additional Care Plan goals for specific interventions  Outcome: Progressing  Goal: Patient/Family Short Term Goal  Description: Patient's Short Term Goal: To feel better    Interventions:   - Administer meds as ordered including IVF, pain meds, IV abx  - Monitor VS, labs, and diagnostic studies  - Follow MD orders  - See additional Care Plan goals for specific interventions  Outcome: Progressing     Problem: GASTROINTESTINAL - ADULT  Goal: Minimal or absence of nausea and vomiting  Description: INTERVENTIONS:  - Maintain adequate hydration with IV or PO as ordered and tolerated  - Nasogastric tube to low intermittent suction as ordered  - Evaluate effectiveness of ordered antiemetic medications  - Provide nonpharmacologic comfort measures as appropriate  - Advance diet as tolerated, if ordered  - Obtain nutritional consult as needed  - Evaluate fluid  balance  Outcome: Progressing     Problem: SKIN/TISSUE INTEGRITY - ADULT  Goal: Skin integrity remains intact  Description: INTERVENTIONS  - Assess and document risk factors for pressure ulcer development  - Assess and document skin integrity  - Monitor for areas of redness and/or skin breakdown  - Initiate interventions, skin care algorithm/standards of care as needed  Outcome: Progressing     Problem: PAIN - ADULT  Goal: Verbalizes/displays adequate comfort level or patient's stated pain goal  Description: INTERVENTIONS:  - Encourage pt to monitor pain and request assistance  - Assess pain using appropriate pain scale  - Administer analgesics based on type and severity of pain and evaluate response  - Implement non-pharmacological measures as appropriate and evaluate response  - Consider cultural and social influences on pain and pain management  - Manage/alleviate anxiety  - Utilize distraction and/or relaxation techniques  - Monitor for opioid side effects  - Notify MD/LIP if interventions unsuccessful or patient reports new pain  - Anticipate increased pain with activity and pre-medicate as appropriate  Outcome: Progressing     Problem: CARDIOVASCULAR - ADULT  Goal: Absence of cardiac arrhythmias or at baseline  Description: INTERVENTIONS:  - Continuous cardiac monitoring, monitor vital signs, obtain 12 lead EKG if indicated  - Evaluate effectiveness of antiarrhythmic and heart rate control medications as ordered  - Initiate emergency measures for life threatening arrhythmias  - Monitor electrolytes and administer replacement therapy as ordered  Outcome: Progressing     Problem: METABOLIC/FLUID AND ELECTROLYTES - ADULT  Goal: Glucose maintained within prescribed range  Description: INTERVENTIONS:  - Monitor Blood Glucose as ordered  - Assess for signs and symptoms of hyperglycemia and hypoglycemia  - Administer ordered medications to maintain glucose within target range  - Assess barriers to adequate  nutritional intake and initiate nutrition consult as needed  - Instruct patient on self management of diabetes  Outcome: Progressing  Goal: Electrolytes maintained within normal limits  Description: INTERVENTIONS:  - Monitor labs and rhythm and assess patient for signs and symptoms of electrolyte imbalances  - Administer electrolyte replacement as ordered  - Monitor response to electrolyte replacements, including rhythm and repeat lab results as appropriate  - Fluid restriction as ordered  - Instruct patient on fluid and nutrition restrictions as appropriate  Outcome: Progressing     Problem: SAFETY ADULT - FALL  Goal: Free from fall injury  Description: INTERVENTIONS:  - Assess pt frequently for physical needs  - Identify cognitive and physical deficits and behaviors that affect risk of falls.  - Hilbert fall precautions as indicated by assessment.  - Educate pt/family on patient safety including physical limitations  - Instruct pt to call for assistance with activity based on assessment  - Modify environment to reduce risk of injury  - Provide assistive devices as appropriate  - Consider OT/PT consult to assist with strengthening/mobility  - Encourage toileting schedule  Outcome: Progressing

## 2025-05-25 NOTE — PLAN OF CARE
Problem: Patient Centered Care  Goal: Patient preferences are identified and integrated in the patient's plan of care  Description: Interventions:  - What would you like us to know as we care for you? From home with   - Provide timely, complete, and accurate information to patient/family  - Incorporate patient and family knowledge, values, beliefs, and cultural backgrounds into the planning and delivery of care  - Encourage patient/family to participate in care and decision-making at the level they choose  - Honor patient and family perspectives and choices  Outcome: Progressing     Problem: Patient/Family Goals  Goal: Patient/Family Long Term Goal  Description: Patient's Long Term Goal: Discharge     Interventions:  - Administer meds as ordered including IVF, pain meds, IV abx  - Monitor VS, labs, and diagnostic studies  - Follow MD orders  - See additional Care Plan goals for specific interventions  Outcome: Progressing  Goal: Patient/Family Short Term Goal  Description: Patient's Short Term Goal: To feel better    Interventions:   - Administer meds as ordered including IVF, pain meds, IV abx  - Monitor VS, labs, and diagnostic studies  - Follow MD orders  - See additional Care Plan goals for specific interventions  Outcome: Progressing     Problem: GASTROINTESTINAL - ADULT  Goal: Minimal or absence of nausea and vomiting  Description: INTERVENTIONS:  - Maintain adequate hydration with IV or PO as ordered and tolerated  - Nasogastric tube to low intermittent suction as ordered  - Evaluate effectiveness of ordered antiemetic medications  - Provide nonpharmacologic comfort measures as appropriate  - Advance diet as tolerated, if ordered  - Obtain nutritional consult as needed  - Evaluate fluid balance  Outcome: Progressing     Problem: SKIN/TISSUE INTEGRITY - ADULT  Goal: Skin integrity remains intact  Description: INTERVENTIONS  - Assess and document risk factors for pressure ulcer development  - Assess  and document skin integrity  - Monitor for areas of redness and/or skin breakdown  - Initiate interventions, skin care algorithm/standards of care as needed  Outcome: Progressing     Problem: PAIN - ADULT  Goal: Verbalizes/displays adequate comfort level or patient's stated pain goal  Description: INTERVENTIONS:  - Encourage pt to monitor pain and request assistance  - Assess pain using appropriate pain scale  - Administer analgesics based on type and severity of pain and evaluate response  - Implement non-pharmacological measures as appropriate and evaluate response  - Consider cultural and social influences on pain and pain management  - Manage/alleviate anxiety  - Utilize distraction and/or relaxation techniques  - Monitor for opioid side effects  - Notify MD/LIP if interventions unsuccessful or patient reports new pain  - Anticipate increased pain with activity and pre-medicate as appropriate  Outcome: Progressing     Problem: CARDIOVASCULAR - ADULT  Goal: Absence of cardiac arrhythmias or at baseline  Description: INTERVENTIONS:  - Continuous cardiac monitoring, monitor vital signs, obtain 12 lead EKG if indicated  - Evaluate effectiveness of antiarrhythmic and heart rate control medications as ordered  - Initiate emergency measures for life threatening arrhythmias  - Monitor electrolytes and administer replacement therapy as ordered  Outcome: Progressing     Problem: METABOLIC/FLUID AND ELECTROLYTES - ADULT  Goal: Glucose maintained within prescribed range  Description: INTERVENTIONS:  - Monitor Blood Glucose as ordered  - Assess for signs and symptoms of hyperglycemia and hypoglycemia  - Administer ordered medications to maintain glucose within target range  - Assess barriers to adequate nutritional intake and initiate nutrition consult as needed  - Instruct patient on self management of diabetes  Outcome: Progressing  Goal: Electrolytes maintained within normal limits  Description: INTERVENTIONS:  - Monitor  labs and rhythm and assess patient for signs and symptoms of electrolyte imbalances  - Administer electrolyte replacement as ordered  - Monitor response to electrolyte replacements, including rhythm and repeat lab results as appropriate  - Fluid restriction as ordered  - Instruct patient on fluid and nutrition restrictions as appropriate  Outcome: Progressing     Problem: SAFETY ADULT - FALL  Goal: Free from fall injury  Description: INTERVENTIONS:  - Assess pt frequently for physical needs  - Identify cognitive and physical deficits and behaviors that affect risk of falls.  - [place] fall precautions as indicated by assessment.  - Educate pt/family on patient safety including physical limitations  - Instruct pt to call for assistance with activity based on assessment  - Modify environment to reduce risk of injury  - Provide assistive devices as appropriate  - Consider OT/PT consult to assist with strengthening/mobility  - Encourage toileting schedule  Outcome: Progressing

## 2025-05-26 VITALS
HEART RATE: 60 BPM | SYSTOLIC BLOOD PRESSURE: 131 MMHG | RESPIRATION RATE: 16 BRPM | TEMPERATURE: 98 F | WEIGHT: 197.5 LBS | DIASTOLIC BLOOD PRESSURE: 76 MMHG | OXYGEN SATURATION: 96 % | HEIGHT: 65 IN | BODY MASS INDEX: 32.9 KG/M2

## 2025-05-26 LAB
ALBUMIN SERPL-MCNC: 3.9 G/DL (ref 3.2–4.8)
ALBUMIN/GLOB SERPL: 1.5 {RATIO} (ref 1–2)
ALP LIVER SERPL-CCNC: 48 U/L (ref 41–108)
ALT SERPL-CCNC: 48 U/L (ref 10–49)
ANION GAP SERPL CALC-SCNC: 7 MMOL/L (ref 0–18)
AST SERPL-CCNC: 28 U/L (ref ?–34)
BILIRUB SERPL-MCNC: 0.7 MG/DL (ref 0.3–1.2)
BUN BLD-MCNC: 6 MG/DL (ref 9–23)
BUN/CREAT SERPL: 7.3 (ref 10–20)
CALCIUM BLD-MCNC: 8.8 MG/DL (ref 8.7–10.4)
CHLORIDE SERPL-SCNC: 109 MMOL/L (ref 98–112)
CO2 SERPL-SCNC: 25 MMOL/L (ref 21–32)
CREAT BLD-MCNC: 0.82 MG/DL (ref 0.55–1.02)
DEPRECATED RDW RBC AUTO: 38.7 FL (ref 35.1–46.3)
EGFRCR SERPLBLD CKD-EPI 2021: 85 ML/MIN/1.73M2 (ref 60–?)
ERYTHROCYTE [DISTWIDTH] IN BLOOD BY AUTOMATED COUNT: 12.7 % (ref 11–15)
GLOBULIN PLAS-MCNC: 2.6 G/DL (ref 2–3.5)
GLUCOSE BLD-MCNC: 164 MG/DL (ref 70–99)
GLUCOSE BLDC GLUCOMTR-MCNC: 109 MG/DL (ref 70–99)
GLUCOSE BLDC GLUCOMTR-MCNC: 154 MG/DL (ref 70–99)
GLUCOSE BLDC GLUCOMTR-MCNC: 156 MG/DL (ref 70–99)
GLUCOSE BLDC GLUCOMTR-MCNC: 159 MG/DL (ref 70–99)
HCT VFR BLD AUTO: 43.2 % (ref 35–48)
HGB BLD-MCNC: 14.1 G/DL (ref 12–16)
MCH RBC QN AUTO: 28 PG (ref 26–34)
MCHC RBC AUTO-ENTMCNC: 32.6 G/DL (ref 31–37)
MCV RBC AUTO: 85.7 FL (ref 80–100)
OSMOLALITY SERPL CALC.SUM OF ELEC: 293 MOSM/KG (ref 275–295)
PLATELET # BLD AUTO: 254 10(3)UL (ref 150–450)
POTASSIUM SERPL-SCNC: 3.7 MMOL/L (ref 3.5–5.1)
PROT SERPL-MCNC: 6.5 G/DL (ref 5.7–8.2)
RBC # BLD AUTO: 5.04 X10(6)UL (ref 3.8–5.3)
SODIUM SERPL-SCNC: 141 MMOL/L (ref 136–145)
WBC # BLD AUTO: 7.6 X10(3) UL (ref 4–11)

## 2025-05-26 PROCEDURE — 99239 HOSP IP/OBS DSCHRG MGMT >30: CPT | Performed by: HOSPITALIST

## 2025-05-26 RX ORDER — POTASSIUM CHLORIDE 1500 MG/1
40 TABLET, EXTENDED RELEASE ORAL ONCE
Status: COMPLETED | OUTPATIENT
Start: 2025-05-26 | End: 2025-05-26

## 2025-05-26 NOTE — PLAN OF CARE
Patient discharged to home. IV removed, discharge education provided, patient sent with all personal belongings, and discharge instructions. Addressed additional questions.    Problem: Patient Centered Care  Goal: Patient preferences are identified and integrated in the patient's plan of care  Description: Interventions:  - What would you like us to know as we care for you? From home with   - Provide timely, complete, and accurate information to patient/family  - Incorporate patient and family knowledge, values, beliefs, and cultural backgrounds into the planning and delivery of care  - Encourage patient/family to participate in care and decision-making at the level they choose  - Honor patient and family perspectives and choices  Outcome: Adequate for Discharge     Problem: Patient/Family Goals  Goal: Patient/Family Long Term Goal  Description: Patient's Long Term Goal: Discharge     Interventions:  - Administer meds as ordered including IVF, pain meds, IV abx  - Monitor VS, labs, and diagnostic studies  - Follow MD orders  - See additional Care Plan goals for specific interventions  Outcome: Adequate for Discharge  Goal: Patient/Family Short Term Goal  Description: Patient's Short Term Goal: To feel better    Interventions:   - Administer meds as ordered including IVF, pain meds, IV abx  - Monitor VS, labs, and diagnostic studies  - Follow MD orders  - See additional Care Plan goals for specific interventions  Outcome: Adequate for Discharge     Problem: GASTROINTESTINAL - ADULT  Goal: Minimal or absence of nausea and vomiting  Description: INTERVENTIONS:  - Maintain adequate hydration with IV or PO as ordered and tolerated  - Nasogastric tube to low intermittent suction as ordered  - Evaluate effectiveness of ordered antiemetic medications  - Provide nonpharmacologic comfort measures as appropriate  - Advance diet as tolerated, if ordered  - Obtain nutritional consult as needed  - Evaluate fluid  balance  Outcome: Adequate for Discharge     Problem: SKIN/TISSUE INTEGRITY - ADULT  Goal: Skin integrity remains intact  Description: INTERVENTIONS  - Assess and document risk factors for pressure ulcer development  - Assess and document skin integrity  - Monitor for areas of redness and/or skin breakdown  - Initiate interventions, skin care algorithm/standards of care as needed  Outcome: Adequate for Discharge     Problem: PAIN - ADULT  Goal: Verbalizes/displays adequate comfort level or patient's stated pain goal  Description: INTERVENTIONS:  - Encourage pt to monitor pain and request assistance  - Assess pain using appropriate pain scale  - Administer analgesics based on type and severity of pain and evaluate response  - Implement non-pharmacological measures as appropriate and evaluate response  - Consider cultural and social influences on pain and pain management  - Manage/alleviate anxiety  - Utilize distraction and/or relaxation techniques  - Monitor for opioid side effects  - Notify MD/LIP if interventions unsuccessful or patient reports new pain  - Anticipate increased pain with activity and pre-medicate as appropriate  Outcome: Adequate for Discharge     Problem: CARDIOVASCULAR - ADULT  Goal: Absence of cardiac arrhythmias or at baseline  Description: INTERVENTIONS:  - Continuous cardiac monitoring, monitor vital signs, obtain 12 lead EKG if indicated  - Evaluate effectiveness of antiarrhythmic and heart rate control medications as ordered  - Initiate emergency measures for life threatening arrhythmias  - Monitor electrolytes and administer replacement therapy as ordered  Outcome: Adequate for Discharge     Problem: METABOLIC/FLUID AND ELECTROLYTES - ADULT  Goal: Glucose maintained within prescribed range  Description: INTERVENTIONS:  - Monitor Blood Glucose as ordered  - Assess for signs and symptoms of hyperglycemia and hypoglycemia  - Administer ordered medications to maintain glucose within target  range  - Assess barriers to adequate nutritional intake and initiate nutrition consult as needed  - Instruct patient on self management of diabetes  Outcome: Adequate for Discharge  Goal: Electrolytes maintained within normal limits  Description: INTERVENTIONS:  - Monitor labs and rhythm and assess patient for signs and symptoms of electrolyte imbalances  - Administer electrolyte replacement as ordered  - Monitor response to electrolyte replacements, including rhythm and repeat lab results as appropriate  - Fluid restriction as ordered  - Instruct patient on fluid and nutrition restrictions as appropriate  Outcome: Adequate for Discharge     Problem: SAFETY ADULT - FALL  Goal: Free from fall injury  Description: INTERVENTIONS:  - Assess pt frequently for physical needs  - Identify cognitive and physical deficits and behaviors that affect risk of falls.  - [place] fall precautions as indicated by assessment.  - Educate pt/family on patient safety including physical limitations  - Instruct pt to call for assistance with activity based on assessment  - Modify environment to reduce risk of injury  - Provide assistive devices as appropriate  - Consider OT/PT consult to assist with strengthening/mobility  - Encourage toileting schedule  Outcome: Adequate for Discharge

## 2025-05-26 NOTE — CM/SW NOTE
05/26/25 1300   Discharge disposition   Expected discharge disposition Home or Self   Discharge transportation Private car     Mayra GRAJEDAN RN CRRMONA CHRISTIAN  RN Case Manager  [phone]

## 2025-05-26 NOTE — PROGRESS NOTES
[facility]  GI SERVICE PROGRESS NOTE    Lissette Block Patient Status:  Inpatient    1970 MRN [id number]   Location [facility]5W Attending Eboni Capone MD   Hosp Day # 4 PCP PHYSICIAN NONSTAFF       Subjective:     Tolerating full liquid diet today without complaints.    Last doses intravenous morphine/Dilaudid were yesterday/last night 2025  1 dose Norco 5 mg only today.    Dramatic improvement.  Bright and smiling, states pain is practically resolved, tolerated soup today, wants to go home.    Afebrile; HR 60s; normotensive; pulse oximetry 96-90% on room air    Objective:   Blood pressure 130/67, pulse 66, temperature 97.9 °F (36.6 °C), temperature source Oral, resp. rate 18, height 5' 5\" (1.651 m), weight 197 lb 8 oz (89.6 kg), SpO2 96%.    GENERAL: Smiling, sitting on bed watching movies  HEENT: Normocephalic; pupils equally round and reactive to light; no temporal wasting; no thyromegaly or cervical lymphadenopathy  PULM: Breathing and speaking comfortably  ABD: soft/nondistended  EXT: No edema  SKIN: No rash        Results:       Recent Labs   Lab 25  1046 25  0525  0522 25  0825  0553   RBC 5.32* 4.78 4.69 4.99 5.28   HGB 14.8 13.4 13.2 13.6 14.5   HCT 44.9 42.1 41.6 42.3 45.4   MCV 84.4 88.1 88.7 84.8 86.0   MCH 27.8 28.0 28.1 27.3 27.5   MCHC 33.0 31.8 31.7 32.2 31.9   RDW 13.0 12.9 12.5 12.5 12.6   NEPRELIM 5.99 5.33 4.08  --   --    WBC 11.7* 10.5 8.7 8.1 8.8   .0 259.0 242.0 266.0 258.0       No results found for: \"PT\", \"INR\"    Recent Labs   Lab 25  0520 25  0522 25  0822 25  0553   * 142* 137* 139*   BUN 12 10 7* 5*   CREATSERUM 0.76 0.84 0.87 0.83   CA 8.3* 8.4* 8.8 8.9   ALB 3.8 3.8 4.2  --     138 136 139   K 4.1 3.9 3.7 3.9  3.9    106 106 108   CO2 24.0 25.0 27.0 24.0   ALKPHO 48 45 49  --    AST 47* 36* 30  --    ALT 65* 61* 54*  --    BILT 0.4 0.5 0.5  --    TP 6.4  6.5 7.1  --        Recent Labs   Lab 05/21/25  1046 05/22/25  1118   LIP 60* 54*       No results for input(s): \"ESRML\", \"ESRPF\", \"CRP\", \"MG\", \"PHOS\", \"TROP\", \"B12\" in the last 168 hours.    No results for input(s): \"URINE\", \"CULTI\", \"BLDSMR\" in the last 168 hours.      No results found.        Assessment and Plan:     54 year old woman with previous history of Type 2 diabetes who presented to ER 5/21/2025 for c/o 2 days of worsening epigastric pain. She notes pain has been constant a sharp. Patient tried to eat at home and had worsening pain. She had mild nausea, no vomiting. Notes prior to two days ago, was tolerating diet well. No decreased appetite or weight loss.     Previous history cholecystectomy surgery    Initial evaluation showed lipase of 60, slight spike in LFTs, AST 59 ALT 70.  Initial CT scan shows hepatic steatosis, \"PANCREAS: Faint fat stranding seen adjacent to the distal pancreatic body/pancreatic tail. \"    Question of mild biliary event.    MRI MRCP 5/23/2025:  Hepatomegaly and hepatic steatosis  Postcholecystectomy moderate diffuse biliary dilation, 11 mm CBD with normal tapering into the ampulla.  No biliary obstruction or choledocholithiasis.  \"PANCREAS: Question mild edema in the body of the pancreas relative to the head and uncinate process \"  No biliary or pancreatic lesion.    5/24/2025: normal CBC; LFTs improving with AST 30 ALT 54  5/25/2025: Normal CBC; normal renal function; feeling much better      Recommend:  Discharge home tonight or tomorrow with instructions to continue a light low-fat diet, advance slowly from full liquids back to solids      Telly Way MD        Over 25 minutes spent today reviewing today's and recent data; greater than 50% of that time was spent counseling the patient and coordination of care with RN and other involved physicians.    Digital transcription software was utilized to produce this note. The note was proofread for content only.  Typographical errors may remain.

## 2025-05-26 NOTE — PLAN OF CARE
Problem: Patient Centered Care  Goal: Patient preferences are identified and integrated in the patient's plan of care  Description: Interventions:  - What would you like us to know as we care for you? From home with   - Provide timely, complete, and accurate information to patient/family  - Incorporate patient and family knowledge, values, beliefs, and cultural backgrounds into the planning and delivery of care  - Encourage patient/family to participate in care and decision-making at the level they choose  - Honor patient and family perspectives and choices  Outcome: Progressing     Problem: Patient/Family Goals  Goal: Patient/Family Long Term Goal  Description: Patient's Long Term Goal: Discharge     Interventions:  - Administer meds as ordered including IVF, pain meds, IV abx  - Monitor VS, labs, and diagnostic studies  - Follow MD orders  - See additional Care Plan goals for specific interventions  Outcome: Progressing  Goal: Patient/Family Short Term Goal  Description: Patient's Short Term Goal: To feel better    Interventions:   - Administer meds as ordered including IVF, pain meds, IV abx  - Monitor VS, labs, and diagnostic studies  - Follow MD orders  - See additional Care Plan goals for specific interventions  Outcome: Progressing     Problem: GASTROINTESTINAL - ADULT  Goal: Minimal or absence of nausea and vomiting  Description: INTERVENTIONS:  - Maintain adequate hydration with IV or PO as ordered and tolerated  - Nasogastric tube to low intermittent suction as ordered  - Evaluate effectiveness of ordered antiemetic medications  - Provide nonpharmacologic comfort measures as appropriate  - Advance diet as tolerated, if ordered  - Obtain nutritional consult as needed  - Evaluate fluid balance  Outcome: Progressing     Problem: SKIN/TISSUE INTEGRITY - ADULT  Goal: Skin integrity remains intact  Description: INTERVENTIONS  - Assess and document risk factors for pressure ulcer development  - Assess  and document skin integrity  - Monitor for areas of redness and/or skin breakdown  - Initiate interventions, skin care algorithm/standards of care as needed  Outcome: Progressing     Problem: PAIN - ADULT  Goal: Verbalizes/displays adequate comfort level or patient's stated pain goal  Description: INTERVENTIONS:  - Encourage pt to monitor pain and request assistance  - Assess pain using appropriate pain scale  - Administer analgesics based on type and severity of pain and evaluate response  - Implement non-pharmacological measures as appropriate and evaluate response  - Consider cultural and social influences on pain and pain management  - Manage/alleviate anxiety  - Utilize distraction and/or relaxation techniques  - Monitor for opioid side effects  - Notify MD/LIP if interventions unsuccessful or patient reports new pain  - Anticipate increased pain with activity and pre-medicate as appropriate  Outcome: Progressing     Problem: CARDIOVASCULAR - ADULT  Goal: Absence of cardiac arrhythmias or at baseline  Description: INTERVENTIONS:  - Continuous cardiac monitoring, monitor vital signs, obtain 12 lead EKG if indicated  - Evaluate effectiveness of antiarrhythmic and heart rate control medications as ordered  - Initiate emergency measures for life threatening arrhythmias  - Monitor electrolytes and administer replacement therapy as ordered  Outcome: Progressing     Problem: METABOLIC/FLUID AND ELECTROLYTES - ADULT  Goal: Glucose maintained within prescribed range  Description: INTERVENTIONS:  - Monitor Blood Glucose as ordered  - Assess for signs and symptoms of hyperglycemia and hypoglycemia  - Administer ordered medications to maintain glucose within target range  - Assess barriers to adequate nutritional intake and initiate nutrition consult as needed  - Instruct patient on self management of diabetes  Outcome: Progressing  Goal: Electrolytes maintained within normal limits  Description: INTERVENTIONS:  - Monitor  labs and rhythm and assess patient for signs and symptoms of electrolyte imbalances  - Administer electrolyte replacement as ordered  - Monitor response to electrolyte replacements, including rhythm and repeat lab results as appropriate  - Fluid restriction as ordered  - Instruct patient on fluid and nutrition restrictions as appropriate  Outcome: Progressing     Problem: SAFETY ADULT - FALL  Goal: Free from fall injury  Description: INTERVENTIONS:  - Assess pt frequently for physical needs  - Identify cognitive and physical deficits and behaviors that affect risk of falls.  - [place] fall precautions as indicated by assessment.  - Educate pt/family on patient safety including physical limitations  - Instruct pt to call for assistance with activity based on assessment  - Modify environment to reduce risk of injury  - Provide assistive devices as appropriate  - Consider OT/PT consult to assist with strengthening/mobility  - Encourage toileting schedule  Outcome: Progressing

## 2025-05-26 NOTE — DISCHARGE SUMMARY
[facility]  part of [facility]    Discharge Summary    Lissette Block Patient Status:  Inpatient    1970 MRN [id number]   Location [facility]5W Attending Lorena Bello MD   Hosp Day # 5 PCP PHYSICIAN NONSTAFF     Date of Admission: 2025      Date of Discharge: 25      Admitting Diagnosis: Acute pancreatitis, unspecified complication status, unspecified pancreatitis type (HCC) [K85.90]    Hospital Discharge Diagnoses: Acute pancreatitis    Lace+ Score: 42  59-90 High Risk  29-58 Medium Risk  0-28   Low Risk.    TCM Follow-Up Recommendation:  LACE 29-58: Moderate Risk of readmission after discharge from the hospital.          Problem List: Problem List[1]      Physical Exam:     Gen: No acute distress  Pulm: Lungs clear, normal respiratory effort  CV: Heart with regular rate and rhythm  Abd: Abdomen soft, nontender, nondistended, bowel sounds present  Neuro: No acute focal deficits  MSK: moves extremities  Skin: Warm and dry  Psych: Normal affect  Ext: no c/c/e      History of Present Illness: Per Dr Capone    Lissette Block is a(n) 54 year old female who has a pmh of DM2 who was admitted for severe epigastric pain for the past 3 days. She also reports some chest pain.Blood sugars have also been uncontrolled. She denies fever/chills, sob and nausea/vomiting. CT scan of the abdomen and pelvis revealed no obstruction but mild pancreatitis. IV fluids, IV morphine have been given with minimal relief.  BP in the /70, HR 65, 97.6. Patient drank coffee and eat cookies this morning but the pain persisted. She will be admitted to the medical floor for further treatment and workup.    Hospital Course:     Acute epigastric pain  - secondary to acute pancreatitis  - gI consulted  - MRCP reviewed  - IV diaudid ordered- attempting to wean  - no longer needing  - resolved      DM2  - resume home meds    Discharge Condition: Stable    Discharge Medications:       Discharge Medications        CONTINUE taking these medications        Instructions Prescription details   acidophilus-pectin Caps  Commonly known as: Probiotic      Take 1 capsule by mouth daily.   Refills: 0     aspirin 81 MG Tbec      Take 1 tablet (81 mg total) by mouth in the morning.   Refills: 0     B COMPLEX-C ER OR      Take 1 tablet by mouth daily.   Refills: 0     empagliflozin 10 MG Tabs  Commonly known as: Jardiance      Take 1 tablet (10 mg total) by mouth daily.   Quantity: 90 tablet  Refills: 3     gabapentin 100 MG Caps  Commonly known as: Neurontin      Take 1 capsule (100 mg total) by mouth nightly.   Quantity: 90 capsule  Refills: 0     lisinopril 5 MG Tabs  Commonly known as: Prinivil; Zestril      Take 1 tablet (5 mg total) by mouth daily.   Quantity: 90 tablet  Refills: 3     metFORMIN 500 MG Tabs  Commonly known as: Glucophage      Take 1 tablet (500 mg total) by mouth daily with breakfast.   Quantity: 90 tablet  Refills: 1     OMEGA-3 FISH OIL OR      Take 1 capsule by mouth daily.   Refills: 0     omeprazole 20 MG Cpdr  Commonly known as: PriLOSEC      Take 1 capsule (20 mg total) by mouth every morning before breakfast.   Refills: 0     VITAMIN E OR      Take 1 tablet by mouth daily.   Refills: 0            STOP taking these medications      ibuprofen 600 MG Tabs  Commonly known as: Motrin        Meloxicam 15 MG Tabs                     Lorena Bello MD  5/26/2025  12:58 PM    Greater than 30 minutes spent on preparation and coordination of this discharge       [1]   Patient Active Problem List  Diagnosis    Microscopic hematuria    Dysuria    OAB (overactive bladder)    Right wrist tendonitis    Excessive or frequent menstruation    IUD mechanical complication    Heel pain, bilateral    Posterior tibial tendon dysfunction, left    TIA (transient ischemic attack)    Post-op pain    Acute pancreatitis, unspecified complication status, unspecified pancreatitis type (HCC)     Severe acute pancreatitis (HCC)

## 2025-05-27 ENCOUNTER — TELEPHONE (OUTPATIENT)
Dept: FAMILY MEDICINE CLINIC | Facility: CLINIC | Age: 55
End: 2025-05-27

## 2025-05-27 NOTE — TELEPHONE ENCOUNTER
Please advise if ok to use a virtual appointment or able to add patient for hospital follow up.     Patient states she was just discharged from [facility] yesterday, admitted for acute pancreatitis. Patent asking for a follow up appointment tomorrow with Vee FISHER.  Patient declining appointment with any other provider.

## 2025-05-29 ENCOUNTER — LAB ENCOUNTER (OUTPATIENT)
Dept: LAB | Age: 55
End: 2025-05-29
Attending: NURSE PRACTITIONER
Payer: MEDICAID

## 2025-05-29 ENCOUNTER — OFFICE VISIT (OUTPATIENT)
Dept: FAMILY MEDICINE CLINIC | Facility: CLINIC | Age: 55
End: 2025-05-29

## 2025-05-29 VITALS
HEIGHT: 65 IN | WEIGHT: 189.19 LBS | DIASTOLIC BLOOD PRESSURE: 74 MMHG | BODY MASS INDEX: 31.52 KG/M2 | SYSTOLIC BLOOD PRESSURE: 111 MMHG | HEART RATE: 85 BPM

## 2025-05-29 DIAGNOSIS — R25.2 LEG CRAMPS: ICD-10-CM

## 2025-05-29 DIAGNOSIS — R10.13 EPIGASTRIC PAIN: ICD-10-CM

## 2025-05-29 DIAGNOSIS — E11.9 TYPE 2 DIABETES MELLITUS WITHOUT COMPLICATION, WITHOUT LONG-TERM CURRENT USE OF INSULIN (HCC): ICD-10-CM

## 2025-05-29 DIAGNOSIS — K85.90: ICD-10-CM

## 2025-05-29 DIAGNOSIS — Z09 HOSPITAL DISCHARGE FOLLOW-UP: Primary | ICD-10-CM

## 2025-05-29 LAB
ALBUMIN SERPL-MCNC: 4.7 G/DL (ref 3.2–4.8)
ALBUMIN/GLOB SERPL: 1.3 {RATIO} (ref 1–2)
ALP LIVER SERPL-CCNC: 70 U/L (ref 41–108)
ALT SERPL-CCNC: 92 U/L (ref 10–49)
ANION GAP SERPL CALC-SCNC: 10 MMOL/L (ref 0–18)
AST SERPL-CCNC: 83 U/L (ref ?–34)
BILIRUB SERPL-MCNC: 0.7 MG/DL (ref 0.3–1.2)
BUN BLD-MCNC: 12 MG/DL (ref 9–23)
BUN/CREAT SERPL: 13 (ref 10–20)
CALCIUM BLD-MCNC: 9.9 MG/DL (ref 8.7–10.4)
CHLORIDE SERPL-SCNC: 105 MMOL/L (ref 98–112)
CO2 SERPL-SCNC: 20 MMOL/L (ref 21–32)
CREAT BLD-MCNC: 0.92 MG/DL (ref 0.55–1.02)
EGFRCR SERPLBLD CKD-EPI 2021: 74 ML/MIN/1.73M2 (ref 60–?)
FASTING STATUS PATIENT QL REPORTED: NO
GLOBULIN PLAS-MCNC: 3.7 G/DL (ref 2–3.5)
GLUCOSE BLD-MCNC: 113 MG/DL (ref 70–99)
LIPASE SERPL-CCNC: 104 U/L (ref 12–53)
MAGNESIUM SERPL-MCNC: 2.3 MG/DL (ref 1.6–2.6)
OSMOLALITY SERPL CALC.SUM OF ELEC: 281 MOSM/KG (ref 275–295)
POTASSIUM SERPL-SCNC: 4.6 MMOL/L (ref 3.5–5.1)
PROT SERPL-MCNC: 8.4 G/DL (ref 5.7–8.2)
SODIUM SERPL-SCNC: 135 MMOL/L (ref 136–145)

## 2025-05-29 PROCEDURE — 99214 OFFICE O/P EST MOD 30 MIN: CPT | Performed by: NURSE PRACTITIONER

## 2025-05-29 PROCEDURE — 36415 COLL VENOUS BLD VENIPUNCTURE: CPT | Performed by: NURSE PRACTITIONER

## 2025-05-29 PROCEDURE — 83735 ASSAY OF MAGNESIUM: CPT | Performed by: NURSE PRACTITIONER

## 2025-05-29 PROCEDURE — 83690 ASSAY OF LIPASE: CPT | Performed by: NURSE PRACTITIONER

## 2025-05-29 PROCEDURE — 80053 COMPREHEN METABOLIC PANEL: CPT | Performed by: NURSE PRACTITIONER

## 2025-05-29 RX ORDER — OMEPRAZOLE 40 MG/1
40 CAPSULE, DELAYED RELEASE ORAL DAILY
Qty: 90 CAPSULE | Refills: 3 | Status: SHIPPED | OUTPATIENT
Start: 2025-05-29 | End: 2026-05-24

## 2025-05-29 RX ORDER — ONDANSETRON 4 MG/1
4 TABLET, FILM COATED ORAL EVERY 8 HOURS PRN
Qty: 20 TABLET | Refills: 0 | Status: SHIPPED | OUTPATIENT
Start: 2025-05-29

## 2025-05-29 NOTE — PROGRESS NOTES
HPI    Patient presents for hospital follow-up.  Was admitted on 5/21 for acute pancreatitis.  Ports that she still is having significant epigastric pain as well as nausea.    Review of Systems   Gastrointestinal:  Positive for abdominal pain and nausea. Negative for constipation and diarrhea.   All other systems reviewed and are negative.       Vitals:    05/29/25 1547   BP: 111/74   Pulse: 85   Weight: 189 lb 3.2 oz (85.8 kg)   Height: 5' 5\" (1.651 m)     Body mass index is 31.48 kg/m².    Health Maintenance   Topic Date Due    Colorectal Cancer Screening  Never done    Pneumococcal Vaccine: 50+ Years (1 of 2 - PCV) Never done    Zoster Vaccines (1 of 2) Never done    DTaP,Tdap,and Td Vaccines (2 - Td or Tdap) 09/30/2021    Diabetes Care: Microalb/Creat Ratio (Annual)  Never done    Influenza Vaccine (Season Ended) 10/01/2025    Annual Physical  11/19/2025    Diabetes Care A1C  11/21/2025    Diabetes Care Foot Exam  01/13/2026    Mammogram  02/19/2026    Diabetes Care Dilated Eye Exam  04/03/2026    Diabetes Care: GFR  05/26/2026    Annual Depression Screening  Completed    Meningococcal B Vaccine  Aged Out       Past Medical History[1]    .Past Surgical History[2]    Family History[3]    Social History     Socioeconomic History    Marital status:      Spouse name: Not on file    Number of children: Not on file    Years of education: Not on file    Highest education level: Not on file   Occupational History    Not on file   Tobacco Use    Smoking status: Former     Types: Cigarettes    Smokeless tobacco: Former     Quit date: 12/29/2013   Vaping Use    Vaping status: Never Used   Substance and Sexual Activity    Alcohol use: Not Currently     Alcohol/week: 0.0 - 1.0 standard drinks of alcohol     Comment: SOCIAL    Drug use: No    Sexual activity: Not on file   Other Topics Concern    Not on file   Social History Narrative    Not on file     Social Drivers of Health     Food Insecurity: No Food Insecurity  (5/21/2025)    NCSS - Food Insecurity     Worried About Running Out of Food in the Last Year: No     Ran Out of Food in the Last Year: No   Transportation Needs: No Transportation Needs (5/21/2025)    NCSS - Transportation     Lack of Transportation: No   Stress: Not on file   Housing Stability: Not At Risk (5/21/2025)    NCSS - Housing/Utilities     Has Housing: Yes     Worried About Losing Housing: No     Unable to Get Utilities: No       Current Medications[4]    Allergies:  Allergies[5]    Physical Exam  Vitals and nursing note reviewed.   Constitutional:       Appearance: Normal appearance.   Cardiovascular:      Rate and Rhythm: Normal rate and regular rhythm.      Heart sounds: Normal heart sounds.   Pulmonary:      Effort: Pulmonary effort is normal. No respiratory distress.      Breath sounds: Normal breath sounds. No stridor. No wheezing, rhonchi or rales.   Chest:      Chest wall: No tenderness.   Abdominal:      General: Abdomen is flat. Bowel sounds are normal.      Palpations: Abdomen is soft.      Tenderness: There is abdominal tenderness in the epigastric area.   Neurological:      Mental Status: She is alert and oriented to person, place, and time.          Assessment and Plan:   Problem List Items Addressed This Visit          Gastrointestinal and Abdominal    Severe acute pancreatitis (HCC)    Relevant Medications    Omeprazole 40 MG Oral Capsule Delayed Release    ondansetron (ZOFRAN) 4 mg tablet    Other Relevant Orders    Gastro Referral - In Network    Magnesium [E]    Comp Metabolic Panel (14) [E]    Lipase [E]     Other Visit Diagnoses         Hospital discharge follow-up    -  Primary    Relevant Medications    Omeprazole 40 MG Oral Capsule Delayed Release    ondansetron (ZOFRAN) 4 mg tablet    Other Relevant Orders    Gastro Referral - In Network    Magnesium [E]    Comp Metabolic Panel (14) [E]    Lipase [E]      Epigastric pain        Relevant Medications    Omeprazole 40 MG Oral Capsule  Delayed Release    ondansetron (ZOFRAN) 4 mg tablet    Other Relevant Orders    Gastro Referral - In Network    Magnesium [E]    Comp Metabolic Panel (14) [E]    Lipase [E]      Type 2 diabetes mellitus without complication, without long-term current use of insulin (HCC)        Relevant Medications    Omeprazole 40 MG Oral Capsule Delayed Release    ondansetron (ZOFRAN) 4 mg tablet    Other Relevant Orders    Gastro Referral - In Network    Magnesium [E]    Comp Metabolic Panel (14) [E]    Lipase [E]      Leg cramps        Relevant Medications    Omeprazole 40 MG Oral Capsule Delayed Release    ondansetron (ZOFRAN) 4 mg tablet    Other Relevant Orders    Magnesium [E]    Comp Metabolic Panel (14) [E]    Lipase [E]           Trial of omeprazole daily and Zofran as needed.  Repeat labs today.  Referral to gastroenterology for assessment and treatment.  Supportive care dispensed.    Discussed plan of care with patient and patient is in agreement.  All questions answered. Patient to call with questions or concerns.    Encouraged to sign up for My Chart if not already registered.        [1]   Past Medical History:   Depression    Diabetes (HCC)    Esophageal reflux    High blood pressure    High cholesterol    Migraines    PONV (postoperative nausea and vomiting)    Sleep apnea    NO MACHINE/TREATMENT    Stroke (HCC)    TIA    Visual impairment    CONTACTS/GLASSES   [2]   Past Surgical History:  Procedure Laterality Date    Appendectomy  02/22/2024    Cholecystectomy  01/01/1991    Hysterectomy  01/01/2013    partial hysterectomy, has ovaries    Removal of ovary(s) Right 02/22/2024    RT ovary    Tubal ligation  18 years ago   [3]   Family History  Problem Relation Age of Onset    Hypertension Mother     Diabetes Father     Prostate Cancer Father 65    Cancer Paternal Grandmother     Breast Cancer Neg     Ovarian Cancer Neg     Pancreatic Cancer Neg    [4]   Current Outpatient Medications   Medication Sig Dispense Refill     Omeprazole 40 MG Oral Capsule Delayed Release Take 1 capsule (40 mg total) by mouth daily. 90 capsule 3    ondansetron (ZOFRAN) 4 mg tablet Take 1 tablet (4 mg total) by mouth every 8 (eight) hours as needed for Nausea. 20 tablet 0    metFORMIN 500 MG Oral Tab Take 1 tablet (500 mg total) by mouth daily with breakfast. 90 tablet 1    Omega-3 Fatty Acids (OMEGA-3 FISH OIL OR) Take 1 capsule by mouth daily.      VITAMIN E OR Take 1 tablet by mouth daily.      B COMPLEX-C ER OR Take 1 tablet by mouth daily.      empagliflozin 10 MG Oral Tab Take 1 tablet (10 mg total) by mouth daily. 90 tablet 3    aspirin 81 MG Oral Tab EC Take 1 tablet (81 mg total) by mouth in the morning.      lisinopril 5 MG Oral Tab Take 1 tablet (5 mg total) by mouth daily. (Patient not taking: Reported on 5/29/2025) 90 tablet 3   [5]   Allergies  Allergen Reactions    Lexapro [Escitalopram] SWELLING and Tightness in Throat    Lisinopril NAUSEA AND VOMITING

## 2025-05-30 ENCOUNTER — APPOINTMENT (OUTPATIENT)
Dept: ULTRASOUND IMAGING | Facility: HOSPITAL | Age: 55
End: 2025-05-30
Attending: EMERGENCY MEDICINE
Payer: MEDICAID

## 2025-05-30 ENCOUNTER — HOSPITAL ENCOUNTER (EMERGENCY)
Facility: HOSPITAL | Age: 55
Discharge: HOME | End: 2025-05-30
Attending: EMERGENCY MEDICINE
Payer: MEDICAID

## 2025-05-30 ENCOUNTER — TELEPHONE (OUTPATIENT)
Dept: FAMILY MEDICINE CLINIC | Facility: CLINIC | Age: 55
End: 2025-05-30

## 2025-05-30 VITALS
HEART RATE: 71 BPM | DIASTOLIC BLOOD PRESSURE: 87 MMHG | OXYGEN SATURATION: 93 % | SYSTOLIC BLOOD PRESSURE: 110 MMHG | RESPIRATION RATE: 20 BRPM | TEMPERATURE: 98 F

## 2025-05-30 DIAGNOSIS — R25.2 MUSCLE CRAMPS: Primary | ICD-10-CM

## 2025-05-30 DIAGNOSIS — R25.2 LEG CRAMPING: Primary | ICD-10-CM

## 2025-05-30 PROCEDURE — 96374 THER/PROPH/DIAG INJ IV PUSH: CPT

## 2025-05-30 PROCEDURE — 99284 EMERGENCY DEPT VISIT MOD MDM: CPT

## 2025-05-30 PROCEDURE — 93970 EXTREMITY STUDY: CPT | Performed by: EMERGENCY MEDICINE

## 2025-05-30 RX ORDER — CYCLOBENZAPRINE HCL 5 MG
5 TABLET ORAL 3 TIMES DAILY PRN
Qty: 6 TABLET | Refills: 0 | Status: SHIPPED | OUTPATIENT
Start: 2025-05-30

## 2025-05-30 RX ORDER — HYDROCODONE BITARTRATE AND ACETAMINOPHEN 5; 325 MG/1; MG/1
1 TABLET ORAL ONCE
Refills: 0 | Status: COMPLETED | OUTPATIENT
Start: 2025-05-30 | End: 2025-05-30

## 2025-05-30 RX ORDER — HYDROCODONE BITARTRATE AND ACETAMINOPHEN 5; 325 MG/1; MG/1
1 TABLET ORAL EVERY 6 HOURS PRN
Qty: 6 TABLET | Refills: 0 | Status: SHIPPED | OUTPATIENT
Start: 2025-05-30 | End: 2025-06-06

## 2025-05-30 RX ORDER — DIAZEPAM 5 MG/1
5 TABLET ORAL ONCE
Status: COMPLETED | OUTPATIENT
Start: 2025-05-30 | End: 2025-05-30

## 2025-05-30 RX ORDER — MORPHINE SULFATE 2 MG/ML
2 INJECTION, SOLUTION INTRAMUSCULAR; INTRAVENOUS ONCE
Status: COMPLETED | OUTPATIENT
Start: 2025-05-30 | End: 2025-05-30

## 2025-05-30 NOTE — ED INITIAL ASSESSMENT (HPI)
54y F to ED via personal car with c/o leg cramping. Patient was discharged from hospital on Monday after hospitalization for pancreatitis. Patient has now been having tolerable body and leg cramping since Tuesday. Patient reports seeing her PCP today and had bloodwork done d/t new complaint of cramping. Magnesium WNL.Tonight, patient has had excruciating bilateral leg cramping x 3 hours. Cramping comes up into hips. Biofreeze ineffective. Gabapentin + ibuprofen is providing temporary relief. Denies any cramping of hand/fingers.

## 2025-05-30 NOTE — DISCHARGE INSTRUCTIONS
Take Tylenol as needed for pain.  If your pain is not relieved with Tylenol  you can take Norco as needed for severe pain.  Each tablet of Norco has 325 milligrams acetaminophen (Tylenol).  Do not take more than 3900 mg acetaminophen (Tylenol) in 1 day.  Do not drink alcohol or drive while taking Norco.  Take an over-the-counter stool softener such as Colace while taking Norco.  Risk of addiction to pain medication increases after 3 days, make sure to use this for shortest duration as possible and only for severe pain.     You can also take ibuprofen as needed for pain.    See primary care for follow-up

## 2025-05-30 NOTE — TELEPHONE ENCOUNTER
1818-received page on-call.  Patient of PHILLIP Dowell, [facility] leg cramps in both legs.  1847-chart reviewed.  Patient recently hospitalized for pancreatitis.  Discharged home on 5/24/2025.  Followed up in office 5/29/2025 PHILLIP Dowell with reported leg cramping.  Laboratory studies were ordered which showed worsening lipase, hyponatremia, and elevated liver function test.  Patient denies alcohol use.  Patient reports she was in ER last night where she had ultrasound of lower extremities completed which were negative for DVT.  Patient was discharged home on [place] as needed for pain and advised to follow-up in office with PHILLIP Dowell.  Patient called office today to let PHILLIP Dowell know that she was recently in the ER.  Paged on-call provider to let him know pain continues.  Patient reports she is drinking water.  No new activities.  Not dehydrated.  Advised to take liquids with liquid IV, and/or Gatorade with electrolytes.  Provided with short course of muscle relaxants for weekend per patient request.  Patient will follow-up in office with PHILLIP Dowell.  Will call to schedule appointment in the morning.  Advised if symptoms worsen or unable to manage pain at home.  To proceed to emergency department for reevaluation.    PHILLIP Schroeder

## 2025-05-30 NOTE — ED PROVIDER NOTES
Patient Seen in: [facility] Emergency Department       The following individual(s) verbally consented to be recorded using ambient AI listening technology and understand that they can each withdraw their consent to this listening technology at any point by asking the clinician to turn off or pause the recording:    Patient name: Lissette Block        History  No chief complaint on file.    Stated Complaint: leg pain    Subjective:   HPI     Lissette Block is a 54 year old female who presents with severe bilateral leg cramps. She is accompanied by her .    She has been experiencing severe cramps in both legs since being discharged from the hospital on Monday. The cramps are described as 'really, really bad cramping' and originate in the middle thighs, radiating down to the feet, affecting the sides and toes. Previously, stretching would alleviate the cramps, but currently, stretching does not provide relief.    The cramps began on Tuesday, initially affecting her upper body before progressing to her legs at night. She was admitted to the hospital last Wednesday and discharged on Monday.    She has been taking ibuprofen for pain management and takes aspirin daily. She reports an allergy to lisinopril and possibly Lexapro.    No swelling, injury, weakness, numbness, chest pain, or shortness of breath. She experiences lung pain but no other respiratory symptoms.        Objective:     Past Medical History:    Depression    Diabetes (HCC)    Esophageal reflux    High blood pressure    High cholesterol    Migraines    PONV (postoperative nausea and vomiting)    Sleep apnea    NO MACHINE/TREATMENT    Stroke (HCC)    TIA    Visual impairment    CONTACTS/GLASSES              Past Surgical History:   Procedure Laterality Date    Appendectomy  02/22/2024    Cholecystectomy  01/01/1991    Hysterectomy  01/01/2013    partial hysterectomy, has ovaries    Removal of ovary(s) Right 02/22/2024    RT  ovary    Tubal ligation  18 years ago                Social History     Socioeconomic History    Marital status:    Tobacco Use    Smoking status: Former     Types: Cigarettes    Smokeless tobacco: Former     Quit date: 12/29/2013   Vaping Use    Vaping status: Never Used   Substance and Sexual Activity    Alcohol use: Not Currently     Alcohol/week: 0.0 - 1.0 standard drinks of alcohol     Comment: SOCIAL    Drug use: No     Social Drivers of Health     Food Insecurity: No Food Insecurity (5/21/2025)    NCSS - Food Insecurity     Worried About Running Out of Food in the Last Year: No     Ran Out of Food in the Last Year: No   Transportation Needs: No Transportation Needs (5/21/2025)    NCSS - Transportation     Lack of Transportation: No   Housing Stability: Not At Risk (5/21/2025)    NCSS - Housing/Utilities     Has Housing: Yes     Worried About Losing Housing: No     Unable to Get Utilities: No                                Physical Exam    ED Triage Vitals [05/30/25 0146]   /81   Pulse 93   Resp 18   Temp 96.8 °F (36 °C)   Temp src Temporal   SpO2 97 %   O2 Device None (Room air)       Current Vitals:   Vital Signs  BP: 115/60  Pulse: 87  Resp: 20  Temp: 98 °F (36.7 °C)  Temp src: Oral    Oxygen Therapy  SpO2: 96 %  O2 Device: None (Room air)            Physical Exam  Constitutional:       Appearance: She is not ill-appearing, toxic-appearing or diaphoretic.   Cardiovascular:      Rate and Rhythm: Normal rate and regular rhythm.      Pulses:           Dorsalis pedis pulses are 2+ on the right side and 2+ on the left side.   Pulmonary:      Effort: Pulmonary effort is normal. No respiratory distress.   Musculoskeletal:         General: Normal range of motion.      Cervical back: Normal range of motion.   Neurological:      Mental Status: She is alert.      Sensory: No sensory deficit.      Motor: No weakness.      Comments: 5/5 and sensation intact bilateral lower extremities                 ED  Course  Labs Reviewed - No data to display       BILATERAL LOWER EXTREMITY DUPLEX ULTRASOUND    IMPRESSION:  No evidence of DVT in the visualized bilateral lower extremity veins.                    MDM             Medical Decision Making  Differential diagnosis includes but is not limited to DVT, electrolyte imbalance, myalgia, peripheral neuropathy, medication induced cramps (not on statins or diuretics), dehydration, peripheral arterial disease, rhabdomyolysis    Well-appearing patient, noted to have muscle cramping during initial history, standing at the bed, rubbing her leg.  Minimal improvement after Valium, patient feeling much better after morphine.  After ultrasound patient able to sleep, laying in bed comfortably.  Discussed differential and results with patient and  at the bedside.  Advised outpatient follow-up.  Discussed supportive care such as warm soaks, Epsom salt, warm compress.    Problems Addressed:  Leg cramping: acute illness or injury     Details: Differential diagnoses encompassed high-risk conditions with potential threats to life, limb, or major bodily functions, warranting comprehensive evaluation and high-complexity medical decision-making.      Amount and/or Complexity of Data Reviewed  Radiology: ordered.    Risk  Prescription drug management.  Parenteral controlled substances.        Disposition and Plan     Clinical Impression:  1. Leg cramping         Disposition:  Discharge  5/30/2025  5:19 am    Follow-up:  Vee Dowell APRN  [address] [id number] [phone]    Call today  For follow up          Medications Prescribed:  Current Discharge Medication List        START taking these medications    Details   HYDROcodone-acetaminophen 5-325 MG Oral Tab Take 1 tablet by mouth every 6 (six) hours as needed for Pain (Do not exceed 8 tabs per day.).  Qty: 6 tablet, Refills: 0    Associated Diagnoses: Leg cramping                   Supplementary Documentation:

## 2025-05-30 NOTE — TELEPHONE ENCOUNTER
Patient saw Vee Dowell in the office yesterday. Leg cramps were so bad last night that she went to the emergency room. Wanted to Vee Dowell to be aware and to review the ER notes.     5/30/2025 emergency room notes:  Disposition and Plan      Clinical Impression:  1. Leg cramping    Disposition:  Discharge  5/30/2025  5:19 am     Follow-up:  Vee Dowell, PHILLIP  [address] [place] [id number]  [phone]     Call today  For follow up   Medications Prescribed:  Current Discharge Medication List               START taking these medications     Details   HYDROcodone-acetaminophen 5-325 MG Oral Tab Take 1 tablet by mouth every 6 (six) hours as needed for Pain (Do not exceed 8 tabs per day.).  Qty: 6 tablet, Refills: 0     Associated Diagnoses: Leg cramping

## 2025-05-31 ENCOUNTER — TELEMEDICINE (OUTPATIENT)
Dept: FAMILY MEDICINE CLINIC | Facility: CLINIC | Age: 55
End: 2025-05-31
Payer: MEDICAID

## 2025-05-31 DIAGNOSIS — R25.2 LEG CRAMPS: Primary | ICD-10-CM

## 2025-05-31 PROCEDURE — 99212 OFFICE O/P EST SF 10 MIN: CPT | Performed by: NURSE PRACTITIONER

## 2025-05-31 RX ORDER — ROPINIROLE 0.25 MG/1
TABLET, FILM COATED ORAL
Qty: 2 TABLET | Refills: 0 | Status: SHIPPED | OUTPATIENT
Start: 2025-05-31

## 2025-05-31 RX ORDER — ROPINIROLE 0.5 MG/1
TABLET, FILM COATED ORAL
Qty: 5 TABLET | Refills: 0 | Status: SHIPPED | OUTPATIENT
Start: 2025-06-02

## 2025-05-31 NOTE — PROGRESS NOTES
HPI    Virtual Telephone/Video Check-In    Lissette Block verbally consents to a Virtual/Telephone Check-In visit on 05/31/25.  Patient has been referred to the [facility] website at [email or web address] to review the yearly Consent to Treat document.    Patient understands and accepts financial responsibility for any deductible, co-insurance and/or co-pays associated with this service.    Duration of the service: 10 minutes      Summary of topics discussed:     Patient presents for ER follow-up.  Has been having significant leg cramping.  Took magnesium without relief of symptoms.  Was given Flexeril 5 mg to take as needed yesterday by Taiwo English NP while on call.  She reports that this helped with her symptoms quite a bit and she was able to sleep but still having pain today.    Review of Systems   Musculoskeletal:         Bilateral leg cramps.   All other systems reviewed and are negative.       There were no vitals filed for this visit.  There is no height or weight on file to calculate BMI.    Health Maintenance   Topic Date Due    Colorectal Cancer Screening  Never done    Pneumococcal Vaccine: 50+ Years (1 of 2 - PCV) Never done    Zoster Vaccines (1 of 2) Never done    DTaP,Tdap,and Td Vaccines (2 - Td or Tdap) 09/30/2021    Diabetes Care: Microalb/Creat Ratio (Annual)  Never done    Influenza Vaccine (Season Ended) 10/01/2025    Annual Physical  11/19/2025    Diabetes Care A1C  11/21/2025    Diabetes Care Foot Exam  01/13/2026    Mammogram  02/19/2026    Diabetes Care Dilated Eye Exam  04/03/2026    Diabetes Care: GFR  05/29/2026    Annual Depression Screening  Completed    Meningococcal B Vaccine  Aged Out       Past Medical History[1]    .Past Surgical History[2]    Family History[3]    Social History     Socioeconomic History    Marital status:      Spouse name: Not on file    Number of children: Not on file    Years of education: Not on file    Highest education level: Not on file    Occupational History    Not on file   Tobacco Use    Smoking status: Former     Types: Cigarettes    Smokeless tobacco: Former     Quit date: 12/29/2013   Vaping Use    Vaping status: Never Used   Substance and Sexual Activity    Alcohol use: Not Currently     Alcohol/week: 0.0 - 1.0 standard drinks of alcohol     Comment: SOCIAL    Drug use: No    Sexual activity: Not on file   Other Topics Concern    Not on file   Social History Narrative    Not on file     Social Drivers of Health     Food Insecurity: No Food Insecurity (5/21/2025)    NCSS - Food Insecurity     Worried About Running Out of Food in the Last Year: No     Ran Out of Food in the Last Year: No   Transportation Needs: No Transportation Needs (5/21/2025)    NCSS - Transportation     Lack of Transportation: No   Stress: Not on file   Housing Stability: Not At Risk (5/21/2025)    NCSS - Housing/Utilities     Has Housing: Yes     Worried About Losing Housing: No     Unable to Get Utilities: No       Current Medications[4]    Allergies:  Allergies[5]    Physical Exam  Constitutional:       Appearance: Normal appearance.   Pulmonary:      Effort: No respiratory distress.   Neurological:      Mental Status: She is alert and oriented to person, place, and time.          Assessment and Plan:   Problem List Items Addressed This Visit    None  Visit Diagnoses         Leg cramps    -  Primary    Relevant Medications    rOPINIRole 0.5 MG Oral Tab (Start on 6/2/2025)    rOPINIRole 0.25 MG Oral Tab           Will trial ropinirole 0.25 mg once nightly for 2 nights and titrate up to 0.5 mg for the subsequent 5 nights.  Follow-up scheduled with susanna mclean on Wednesday, 6/4/2025 to reassess symptoms and determine if we should continue to titrate up dosage.  Supportive care discussed.    Discussed plan of care with patient and patient is in agreement.  All questions answered. Patient to call with questions or concerns.    Encouraged to sign up for My Chart if not  already registered.        [1]   Past Medical History:   Depression    Diabetes (HCC)    Esophageal reflux    High blood pressure    High cholesterol    Migraines    PONV (postoperative nausea and vomiting)    Sleep apnea    NO MACHINE/TREATMENT    Stroke (HCC)    TIA    Visual impairment    CONTACTS/GLASSES   [2]   Past Surgical History:  Procedure Laterality Date    Appendectomy  02/22/2024    Cholecystectomy  01/01/1991    Hysterectomy  01/01/2013    partial hysterectomy, has ovaries    Removal of ovary(s) Right 02/22/2024    RT ovary    Tubal ligation  18 years ago   [3]   Family History  Problem Relation Age of Onset    Hypertension Mother     Diabetes Father     Prostate Cancer Father 65    Cancer Paternal Grandmother     Breast Cancer Neg     Ovarian Cancer Neg     Pancreatic Cancer Neg    [4]   Current Outpatient Medications   Medication Sig Dispense Refill    [START ON 6/2/2025] rOPINIRole 0.5 MG Oral Tab Take one tablet once nightly by mouth for 5 nights. 5 tablet 0    rOPINIRole 0.25 MG Oral Tab Take 1 tablet once nightly by mouth for 2 nights then increase to 0.5 mg. 2 tablet 0    HYDROcodone-acetaminophen 5-325 MG Oral Tab Take 1 tablet by mouth every 6 (six) hours as needed for Pain (Do not exceed 8 tabs per day.). 6 tablet 0    cyclobenzaprine 5 MG Oral Tab Take 1 tablet (5 mg total) by mouth 3 (three) times daily as needed for Muscle spasms. 6 tablet 0    Omeprazole 40 MG Oral Capsule Delayed Release Take 1 capsule (40 mg total) by mouth daily. 90 capsule 3    ondansetron (ZOFRAN) 4 mg tablet Take 1 tablet (4 mg total) by mouth every 8 (eight) hours as needed for Nausea. 20 tablet 0    metFORMIN 500 MG Oral Tab Take 1 tablet (500 mg total) by mouth daily with breakfast. 90 tablet 1    Omega-3 Fatty Acids (OMEGA-3 FISH OIL OR) Take 1 capsule by mouth daily.      VITAMIN E OR Take 1 tablet by mouth daily.      B COMPLEX-C ER OR Take 1 tablet by mouth daily.      lisinopril 5 MG Oral Tab Take 1 tablet  (5 mg total) by mouth daily. (Patient not taking: Reported on 5/29/2025) 90 tablet 3    empagliflozin 10 MG Oral Tab Take 1 tablet (10 mg total) by mouth daily. 90 tablet 3    aspirin 81 MG Oral Tab EC Take 1 tablet (81 mg total) by mouth in the morning.     [5]   Allergies  Allergen Reactions    Lexapro [Escitalopram] SWELLING and Tightness in Throat    Lisinopril NAUSEA AND VOMITING

## 2025-06-04 ENCOUNTER — TELEMEDICINE (OUTPATIENT)
Dept: FAMILY MEDICINE CLINIC | Facility: CLINIC | Age: 55
End: 2025-06-04
Payer: MEDICAID

## 2025-06-04 DIAGNOSIS — R25.2 LEG CRAMPS: Primary | ICD-10-CM

## 2025-06-04 PROCEDURE — 99212 OFFICE O/P EST SF 10 MIN: CPT | Performed by: NURSE PRACTITIONER

## 2025-06-04 RX ORDER — ROPINIROLE 1 MG/1
1 TABLET, FILM COATED ORAL NIGHTLY
Qty: 7 TABLET | Refills: 0 | Status: SHIPPED | OUTPATIENT
Start: 2025-06-04 | End: 2025-06-11

## 2025-06-04 NOTE — PROGRESS NOTES
[facility]    Virtual Telephone/Video Check-In    Lissette Block verbally consents to a Virtual/Telephone Check-In visit on 06/04/25.  Patient has been referred to the [facility] website at [email or web address] to review the yearly Consent to Treat document.    Patient understands and accepts financial responsibility for any deductible, co-insurance and/or co-pays associated with this service.    Duration of the service: 10 minutes      Summary of topics discussed:     Patient presents for follow up for leg cramps.  Started ropinirole last week.  Started at 0.25 mg for 2 nights and then titrated up to 0.5 mg for 5 nights.  Reports that symptoms have improved significantly but still having intermittently cramps.  Is also taking magnesium 250 mg nightly as well as using topical magnesium foam to rub onto legs that she purchased over-the-counter.  Would like to continue taking ropinirole for symptoms since she is having relief of symptoms.    Review of Systems   Musculoskeletal:         Bilateral leg cramps.   All other systems reviewed and are negative.       There were no vitals filed for this visit.  There is no height or weight on file to calculate BMI.    Health Maintenance   Topic Date Due    Colorectal Cancer Screening  Never done    Pneumococcal Vaccine: 50+ Years (1 of 2 - PCV) Never done    Zoster Vaccines (1 of 2) Never done    DTaP,Tdap,and Td Vaccines (2 - Td or Tdap) 09/30/2021    Diabetes Care: Microalb/Creat Ratio (Annual)  Never done    Influenza Vaccine (Season Ended) 10/01/2025    Annual Physical  11/19/2025    Diabetes Care A1C  11/21/2025    Diabetes Care Foot Exam  01/13/2026    Mammogram  02/19/2026    Diabetes Care Dilated Eye Exam  04/03/2026    Diabetes Care: GFR  05/29/2026    Annual Depression Screening  Completed    Meningococcal B Vaccine  Aged Out       Past Medical History[1]    .Past Surgical History[2]    Family History[3]    Social History     Socioeconomic History    Marital status:       Spouse name: Not on file    Number of children: Not on file    Years of education: Not on file    Highest education level: Not on file   Occupational History    Not on file   Tobacco Use    Smoking status: Former     Types: Cigarettes    Smokeless tobacco: Former     Quit date: 12/29/2013   Vaping Use    Vaping status: Never Used   Substance and Sexual Activity    Alcohol use: Not Currently     Alcohol/week: 0.0 - 1.0 standard drinks of alcohol     Comment: SOCIAL    Drug use: No    Sexual activity: Not on file   Other Topics Concern    Not on file   Social History Narrative    Not on file     Social Drivers of Health     Food Insecurity: No Food Insecurity (5/21/2025)    NCSS - Food Insecurity     Worried About Running Out of Food in the Last Year: No     Ran Out of Food in the Last Year: No   Transportation Needs: No Transportation Needs (5/21/2025)    NCSS - Transportation     Lack of Transportation: No   Stress: Not on file   Housing Stability: Not At Risk (5/21/2025)    NCSS - Housing/Utilities     Has Housing: Yes     Worried About Losing Housing: No     Unable to Get Utilities: No       Current Medications[4]    Allergies:  Allergies[5]    Physical Exam  Constitutional:       Appearance: Normal appearance.   Pulmonary:      Effort: No respiratory distress.   Neurological:      Mental Status: She is alert and oriented to person, place, and time.          Assessment and Plan:   Problem List Items Addressed This Visit    None  Visit Diagnoses         Leg cramps    -  Primary    Relevant Medications    rOPINIRole 1 MG Oral Tab           Titrate up to 1 mg.  Follow-up in 1 week for reassessment.    Discussed plan of care with patient and patient is in agreement.  All questions answered. Patient to call with questions or concerns.    Encouraged to sign up for My Chart if not already registered.        [1]   Past Medical History:   Depression    Diabetes (HCC)    Esophageal reflux    High blood pressure     High cholesterol    Migraines    PONV (postoperative nausea and vomiting)    Sleep apnea    NO MACHINE/TREATMENT    Stroke (HCC)    TIA    Visual impairment    CONTACTS/GLASSES   [2]   Past Surgical History:  Procedure Laterality Date    Appendectomy  02/22/2024    Cholecystectomy  01/01/1991    Hysterectomy  01/01/2013    partial hysterectomy, has ovaries    Removal of ovary(s) Right 02/22/2024    RT ovary    Tubal ligation  18 years ago   [3]   Family History  Problem Relation Age of Onset    Hypertension Mother     Diabetes Father     Prostate Cancer Father 65    Cancer Paternal Grandmother     Breast Cancer Neg     Ovarian Cancer Neg     Pancreatic Cancer Neg    [4]   Current Outpatient Medications   Medication Sig Dispense Refill    rOPINIRole 1 MG Oral Tab Take 1 tablet (1 mg total) by mouth nightly for 7 days. 7 tablet 0    HYDROcodone-acetaminophen 5-325 MG Oral Tab Take 1 tablet by mouth every 6 (six) hours as needed for Pain (Do not exceed 8 tabs per day.). 6 tablet 0    cyclobenzaprine 5 MG Oral Tab Take 1 tablet (5 mg total) by mouth 3 (three) times daily as needed for Muscle spasms. 6 tablet 0    Omeprazole 40 MG Oral Capsule Delayed Release Take 1 capsule (40 mg total) by mouth daily. 90 capsule 3    ondansetron (ZOFRAN) 4 mg tablet Take 1 tablet (4 mg total) by mouth every 8 (eight) hours as needed for Nausea. 20 tablet 0    metFORMIN 500 MG Oral Tab Take 1 tablet (500 mg total) by mouth daily with breakfast. 90 tablet 1    Omega-3 Fatty Acids (OMEGA-3 FISH OIL OR) Take 1 capsule by mouth daily.      VITAMIN E OR Take 1 tablet by mouth daily.      B COMPLEX-C ER OR Take 1 tablet by mouth daily.      empagliflozin 10 MG Oral Tab Take 1 tablet (10 mg total) by mouth daily. 90 tablet 3    aspirin 81 MG Oral Tab EC Take 1 tablet (81 mg total) by mouth in the morning.     [5]   Allergies  Allergen Reactions    Lexapro [Escitalopram] SWELLING and Tightness in Throat    Lisinopril NAUSEA AND VOMITING

## 2025-06-06 DIAGNOSIS — R25.2 LEG CRAMPS: ICD-10-CM

## 2025-06-06 RX ORDER — ROPINIROLE 0.25 MG/1
TABLET, FILM COATED ORAL
Qty: 2 TABLET | Refills: 0 | OUTPATIENT
Start: 2025-06-06

## 2025-06-19 ENCOUNTER — TELEPHONE (OUTPATIENT)
Dept: FAMILY MEDICINE CLINIC | Facility: CLINIC | Age: 55
End: 2025-06-19

## (undated) DEVICE — GLOVE SUR 7.5 SENSICARE PI PIP CRM PWD F

## (undated) DEVICE — DISPOSABLE TOURNIQUET CUFF SINGLE BLADDER, DUAL PORT AND QUICK CONNECT CONNECTOR: Brand: COLOR CUFF

## (undated) DEVICE — GLOVE SUR 6.5 SENSICARE PI PIP CRM PWD F

## (undated) DEVICE — GOWN,SIRUS,FABRIC-REINFORCED,X-LARGE: Brand: MEDLINE

## (undated) DEVICE — KIT,ANTI FOG,W/SPONGE & FLUID,SOFT PACK: Brand: MEDLINE

## (undated) DEVICE — 3M™ STERI-STRIP™ REINFORCED ADHESIVE SKIN CLOSURES, R1547, 1/2 IN X 4 IN (12 MM X 100 MM), 6 STRIPS/ENVELOPE: Brand: 3M™ STERI-STRIP™

## (undated) DEVICE — SPONGE 4X4 10PK

## (undated) DEVICE — GLOVE SUR 7 SENSICARE PI PIP CRM PWD F

## (undated) DEVICE — MINI-BLADE®: Brand: BEAVER®

## (undated) DEVICE — ADHESIVE SKIN TOP FOR WND CLSR DERMBND ADV

## (undated) DEVICE — 3M™ TEGADERM™ +PAD FILM DRESSING WITH NON-ADHERENT PAD, 3587, 3-1/2 IN X 4-1/8 IN (9 CM X 10.5 CM), 25/CAR, 4 CAR/CS: Brand: 3M™ TEGADERM™

## (undated) DEVICE — LOW PROFILE (LP) BLADE ASSEMBLY 6PK: Brand: MICROAIRE®

## (undated) DEVICE — SOLUTION IRRIG 1000ML 0.9% NACL USP BTL

## (undated) DEVICE — DISPOSABLE BIPOLAR FORCEPS 4" (10.2CM) JEWELERS, STRAIGHT 0.4MM TIP AND 12 FT. (3.6M) CABLE: Brand: KIRWAN

## (undated) DEVICE — SUT MCRYL 5-0 18IN P-3 ABSRB UD 13MM 3/8 CIR

## (undated) DEVICE — ANTISEPTIC 4OZ 70% ISO ALC

## (undated) DEVICE — UPPER EXTREMITY CDS-LF: Brand: MEDLINE INDUSTRIES, INC.

## (undated) DEVICE — GLOVE SUR 7.5 SENSICARE PI PIP GRN PWD F

## (undated) NOTE — LETTER
Date: 3/10/2025    Patient Name: Lissette Block          To Whom it may concern:    The above patient was seen at [facility] for treatment of a medical condition.    This patient is cleared to return to work today with the following restrictions; no lifting,pushing, pulling, or carrying over 5 lbs.        Sincerely,      Jose Campo MD   Hand, Wrist, & Elbow Surgery  [email or web address]  t: [phone]  f: [phone]

## (undated) NOTE — ED AVS SNAPSHOT
Basilia Garcia   MRN: [id number]    Department:  [facility] Emergency Department   Date of Visit:  1/26/2018           Disclosure     Insurance plans vary and the physician(s) referred by the ER may not be covered by your plan.  Please co CARE PHYSICIAN AT ONCE OR RETURN IMMEDIATELY TO THE EMERGENCY DEPARTMENT. If you have been prescribed any medication(s), please fill your prescription right away and begin taking the medication(s) as directed.   If you believe that any of the medications

## (undated) NOTE — LETTER
AUTHORIZATION FOR SURGICAL OPERATION OR OTHER PROCEDURE    1. I hereby authorize Dr. Valenzuela, and [facility] staff assigned to my case to perform the following operation and/or procedure at the [facility] site:    Right heel cortisone injection   _______________________________________________________________________________________________      _______________________________________________________________________________________________    2.  My physician has explained the nature and purpose of the operation or other procedure, possible alternative methods of treatment, the risks involved, and the possibility of complication to me.  I acknowledge that no guarantee has been made as to the result that may be obtained.  3.  I recognize that, during the course of this operation, or other procedure, unforseen conditions may necessitate additional or different procedure than those listed above.  I, therefore, further authorize and request that the above named physician, his/her physician assistants or designees perform such procedures as are, in his/her professional opinion, necessary and desirable.  4.  Any tissue or organs removed in the operation or other procedure may be disposed of by and at the discretion of the [facility].  5.  I understand that in the event of a medical emergency, I will be transported by local paramedics to [facility] or other hospital emergency department.  6.  I certify that I have read and fully understand the above consent to operation and/or other procedure.    7.  I acknowledge that my physician has explained sedation/analgesia administration to me including the risks and benefits.  I consent to the administration of sedation/analgesia as may be necessary or desirable in the judgement of my physician.    Witness signature: ___________________________________________________ Date:   ______/______/_____                    Time:  ________ A.M.  P.M.       Patient Name:  ______________________________________________________  (please print)      Patient signature:  ___________________________________________________             Relationship to Patient:           []  Parent    Responsible person                          []  Spouse  In case of minor or                    [] Other  _____________   Incompetent name:  __________________________________________________                               (please print)      _____________      Responsible person  In case of minor or  Incompetent signature:  _______________________________________________    Statement of Physician  My signature below affirms that prior to the time of the procedure, I have explained to the patient and/or his/her guardian, the risks and benefits involved in the proposed treatment and any reasonable alternative to the proposed treatment.  I have also explained the risks and benefits involved in the refusal of the proposed treatment and have answered the patient's questions.                        Date:  ______/______/_______  Provider                      Signature:  __________________________________________________________       Time:  ___________ A.M    P.M.

## (undated) NOTE — LETTER
Date & Time: 5/7/2022, 6:43 PM  Patient: Sixto Davis  Encounter Provider(s):    Brittany Boone MD       To Whom It May Concern:    Sixto Davis was seen and treated in our department on 5/7/2022. She should not return to work until 5/10/2022.     If you have any questions or concerns, please do not hesitate to call.        _____________________________  Physician/APC Signature

## (undated) NOTE — LETTER
Date: 2/12/2025    Patient Name: Lissette Block          To Whom it may concern:    The above patient was seen at [facility] for treatment of a medical condition.    This patient can return to work 2/24/25 full duty with no restrictions      Sincerely,    BENJAMIN Watt